# Patient Record
Sex: MALE | Race: WHITE | NOT HISPANIC OR LATINO | Employment: OTHER | URBAN - METROPOLITAN AREA
[De-identification: names, ages, dates, MRNs, and addresses within clinical notes are randomized per-mention and may not be internally consistent; named-entity substitution may affect disease eponyms.]

---

## 2017-05-06 ENCOUNTER — HOSPITAL ENCOUNTER (EMERGENCY)
Facility: HOSPITAL | Age: 47
Discharge: HOME | End: 2017-05-06
Attending: EMERGENCY MEDICINE
Payer: COMMERCIAL

## 2017-05-06 ENCOUNTER — APPOINTMENT (EMERGENCY)
Dept: CT IMAGING | Facility: HOSPITAL | Age: 47
End: 2017-05-06
Payer: COMMERCIAL

## 2017-05-06 VITALS
HEIGHT: 67 IN | OXYGEN SATURATION: 98 % | HEART RATE: 124 BPM | RESPIRATION RATE: 18 BRPM | TEMPERATURE: 98 F | BODY MASS INDEX: 27.68 KG/M2 | SYSTOLIC BLOOD PRESSURE: 133 MMHG | WEIGHT: 176.37 LBS | DIASTOLIC BLOOD PRESSURE: 77 MMHG

## 2017-05-06 DIAGNOSIS — S80.212A ABRASION OF KNEE, LEFT, INITIAL ENCOUNTER: ICD-10-CM

## 2017-05-06 DIAGNOSIS — F10.929 ALCOHOL INTOXICATION (HCC): Primary | ICD-10-CM

## 2017-05-06 DIAGNOSIS — S00.31XA NASAL ABRASION, INITIAL ENCOUNTER: ICD-10-CM
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
## 2017-05-06 LAB — ETHANOL EXG-MCNC: 0.31 MG/DL

## 2017-05-06 PROCEDURE — 90471 IMMUNIZATION ADMIN: CPT

## 2017-05-06 PROCEDURE — 70450 CT HEAD/BRAIN W/O DYE: CPT

## 2017-05-06 PROCEDURE — 70486 CT MAXILLOFACIAL W/O DYE: CPT

## 2017-05-06 PROCEDURE — 72125 CT NECK SPINE W/O DYE: CPT

## 2017-05-06 PROCEDURE — 99284 EMERGENCY DEPT VISIT MOD MDM: CPT

## 2017-05-06 PROCEDURE — 82075 ASSAY OF BREATH ETHANOL: CPT | Performed by: EMERGENCY MEDICINE

## 2017-05-06 PROCEDURE — 90715 TDAP VACCINE 7 YRS/> IM: CPT | Performed by: EMERGENCY MEDICINE

## 2017-05-06 RX ORDER — BACITRACIN, NEOMYCIN, POLYMYXIN B 400; 3.5; 5 [USP'U]/G; MG/G; [USP'U]/G
1 OINTMENT TOPICAL ONCE
Status: COMPLETED | OUTPATIENT
Start: 2017-05-06 | End: 2017-05-06

## 2017-05-06 RX ADMIN — TETANUS TOXOID, REDUCED DIPHTHERIA TOXOID AND ACELLULAR PERTUSSIS VACCINE, ADSORBED 0.5 ML: 5; 2.5; 8; 8; 2.5 SUSPENSION INTRAMUSCULAR at 08:57

## 2017-05-06 RX ADMIN — NEOMYCIN AND POLYMYXIN B SULFATES AND BACITRACIN ZINC 1 SMALL APPLICATION: 400; 3.5; 5 OINTMENT TOPICAL at 08:57

## 2017-07-02 ENCOUNTER — HOSPITAL ENCOUNTER (EMERGENCY)
Facility: HOSPITAL | Age: 47
Discharge: HOME | End: 2017-07-02
Attending: EMERGENCY MEDICINE
Payer: COMMERCIAL

## 2017-07-02 VITALS
TEMPERATURE: 99.4 F | WEIGHT: 170 LBS | DIASTOLIC BLOOD PRESSURE: 91 MMHG | HEART RATE: 110 BPM | OXYGEN SATURATION: 98 % | HEIGHT: 67 IN | BODY MASS INDEX: 26.68 KG/M2 | SYSTOLIC BLOOD PRESSURE: 162 MMHG | RESPIRATION RATE: 16 BRPM

## 2017-07-02 DIAGNOSIS — T21.24XA: Primary | ICD-10-CM

## 2017-07-02 DIAGNOSIS — L03.90 CELLULITIS: ICD-10-CM

## 2017-07-02 PROCEDURE — 99283 EMERGENCY DEPT VISIT LOW MDM: CPT

## 2017-07-02 PROCEDURE — 90715 TDAP VACCINE 7 YRS/> IM: CPT | Performed by: EMERGENCY MEDICINE

## 2017-07-02 PROCEDURE — 90471 IMMUNIZATION ADMIN: CPT

## 2017-07-02 RX ORDER — DIAPER,BRIEF,INFANT-TODD,DISP
1 EACH MISCELLANEOUS 3 TIMES DAILY
Qty: 30 G | Refills: 0 | Status: SHIPPED | OUTPATIENT
Start: 2017-07-02 | End: 2017-07-09

## 2017-07-02 RX ORDER — CEPHALEXIN 250 MG/1
500 CAPSULE ORAL ONCE
Status: COMPLETED | OUTPATIENT
Start: 2017-07-02 | End: 2017-07-02

## 2017-07-02 RX ORDER — CEPHALEXIN 500 MG/1
500 CAPSULE ORAL EVERY 6 HOURS SCHEDULED
Qty: 40 CAPSULE | Refills: 0 | Status: SHIPPED | OUTPATIENT
Start: 2017-07-02 | End: 2017-07-12

## 2017-07-02 RX ORDER — GINSENG 100 MG
1 CAPSULE ORAL ONCE
Status: COMPLETED | OUTPATIENT
Start: 2017-07-02 | End: 2017-07-02

## 2017-07-02 RX ADMIN — TETANUS TOXOID, REDUCED DIPHTHERIA TOXOID AND ACELLULAR PERTUSSIS VACCINE, ADSORBED 0.5 ML: 5; 2.5; 8; 8; 2.5 SUSPENSION INTRAMUSCULAR at 11:18

## 2017-07-02 RX ADMIN — CEPHALEXIN 500 MG: 250 CAPSULE ORAL at 11:32

## 2017-07-02 RX ADMIN — BACITRACIN ZINC 1 SMALL APPLICATION: 500 OINTMENT TOPICAL at 11:20

## 2021-08-30 ENCOUNTER — HOSPITAL ENCOUNTER (EMERGENCY)
Facility: HOSPITAL | Age: 51
Discharge: LEFT BEFORE DISCHARGE | End: 2021-08-30
Attending: EMERGENCY MEDICINE | Admitting: EMERGENCY MEDICINE
Payer: COMMERCIAL

## 2021-08-30 VITALS
HEIGHT: 67 IN | WEIGHT: 165.34 LBS | OXYGEN SATURATION: 96 % | HEART RATE: 112 BPM | BODY MASS INDEX: 25.95 KG/M2 | SYSTOLIC BLOOD PRESSURE: 141 MMHG | DIASTOLIC BLOOD PRESSURE: 88 MMHG | RESPIRATION RATE: 20 BRPM

## 2021-08-30 DIAGNOSIS — F10.10 ALCOHOL ABUSE: Primary | ICD-10-CM

## 2021-08-30 LAB
ALBUMIN SERPL BCP-MCNC: 4 G/DL (ref 3.5–5)
ALP SERPL-CCNC: 74 U/L (ref 46–116)
ALT SERPL W P-5'-P-CCNC: 84 U/L (ref 12–78)
AMPHETAMINES SERPL QL SCN: NEGATIVE
ANION GAP SERPL CALCULATED.3IONS-SCNC: 15 MMOL/L (ref 4–13)
APAP SERPL-MCNC: <2 UG/ML (ref 10–20)
AST SERPL W P-5'-P-CCNC: 69 U/L (ref 5–45)
BARBITURATES UR QL: NEGATIVE
BASOPHILS # BLD AUTO: 0.03 THOUSANDS/ΜL (ref 0–0.1)
BASOPHILS NFR BLD AUTO: 1 % (ref 0–1)
BENZODIAZ UR QL: NEGATIVE
BILIRUB SERPL-MCNC: 0.3 MG/DL (ref 0.2–1)
BUN SERPL-MCNC: 13 MG/DL (ref 5–25)
CALCIUM SERPL-MCNC: 8.6 MG/DL (ref 8.3–10.1)
CHLORIDE SERPL-SCNC: 107 MMOL/L (ref 100–108)
CO2 SERPL-SCNC: 24 MMOL/L (ref 21–32)
COCAINE UR QL: NEGATIVE
CREAT SERPL-MCNC: 0.83 MG/DL (ref 0.6–1.3)
EOSINOPHIL # BLD AUTO: 0.09 THOUSAND/ΜL (ref 0–0.61)
EOSINOPHIL NFR BLD AUTO: 2 % (ref 0–6)
ERYTHROCYTE [DISTWIDTH] IN BLOOD BY AUTOMATED COUNT: 13.3 % (ref 11.6–15.1)
ETHANOL SERPL-MCNC: 358 MG/DL (ref 0–3)
FLUAV RNA RESP QL NAA+PROBE: NEGATIVE
FLUBV RNA RESP QL NAA+PROBE: NEGATIVE
GFR SERPL CREATININE-BSD FRML MDRD: 100 ML/MIN/1.73SQ M
GLUCOSE SERPL-MCNC: 120 MG/DL (ref 65–140)
HCT VFR BLD AUTO: 44.4 % (ref 36.5–49.3)
HGB BLD-MCNC: 15.2 G/DL (ref 12–17)
IMM GRANULOCYTES # BLD AUTO: 0.01 THOUSAND/UL (ref 0–0.2)
IMM GRANULOCYTES NFR BLD AUTO: 0 % (ref 0–2)
INR PPP: 0.89 (ref 0.84–1.19)
LYMPHOCYTES # BLD AUTO: 1.34 THOUSANDS/ΜL (ref 0.6–4.47)
LYMPHOCYTES NFR BLD AUTO: 27 % (ref 14–44)
MAGNESIUM SERPL-MCNC: 1.9 MG/DL (ref 1.6–2.6)
MCH RBC QN AUTO: 32.1 PG (ref 26.8–34.3)
MCHC RBC AUTO-ENTMCNC: 34.2 G/DL (ref 31.4–37.4)
MCV RBC AUTO: 94 FL (ref 82–98)
METHADONE UR QL: NEGATIVE
MONOCYTES # BLD AUTO: 0.48 THOUSAND/ΜL (ref 0.17–1.22)
MONOCYTES NFR BLD AUTO: 10 % (ref 4–12)
NEUTROPHILS # BLD AUTO: 3 THOUSANDS/ΜL (ref 1.85–7.62)
NEUTS SEG NFR BLD AUTO: 60 % (ref 43–75)
NRBC BLD AUTO-RTO: 0 /100 WBCS
OPIATES UR QL SCN: NEGATIVE
OXYCODONE+OXYMORPHONE UR QL SCN: NEGATIVE
PCP UR QL: NEGATIVE
PLATELET # BLD AUTO: 193 THOUSANDS/UL (ref 149–390)
PMV BLD AUTO: 10.1 FL (ref 8.9–12.7)
POTASSIUM SERPL-SCNC: 4.1 MMOL/L (ref 3.5–5.3)
PROT SERPL-MCNC: 7.3 G/DL (ref 6.4–8.2)
PROTHROMBIN TIME: 11.6 SECONDS (ref 11.6–14.5)
RBC # BLD AUTO: 4.73 MILLION/UL (ref 3.88–5.62)
RSV RNA RESP QL NAA+PROBE: NEGATIVE
SALICYLATES SERPL-MCNC: <3 MG/DL (ref 3–20)
SARS-COV-2 RNA RESP QL NAA+PROBE: NEGATIVE
SODIUM SERPL-SCNC: 146 MMOL/L (ref 136–145)
THC UR QL: NEGATIVE
TROPONIN I SERPL-MCNC: <0.02 NG/ML
TSH SERPL DL<=0.05 MIU/L-ACNC: 0.63 UIU/ML (ref 0.36–3.74)
WBC # BLD AUTO: 4.95 THOUSAND/UL (ref 4.31–10.16)

## 2021-08-30 PROCEDURE — 0241U HB NFCT DS VIR RESP RNA 4 TRGT: CPT | Performed by: EMERGENCY MEDICINE

## 2021-08-30 PROCEDURE — 84443 ASSAY THYROID STIM HORMONE: CPT | Performed by: EMERGENCY MEDICINE

## 2021-08-30 PROCEDURE — 83735 ASSAY OF MAGNESIUM: CPT | Performed by: EMERGENCY MEDICINE

## 2021-08-30 PROCEDURE — 36415 COLL VENOUS BLD VENIPUNCTURE: CPT | Performed by: EMERGENCY MEDICINE

## 2021-08-30 PROCEDURE — 80143 DRUG ASSAY ACETAMINOPHEN: CPT | Performed by: EMERGENCY MEDICINE

## 2021-08-30 PROCEDURE — 99285 EMERGENCY DEPT VISIT HI MDM: CPT

## 2021-08-30 PROCEDURE — 80053 COMPREHEN METABOLIC PANEL: CPT | Performed by: EMERGENCY MEDICINE

## 2021-08-30 PROCEDURE — 84484 ASSAY OF TROPONIN QUANT: CPT | Performed by: EMERGENCY MEDICINE

## 2021-08-30 PROCEDURE — 80307 DRUG TEST PRSMV CHEM ANLYZR: CPT | Performed by: EMERGENCY MEDICINE

## 2021-08-30 PROCEDURE — 85610 PROTHROMBIN TIME: CPT | Performed by: EMERGENCY MEDICINE

## 2021-08-30 PROCEDURE — 82077 ASSAY SPEC XCP UR&BREATH IA: CPT | Performed by: EMERGENCY MEDICINE

## 2021-08-30 PROCEDURE — 96374 THER/PROPH/DIAG INJ IV PUSH: CPT

## 2021-08-30 PROCEDURE — 85025 COMPLETE CBC W/AUTO DIFF WBC: CPT | Performed by: EMERGENCY MEDICINE

## 2021-08-30 PROCEDURE — 80179 DRUG ASSAY SALICYLATE: CPT | Performed by: EMERGENCY MEDICINE

## 2021-08-30 PROCEDURE — 99284 EMERGENCY DEPT VISIT MOD MDM: CPT | Performed by: EMERGENCY MEDICINE

## 2021-08-30 RX ORDER — DIAZEPAM 5 MG/ML
5 INJECTION, SOLUTION INTRAMUSCULAR; INTRAVENOUS ONCE
Status: COMPLETED | OUTPATIENT
Start: 2021-08-30 | End: 2021-08-30

## 2021-08-30 RX ORDER — DIAZEPAM 5 MG/ML
INJECTION, SOLUTION INTRAMUSCULAR; INTRAVENOUS
Status: COMPLETED
Start: 2021-08-30 | End: 2021-08-30

## 2021-08-30 RX ORDER — DIAZEPAM 5 MG/ML
2.5 INJECTION, SOLUTION INTRAMUSCULAR; INTRAVENOUS ONCE
Status: DISCONTINUED | OUTPATIENT
Start: 2021-08-30 | End: 2021-08-30

## 2021-08-30 RX ORDER — OLANZAPINE 5 MG/1
10 TABLET, ORALLY DISINTEGRATING ORAL ONCE
Status: DISCONTINUED | OUTPATIENT
Start: 2021-08-30 | End: 2021-08-30 | Stop reason: HOSPADM

## 2021-08-30 RX ADMIN — DIAZEPAM 5 MG: 5 INJECTION, SOLUTION INTRAMUSCULAR; INTRAVENOUS at 13:40

## 2021-08-30 NOTE — ED NOTES
Pt eloped ED at approx 1400 hours  Pt seen by [facility] crew walking towards 611  This RN and Security checked ED parking lot and main ED road, unable to find pt  This RN called [place] [employer] to notify of elopement  Pts name,  and description provided to dispatcher  Dr Heather Mensah notified       Tim Jimenez RN  [phone]

## 2021-08-30 NOTE — ED PROVIDER NOTES
History  Chief Complaint   Patient presents with    Psychiatric Evaluation     pt reported to mother that he was going to kill himself today  pt ran off into woods and was chased by police and EMS  Pt uncooperative and refusing to answer  Per mother, pt has been repeatedly stating "I cant live anymore, I want to die, I am going to heaven today " Mom reports she wants to petition 302  Mom reports SI history   Alcohol Intoxication     Pt "pleads the 5th " Will not report how much he drinks  Mom reports he is on a "Babin" and has been drinking hard liquor x5 days  "     46year old male patient presents emergency department for evaluation of intoxication  The patient was brought in by EMS after being apprehended by the police  Patient's mother was in a petition a 36 stating that she is concerned for the patient's physical well-being  The patient claims to be a 30 your alcoholic and states he currently drinks approximately 30 beers a day plus or minus one or more bottles of liquor  He has had 10 months of sobriety most recently which ended approximately one month ago he states he has been drinking daily for the last 30 days  Currently the patient is very obviously under the influence of some sort of an intoxicating substance has initially very apprehensive about speaking with me  Explained him that we can offer him medications to help him through the initial withdrawal   And that we do have an inpatient facility available at [facility] that may be able to help him detox fine from alcohol  After some discussion he is willing to entertain this  When speaking with the patient he states that he is not currently suicidal, he states that he would be fine if he drink himself to death but he has no plans with which to do so    The patient is also currently very obviously intoxicated and I explained that I would need to be able to replicate any suicidal behaviors or psychosis when the patient was clinically sober which she is currently not  My hope is that the patient will voluntarily be transferred to our [facility] for inpatient alcohol detox  History provided by:  Patient   used: No    Psychiatric Evaluation  Presenting symptoms: agitation, bizarre behavior and depression    Patient accompanied by:  Family member and parent  Onset quality:  Gradual  Timing:  Constant  Progression:  Worsening  Chronicity:  Recurrent  Context: not alcohol use and not recent medication change    Relieved by:  Nothing  Worsened by:  Nothing  Associated symptoms: no anhedonia, no decreased need for sleep, no hyperventilation, no poor judgment and no weight change    Risk factors: no family violence and no neurological disease    Alcohol Intoxication  Associated symptoms: agitation        Prior to Admission Medications   Prescriptions Last Dose Informant Patient Reported? Taking?   bacitracin ointment   No No   Sig: Apply 1 g topically 3 (three) times a day for 7 days      Facility-Administered Medications: None       Past Medical History:   Diagnosis Date    ETOH abuse        Past Surgical History:   Procedure Laterality Date    ADENOIDECTOMY      TONSILLECTOMY         History reviewed  No pertinent family history  I have reviewed and agree with the history as documented  E-Cigarette/Vaping    E-Cigarette Use Never User      E-Cigarette/Vaping Substances     Social History     Tobacco Use    Smoking status: Never Smoker    Smokeless tobacco: Never Used   Vaping Use    Vaping Use: Never used   Substance Use Topics    Alcohol use: Yes     Alcohol/week: 3 0 standard drinks     Types: 3 Standard drinks or equivalent per week    Drug use: No       Review of Systems   Psychiatric/Behavioral: Positive for agitation  All other systems reviewed and are negative  Physical Exam  Physical Exam  Vitals and nursing note reviewed  Constitutional:       General: He is not in acute distress  Appearance: He is well-developed  He is not diaphoretic  HENT:      Head: Normocephalic and atraumatic  Right Ear: External ear normal       Left Ear: External ear normal    Eyes:      General: No scleral icterus  Right eye: No discharge  Left eye: No discharge  Conjunctiva/sclera: Conjunctivae normal    Neck:      Thyroid: No thyromegaly  Vascular: No JVD  Trachea: No tracheal deviation  Cardiovascular:      Rate and Rhythm: Normal rate and regular rhythm  Pulmonary:      Effort: Pulmonary effort is normal  No respiratory distress  Breath sounds: Normal breath sounds  No stridor  No wheezing or rales  Abdominal:      General: Bowel sounds are normal  There is no distension  Palpations: Abdomen is soft  Tenderness: There is no abdominal tenderness  Musculoskeletal:         General: No tenderness or deformity  Normal range of motion  Cervical back: Normal range of motion and neck supple  Skin:     General: Skin is warm and dry  Neurological:      Mental Status: He is alert and oriented to person, place, and time  Cranial Nerves: No cranial nerve deficit  Coordination: Coordination normal    Psychiatric:         Behavior: Behavior normal          Vital Signs  ED Triage Vitals [08/30/21 1309]   Temp Pulse Respirations Blood Pressure SpO2   -- (!) 112 20 141/88 96 %      Temp src Heart Rate Source Patient Position - Orthostatic VS BP Location FiO2 (%)   -- -- -- -- --      Pain Score       No Pain           Vitals:    08/30/21 1309   BP: 141/88   Pulse: (!) 112         Visual Acuity      ED Medications  Medications   diazepam (VALIUM) injection 5 mg (5 mg Intravenous Given 8/30/21 1340)       Diagnostic Studies  Results Reviewed     Procedure Component Value Units Date/Time    Acetaminophen level-If concentration is detectable, please discuss with medical  on call   [[id number]]  (Abnormal) Collected: 08/30/21 1336    Lab Status: Final result Specimen: Blood from Arm, Right Updated: 08/30/21 1450     Acetaminophen Level <6 6 ug/mL     Salicylate level [[id number]]  (Abnormal) Collected: 08/30/21 1336    Lab Status: Final result Specimen: Blood from Arm, Right Updated: 96/46/75 7058     Salicylate Lvl <3 mg/dL     COVID19, Influenza A/B, RSV PCR, [facility] [[id number]]  (Normal) Collected: 08/30/21 1336    Lab Status: Final result Specimen: Nares from Nasopharyngeal Swab Updated: 08/30/21 1424     SARS-CoV-2 Negative     INFLUENZA A PCR Negative     INFLUENZA B PCR Negative     RSV PCR Negative    Narrative: This test has been authorized by FDA under an EUA (Emergency Use Assay) for use by authorized laboratories  Clinical caution and judgement should be used with the interpretation of these results with consideration of the clinical impression and other laboratory testing  Testing reported as "Positive" or "Negative" has been proven to be accurate according to standard laboratory validation requirements  All testing is performed with control materials showing appropriate reactivity at standard intervals  TSH, 3rd generation with Free T4 reflex [[id number]]  (Normal) Collected: 08/30/21 1336    Lab Status: Final result Specimen: Blood from Arm, Right Updated: 08/30/21 1418     TSH 3RD GENERATON 0 633 uIU/mL     Narrative:      Patients undergoing fluorescein dye angiography may retain small amounts of fluorescein in the body for 48-72 hours post procedure  Samples containing fluorescein can produce falsely depressed TSH values  If the patient had this procedure,a specimen should be resubmitted post fluorescein clearance        Comprehensive metabolic panel [[id number]]  (Abnormal) Collected: 08/30/21 1336    Lab Status: Final result Specimen: Blood from Arm, Right Updated: 08/30/21 1418     Sodium 146 mmol/L      Potassium 4 1 mmol/L      Chloride 107 mmol/L      CO2 24 mmol/L      ANION GAP 15 mmol/L      BUN 13 mg/dL      Creatinine 0 83 mg/dL      Glucose 120 mg/dL      Calcium 8 6 mg/dL      AST 69 U/L      ALT 84 U/L      Alkaline Phosphatase 74 U/L      Total Protein 7 3 g/dL      Albumin 4 0 g/dL      Total Bilirubin 0 30 mg/dL      eGFR 100 ml/min/1 73sq m     Narrative:      [place] guidelines for Chronic Kidney Disease (CKD):     Stage 1 with normal or high GFR (GFR > 90 mL/min/1 73 square meters)    Stage 2 Mild CKD (GFR = 60-89 mL/min/1 73 square meters)    Stage 3A Moderate CKD (GFR = 45-59 mL/min/1 73 square meters)    Stage 3B Moderate CKD (GFR = 30-44 mL/min/1 73 square meters)    Stage 4 Severe CKD (GFR = 15-29 mL/min/1 73 square meters)    Stage 5 End Stage CKD (GFR <15 mL/min/1 73 square meters)  Note: GFR calculation is accurate only with a steady state creatinine    Troponin I [[id number]]  (Normal) Collected: 08/30/21 1336    Lab Status: Final result Specimen: Blood from Arm, Right Updated: 08/30/21 1403     Troponin I <0 02 ng/mL     Ethanol [[id number]]  (Abnormal) Collected: 08/30/21 1336    Lab Status: Final result Specimen: Blood from Arm, Right Updated: 08/30/21 1402     Ethanol Lvl 358 mg/dL     Magnesium [[id number]]  (Normal) Collected: 08/30/21 1336    Lab Status: Final result Specimen: Blood from Arm, Right Updated: 08/30/21 1400     Magnesium 1 9 mg/dL     Protime-INR [[id number]]  (Normal) Collected: 08/30/21 1336    Lab Status: Final result Specimen: Blood from Arm, Right Updated: 08/30/21 1355     Protime 11 6 seconds      INR 0 89    Rapid drug screen, urine [[id number]]  (Normal) Collected: 08/30/21 1337    Lab Status: Final result Specimen: Urine, Clean Catch Updated: 08/30/21 1354     Amph/Meth UR Negative     Barbiturate Ur Negative     Benzodiazepine Urine Negative     Cocaine Urine Negative     Methadone Urine Negative     Opiate Urine Negative     PCP Ur Negative     THC Urine Negative     Oxycodone Urine Negative    Narrative:      FOR MEDICAL PURPOSES ONLY     IF CONFIRMATION NEEDED PLEASE CONTACT THE LAB WITHIN 5 DAYS  Drug Screen Cutoff Levels:  AMPHETAMINE/METHAMPHETAMINES  1000 ng/mL  BARBITURATES     200 ng/mL  BENZODIAZEPINES     200 ng/mL  COCAINE      300 ng/mL  METHADONE      300 ng/mL  OPIATES      300 ng/mL  PHENCYCLIDINE     25 ng/mL  THC       50 ng/mL  OXYCODONE      100 ng/mL    CBC and differential [[id number]] Collected: 08/30/21 1336    Lab Status: Final result Specimen: Blood from Arm, Right Updated: 08/30/21 1345     WBC 4 95 Thousand/uL      RBC 4 73 Million/uL      Hemoglobin 15 2 g/dL      Hematocrit 44 4 %      MCV 94 fL      MCH 32 1 pg      MCHC 34 2 g/dL      RDW 13 3 %      MPV 10 1 fL      Platelets 823 Thousands/uL      nRBC 0 /100 WBCs      Neutrophils Relative 60 %      Immat GRANS % 0 %      Lymphocytes Relative 27 %      Monocytes Relative 10 %      Eosinophils Relative 2 %      Basophils Relative 1 %      Neutrophils Absolute 3 00 Thousands/µL      Immature Grans Absolute 0 01 Thousand/uL      Lymphocytes Absolute 1 34 Thousands/µL      Monocytes Absolute 0 48 Thousand/µL      Eosinophils Absolute 0 09 Thousand/µL      Basophils Absolute 0 03 Thousands/µL                  No orders to display              Procedures  Procedures         ED Course  ED Course as of Aug 31 1530   Mon Aug 30, 2021   1358 The patient apparently absconded from the ER  Will notify the police         9710 Because the patient was obviously intoxicated and made comments threatening suicide with a plan his mother is petitioning county crisis for a 302  When the patient is returned to the ED he will be placed in four point locked restraints and sedated until he can participate in his evaluation                                                MDM  Number of Diagnoses or Management Options  Alcohol abuse: new and requires workup     Amount and/or Complexity of Data Reviewed  Clinical lab tests: ordered and reviewed  Tests in the radiology section of CPT®: ordered and reviewed  Decide to obtain previous medical records or to obtain history from someone other than the patient: yes  Review and summarize past medical records: yes    Patient Progress  Patient progress: stable      Disposition  Final diagnoses:   Alcohol abuse     Time reflects when diagnosis was documented in both MDM as applicable and the Disposition within this note     Time User Action Codes Description Comment    8/30/2021  2:10 PM Berniecynthia Araya Add [F10 10] Alcohol abuse       ED Disposition     ED Disposition Condition Date/Time Comment    Eloped  Mon Aug 30, 2021  2:11 PM       Follow-up Information    None         Discharge Medication List as of 8/30/2021  2:28 PM      CONTINUE these medications which have NOT CHANGED    Details   bacitracin ointment Apply 1 g topically 3 (three) times a day for 7 days, Starting Sun 7/2/2017, Until Sun 7/9/2017, Print           No discharge procedures on file      PDMP Review     None          ED Provider  Electronically Signed by           Madison Ingram DO  08/31/21 5115

## 2021-08-30 NOTE — ED NOTES
Call placed to patient's emergency contact,Curt (son), although the number is no longer active  Per ED physician, the patients mother requested to petition a 36, although we currently do not have a phone number for her  At this time, ED physician will be petitioned 302      Thuy Stuart LMSW  08/30/21  9439

## 2021-08-30 NOTE — ED NOTES
Pt states that "any question you ask me I will say no to "     Joselin Edward, DIONNE  08/30/21 0767

## 2021-08-30 NOTE — ED NOTES
Pt laying in bed naked, ripped IV out stating "I dont like it, I dont fucking want it " Pt also removed himself from the monitor, refusing RN to place back on     Saul Kevin RN  08/30/21 3410 [facility], [address] [place] [address]  08/30/21 4863

## 2021-08-30 NOTE — ED NOTES
RN repeatedly asked patient if he is suicidal  Pt reports "he cant be straight with me " Will not admit nor deny SI  "I cant say yes or not " Pt did agree to "not run "    Mother reports patient did not state he was "going to kill himself" but rather stated "he cant live this way "     Joselin Edward, DIONNE  08/30/21 9621

## 2021-09-04 ENCOUNTER — HOSPITAL ENCOUNTER (EMERGENCY)
Facility: HOSPITAL | Age: 51
Discharge: HOME | End: 2021-09-04
Attending: EMERGENCY MEDICINE | Admitting: EMERGENCY MEDICINE
Payer: COMMERCIAL

## 2021-09-04 VITALS
DIASTOLIC BLOOD PRESSURE: 76 MMHG | RESPIRATION RATE: 18 BRPM | TEMPERATURE: 97.9 F | SYSTOLIC BLOOD PRESSURE: 136 MMHG | HEART RATE: 97 BPM | BODY MASS INDEX: 25.9 KG/M2 | WEIGHT: 165.34 LBS | OXYGEN SATURATION: 98 %

## 2021-09-04 DIAGNOSIS — F10.10 ALCOHOL ABUSE: ICD-10-CM

## 2021-09-04 DIAGNOSIS — F10.920 ALCOHOLIC INTOXICATION WITHOUT COMPLICATION (HCC): Primary | ICD-10-CM

## 2021-09-04 LAB
ALBUMIN SERPL BCP-MCNC: 3.9 G/DL (ref 3.5–5)
ALP SERPL-CCNC: 60 U/L (ref 46–116)
ALT SERPL W P-5'-P-CCNC: 81 U/L (ref 12–78)
ANION GAP SERPL CALCULATED.3IONS-SCNC: 10 MMOL/L (ref 4–13)
AST SERPL W P-5'-P-CCNC: 55 U/L (ref 5–45)
BASOPHILS # BLD AUTO: 0.04 THOUSANDS/ΜL (ref 0–0.1)
BASOPHILS NFR BLD AUTO: 1 % (ref 0–1)
BILIRUB SERPL-MCNC: 0.33 MG/DL (ref 0.2–1)
BUN SERPL-MCNC: 9 MG/DL (ref 5–25)
CALCIUM SERPL-MCNC: 8.2 MG/DL (ref 8.3–10.1)
CHLORIDE SERPL-SCNC: 108 MMOL/L (ref 100–108)
CO2 SERPL-SCNC: 27 MMOL/L (ref 21–32)
CREAT SERPL-MCNC: 0.73 MG/DL (ref 0.6–1.3)
EOSINOPHIL # BLD AUTO: 0.07 THOUSAND/ΜL (ref 0–0.61)
EOSINOPHIL NFR BLD AUTO: 1 % (ref 0–6)
ERYTHROCYTE [DISTWIDTH] IN BLOOD BY AUTOMATED COUNT: 13.5 % (ref 11.6–15.1)
ETHANOL SERPL-MCNC: 363 MG/DL (ref 0–3)
GFR SERPL CREATININE-BSD FRML MDRD: 106 ML/MIN/1.73SQ M
GLUCOSE SERPL-MCNC: 107 MG/DL (ref 65–140)
HCT VFR BLD AUTO: 44.8 % (ref 36.5–49.3)
HGB BLD-MCNC: 15.1 G/DL (ref 12–17)
IMM GRANULOCYTES # BLD AUTO: 0.03 THOUSAND/UL (ref 0–0.2)
IMM GRANULOCYTES NFR BLD AUTO: 1 % (ref 0–2)
LYMPHOCYTES # BLD AUTO: 1.18 THOUSANDS/ΜL (ref 0.6–4.47)
LYMPHOCYTES NFR BLD AUTO: 24 % (ref 14–44)
MAGNESIUM SERPL-MCNC: 2.2 MG/DL (ref 1.6–2.6)
MCH RBC QN AUTO: 32.1 PG (ref 26.8–34.3)
MCHC RBC AUTO-ENTMCNC: 33.7 G/DL (ref 31.4–37.4)
MCV RBC AUTO: 95 FL (ref 82–98)
MONOCYTES # BLD AUTO: 0.56 THOUSAND/ΜL (ref 0.17–1.22)
MONOCYTES NFR BLD AUTO: 11 % (ref 4–12)
NEUTROPHILS # BLD AUTO: 3.13 THOUSANDS/ΜL (ref 1.85–7.62)
NEUTS SEG NFR BLD AUTO: 62 % (ref 43–75)
NRBC BLD AUTO-RTO: 0 /100 WBCS
PMV BLD AUTO: 10 FL (ref 8.9–12.7)
POTASSIUM SERPL-SCNC: 3.6 MMOL/L (ref 3.5–5.3)
PROT SERPL-MCNC: 7.3 G/DL (ref 6.4–8.2)
RBC # BLD AUTO: 4.7 MILLION/UL (ref 3.88–5.62)
SODIUM SERPL-SCNC: 145 MMOL/L (ref 136–145)
WBC # BLD AUTO: 5.01 THOUSAND/UL (ref 4.31–10.16)

## 2021-09-04 PROCEDURE — 36415 COLL VENOUS BLD VENIPUNCTURE: CPT | Performed by: EMERGENCY MEDICINE

## 2021-09-04 PROCEDURE — 83735 ASSAY OF MAGNESIUM: CPT | Performed by: EMERGENCY MEDICINE

## 2021-09-04 PROCEDURE — 85025 COMPLETE CBC W/AUTO DIFF WBC: CPT | Performed by: EMERGENCY MEDICINE

## 2021-09-04 PROCEDURE — 80053 COMPREHEN METABOLIC PANEL: CPT | Performed by: EMERGENCY MEDICINE

## 2021-09-04 PROCEDURE — 82077 ASSAY SPEC XCP UR&BREATH IA: CPT | Performed by: EMERGENCY MEDICINE

## 2021-09-04 PROCEDURE — 99282 EMERGENCY DEPT VISIT SF MDM: CPT | Performed by: EMERGENCY MEDICINE

## 2021-09-04 PROCEDURE — 99284 EMERGENCY DEPT VISIT MOD MDM: CPT

## 2021-09-04 NOTE — ED PROVIDER NOTES
Pt Name: Nicole Pierre  MRN: [id number]  [place] 1970  Age/Sex: 46 y o  male  Date of evaluation: 9/4/2021  PCP: Cale Quinones MD    [address] [place] [address]    Chief Complaint   Patient presents with    Alcohol Intoxication     Pt reports being drunk and looking for help  Pt has been drinking daily, and is currently intoxicated  HPI    46 y o  male presenting with alcohol intoxication  Family members present at bedside states that patient was at a sober picnic, however patient was intoxicated with alcohol  So they had to leave, on the way home patient stated he wanted to go to the emergency department  Patient is interested in detox  States last time he had alcohol rehabilitation was year ago, he was sober for 10 months until 3 months ago  He states he is constantly drinking alcohol, last beverage was in the parking lot here  Denies any illicit drug use  No chest pain or shortness of breath or abdominal pain or nausea or vomiting  Patient does chew tobacco   No SI or HI  Past Medical and Surgical History    Past Medical History:   Diagnosis Date    ETOH abuse        Past Surgical History:   Procedure Laterality Date    ADENOIDECTOMY      TONSILLECTOMY         History reviewed  No pertinent family history  Social History     Tobacco Use    Smoking status: Never Smoker    Smokeless tobacco: Never Used   Vaping Use    Vaping Use: Never used   Substance Use Topics    Alcohol use: Yes     Alcohol/week: 3 0 standard drinks     Types: 3 Standard drinks or equivalent per week    Drug use: No           Allergies    No Known Allergies    Home Medications    Prior to Admission medications    Medication Sig Start Date End Date Taking? Authorizing Provider   bacitracin ointment Apply 1 g topically 3 (three) times a day for 7 days 7/2/17 7/9/17  Ghassan Chen MD           Review of Systems    Review of Systems   Constitutional: Negative for chills and fever     HENT: Negative for rhinorrhea and sore throat  Eyes: Negative for pain and visual disturbance  Respiratory: Negative for cough and shortness of breath  Cardiovascular: Negative for chest pain and leg swelling  Gastrointestinal: Negative for abdominal pain, nausea and vomiting  Genitourinary: Negative for dysuria and hematuria  Musculoskeletal: Negative for back pain and myalgias  Skin: Negative for rash and wound  Neurological: Negative for syncope and headaches  Psychiatric/Behavioral: Negative for hallucinations and suicidal ideas  Physical Exam      ED Triage Vitals   Temperature Pulse Respirations Blood Pressure SpO2   09/04/21 1739 09/04/21 1739 09/04/21 1739 09/04/21 1739 09/04/21 1739   97 9 °F (36 6 °C) (!) 113 18 133/100 97 %      Temp src Heart Rate Source Patient Position - Orthostatic VS BP Location FiO2 (%)   -- 09/04/21 1739 09/04/21 2015 09/04/21 1739 --    Monitor Lying Right arm       Pain Score       --                      Physical Exam  Constitutional:       General: He is not in acute distress  Comments: Patient intoxicated   HENT:      Head: Normocephalic and atraumatic  Nose: Nose normal       Mouth/Throat:      Mouth: Mucous membranes are moist       Comments: No tongue fasciculations  Eyes:      Extraocular Movements: Extraocular movements intact  Pupils: Pupils are equal, round, and reactive to light  Cardiovascular:      Rate and Rhythm: Normal rate and regular rhythm  Pulmonary:      Effort: No respiratory distress  Breath sounds: Normal breath sounds  No wheezing  Abdominal:      General: There is no distension  Palpations: Abdomen is soft  Tenderness: There is no abdominal tenderness  Musculoskeletal:         General: No swelling or deformity  Normal range of motion  Cervical back: Normal range of motion and neck supple  Skin:     General: Skin is warm  Findings: No erythema     Neurological:      Mental Status: He is alert and oriented to person, place, and time  Mental status is at baseline                Diagnostic Results      Labs:    Results Reviewed     Procedure Component Value Units Date/Time    Ethanol [[id number]]  (Abnormal) Collected: 09/04/21 1930    Lab Status: Final result Specimen: Blood from Arm, Right Updated: 09/04/21 1959     Ethanol Lvl 363 mg/dL     CBC and differential [[id number]] Collected: 09/04/21 1930    Lab Status: Final result Specimen: Blood from Arm, Right Updated: 09/04/21 1957     WBC 5 01 Thousand/uL      RBC 4 70 Million/uL      Hemoglobin 15 1 g/dL      Hematocrit 44 8 %      MCV 95 fL      MCH 32 1 pg      MCHC 33 7 g/dL      RDW 13 5 %      MPV 10 0 fL      Platelets --     nRBC 0 /100 WBCs      Neutrophils Relative 62 %      Immat GRANS % 1 %      Lymphocytes Relative 24 %      Monocytes Relative 11 %      Eosinophils Relative 1 %      Basophils Relative 1 %      Neutrophils Absolute 3 13 Thousands/µL      Immature Grans Absolute 0 03 Thousand/uL      Lymphocytes Absolute 1 18 Thousands/µL      Monocytes Absolute 0 56 Thousand/µL      Eosinophils Absolute 0 07 Thousand/µL      Basophils Absolute 0 04 Thousands/µL     Comprehensive metabolic panel [[id number]]  (Abnormal) Collected: 09/04/21 1930    Lab Status: Final result Specimen: Blood from Arm, Right Updated: 09/04/21 1951     Sodium 145 mmol/L      Potassium 3 6 mmol/L      Chloride 108 mmol/L      CO2 27 mmol/L      ANION GAP 10 mmol/L      BUN 9 mg/dL      Creatinine 0 73 mg/dL      Glucose 107 mg/dL      Calcium 8 2 mg/dL      AST 55 U/L      ALT 81 U/L      Alkaline Phosphatase 60 U/L      Total Protein 7 3 g/dL      Albumin 3 9 g/dL      Total Bilirubin 0 33 mg/dL      eGFR 106 ml/min/1 73sq m     Narrative:      [place] guidelines for Chronic Kidney Disease (CKD):     Stage 1 with normal or high GFR (GFR > 90 mL/min/1 73 square meters)    Stage 2 Mild CKD (GFR = 60-89 mL/min/1 73 square meters)    Stage 3A Moderate CKD (GFR = 45-59 mL/min/1 73 square meters)    Stage 3B Moderate CKD (GFR = 30-44 mL/min/1 73 square meters)    Stage 4 Severe CKD (GFR = 15-29 mL/min/1 73 square meters)    Stage 5 End Stage CKD (GFR <15 mL/min/1 73 square meters)  Note: GFR calculation is accurate only with a steady state creatinine    Magnesium [[id number]]  (Normal) Collected: 09/04/21 1930    Lab Status: Final result Specimen: Blood from Arm, Right Updated: 09/04/21 1951     Magnesium 2 2 mg/dL           All labs reviewed and utilized in the medical decision making process    Radiology:    No orders to display       All radiology studies independently viewed by me and interpreted by the radiologist     Procedure    Procedures        MDM    Currently patient is willing to go to detox/rehab  Does not have any complaints  Will obtain baseline blood work including alcohol level  No SI or HI  Not undergoing any withdrawal symptoms  No history of seizures  Blood work reveals elevated alcohol level  Transaminitis which is similar to prior  Patient now does not want to stay in the hospital, does not want detox or rehab  He wants to leave, family member is willing to take patient home  He remains asymptomatic  He is alert oriented x3  Provided substance abuse rehabilitation resources  Advised PCP follow-up  Return precautions discussed            Medications - No data to display        FINAL IMPRESSION    Final diagnoses:   Alcoholic intoxication without complication ([facility] [address] )   Alcohol abuse         DISPOSITION    Time reflects when diagnosis was documented in both MDM as applicable and the Disposition within this note     Time User Action Codes Description Comment    9/4/2021  8:23 PM Lyndol Ortiz Add [C04 253] Alcoholic intoxication without complication ([facility] [address] )     1/4/6011  8:23 PM Lyndol Ortiz Add [F10 10] Alcohol abuse       ED Disposition     ED Disposition Condition Date/Time Comment    Discharge Stable Sat Sep 4, 2021  8:22 PM Parthaa Natalia discharge to home/self care  Follow-up Information     Follow up With Specialties Details Why Contact Info Additional Information    Raymond Schmidt MD  Schedule an appointment as soon as possible for a visit in 3 days  [address] [facility] [address]  Emergency Department Emergency Medicine Go to  As needed 34 [facility] 109 [facility] Emergency Department, [address], [place], [address] TO:    Raymond Schmidt MD  [address] [place] [address]  [phone]    Schedule an appointment as soon as possible for a visit in 3 days      [address] [facility] Emergency Department  [address] [facility] [id number] [phone]  Go to   As needed      DISCHARGE MEDICATIONS:    Discharge Medication List as of 9/4/2021  8:23 PM      CONTINUE these medications which have NOT CHANGED    Details   bacitracin ointment Apply 1 g topically 3 (three) times a day for 7 days, Starting Sun 7/2/2017, Until Sun 7/9/2017, Print             No discharge procedures on file  Ocie Files, DO        This note was partially completed using voice recognition technology, and was scanned for gross errors; however some errors may still exist  Please contact the author with any questions or requests for clarification        Ocie Files, DO  09/05/21 5459

## 2024-04-24 ENCOUNTER — HOSPITAL ENCOUNTER (EMERGENCY)
Facility: HOSPITAL | Age: 54
End: 2024-04-24
Attending: EMERGENCY MEDICINE
Payer: COMMERCIAL

## 2024-04-24 ENCOUNTER — HOSPITAL ENCOUNTER (INPATIENT)
Facility: HOSPITAL | Age: 54
LOS: 2 days | Discharge: HOME | End: 2024-04-26
Attending: EMERGENCY MEDICINE | Admitting: EMERGENCY MEDICINE
Payer: COMMERCIAL

## 2024-04-24 VITALS
DIASTOLIC BLOOD PRESSURE: 67 MMHG | WEIGHT: 185.41 LBS | BODY MASS INDEX: 29.04 KG/M2 | HEART RATE: 76 BPM | RESPIRATION RATE: 16 BRPM | SYSTOLIC BLOOD PRESSURE: 122 MMHG | OXYGEN SATURATION: 98 % | TEMPERATURE: 97.9 F

## 2024-04-24 DIAGNOSIS — F32.A ANXIETY AND DEPRESSION: Primary | ICD-10-CM

## 2024-04-24 DIAGNOSIS — F10.939 ALCOHOL WITHDRAWAL (HCC): Primary | ICD-10-CM

## 2024-04-24 DIAGNOSIS — F41.9 ANXIETY AND DEPRESSION: Primary | ICD-10-CM

## 2024-04-24 DIAGNOSIS — F10.20 ALCOHOL USE DISORDER, SEVERE, DEPENDENCE (HCC): ICD-10-CM

## 2024-04-24 PROBLEM — I10 HTN (HYPERTENSION): Status: ACTIVE | Noted: 2024-04-24

## 2024-04-24 LAB
2HR DELTA HS TROPONIN: 0 NG/L
ALBUMIN SERPL BCP-MCNC: 4.2 G/DL (ref 3.5–5)
ALP SERPL-CCNC: 63 U/L (ref 34–104)
ALT SERPL W P-5'-P-CCNC: 31 U/L (ref 7–52)
ANION GAP SERPL CALCULATED.3IONS-SCNC: 9 MMOL/L (ref 4–13)
APAP SERPL-MCNC: <2 UG/ML (ref 10–20)
AST SERPL W P-5'-P-CCNC: 36 U/L (ref 13–39)
ATRIAL RATE: 97 BPM
BASOPHILS # BLD AUTO: 0.07 THOUSANDS/ÂΜL (ref 0–0.1)
BASOPHILS NFR BLD AUTO: 1 % (ref 0–1)
BILIRUB SERPL-MCNC: 0.27 MG/DL (ref 0.2–1)
BUN SERPL-MCNC: 9 MG/DL (ref 5–25)
CALCIUM SERPL-MCNC: 8.8 MG/DL (ref 8.4–10.2)
CARDIAC TROPONIN I PNL SERPL HS: 5 NG/L
CARDIAC TROPONIN I PNL SERPL HS: 5 NG/L
CHLORIDE SERPL-SCNC: 107 MMOL/L (ref 96–108)
CO2 SERPL-SCNC: 28 MMOL/L (ref 21–32)
CREAT SERPL-MCNC: 1.02 MG/DL (ref 0.6–1.3)
EOSINOPHIL # BLD AUTO: 0.03 THOUSAND/ÂΜL (ref 0–0.61)
EOSINOPHIL NFR BLD AUTO: 1 % (ref 0–6)
ERYTHROCYTE [DISTWIDTH] IN BLOOD BY AUTOMATED COUNT: 14.4 % (ref 11.6–15.1)
ETHANOL SERPL-MCNC: 300 MG/DL
GFR SERPL CREATININE-BSD FRML MDRD: 81 ML/MIN/1.73SQ M
GLUCOSE SERPL-MCNC: 142 MG/DL (ref 65–140)
HCT VFR BLD AUTO: 44.6 % (ref 36.5–49.3)
HGB BLD-MCNC: 15.4 G/DL (ref 12–17)
IMM GRANULOCYTES # BLD AUTO: 0.01 THOUSAND/UL (ref 0–0.2)
IMM GRANULOCYTES NFR BLD AUTO: 0 % (ref 0–2)
INR PPP: 0.89 (ref 0.84–1.19)
LYMPHOCYTES # BLD AUTO: 2.48 THOUSANDS/ÂΜL (ref 0.6–4.47)
LYMPHOCYTES NFR BLD AUTO: 39 % (ref 14–44)
MCH RBC QN AUTO: 32.1 PG (ref 26.8–34.3)
MCHC RBC AUTO-ENTMCNC: 34.5 G/DL (ref 31.4–37.4)
MCV RBC AUTO: 93 FL (ref 82–98)
MONOCYTES # BLD AUTO: 1.11 THOUSAND/ÂΜL (ref 0.17–1.22)
MONOCYTES NFR BLD AUTO: 18 % (ref 4–12)
NEUTROPHILS # BLD AUTO: 2.61 THOUSANDS/ÂΜL (ref 1.85–7.62)
NEUTS SEG NFR BLD AUTO: 41 % (ref 43–75)
NRBC BLD AUTO-RTO: 0 /100 WBCS
P AXIS: 60 DEGREES
PLATELET # BLD AUTO: 231 THOUSANDS/UL (ref 149–390)
PMV BLD AUTO: 10.3 FL (ref 8.9–12.7)
POTASSIUM SERPL-SCNC: 3.6 MMOL/L (ref 3.5–5.3)
PR INTERVAL: 148 MS
PROT SERPL-MCNC: 6.7 G/DL (ref 6.4–8.4)
PROTHROMBIN TIME: 12.6 SECONDS (ref 11.6–14.5)
QRS AXIS: 88 DEGREES
QRSD INTERVAL: 90 MS
QT INTERVAL: 380 MS
QTC INTERVAL: 482 MS
RBC # BLD AUTO: 4.8 MILLION/UL (ref 3.88–5.62)
SALICYLATES SERPL-MCNC: <5 MG/DL (ref 3–20)
SODIUM SERPL-SCNC: 144 MMOL/L (ref 135–147)
T WAVE AXIS: 47 DEGREES
VENTRICULAR RATE: 97 BPM
WBC # BLD AUTO: 6.31 THOUSAND/UL (ref 4.31–10.16)

## 2024-04-24 PROCEDURE — 80053 COMPREHEN METABOLIC PANEL: CPT | Performed by: EMERGENCY MEDICINE

## 2024-04-24 PROCEDURE — 93010 ELECTROCARDIOGRAM REPORT: CPT | Performed by: INTERNAL MEDICINE

## 2024-04-24 PROCEDURE — 80179 DRUG ASSAY SALICYLATE: CPT | Performed by: EMERGENCY MEDICINE

## 2024-04-24 PROCEDURE — 84484 ASSAY OF TROPONIN QUANT: CPT | Performed by: EMERGENCY MEDICINE

## 2024-04-24 PROCEDURE — 80143 DRUG ASSAY ACETAMINOPHEN: CPT | Performed by: EMERGENCY MEDICINE

## 2024-04-24 PROCEDURE — 99223 1ST HOSP IP/OBS HIGH 75: CPT | Performed by: EMERGENCY MEDICINE

## 2024-04-24 PROCEDURE — 99285 EMERGENCY DEPT VISIT HI MDM: CPT | Performed by: EMERGENCY MEDICINE

## 2024-04-24 PROCEDURE — 93005 ELECTROCARDIOGRAM TRACING: CPT

## 2024-04-24 PROCEDURE — 96374 THER/PROPH/DIAG INJ IV PUSH: CPT

## 2024-04-24 PROCEDURE — 96361 HYDRATE IV INFUSION ADD-ON: CPT

## 2024-04-24 PROCEDURE — 82077 ASSAY SPEC XCP UR&BREATH IA: CPT | Performed by: EMERGENCY MEDICINE

## 2024-04-24 PROCEDURE — 85025 COMPLETE CBC W/AUTO DIFF WBC: CPT | Performed by: EMERGENCY MEDICINE

## 2024-04-24 PROCEDURE — 99285 EMERGENCY DEPT VISIT HI MDM: CPT

## 2024-04-24 PROCEDURE — 85610 PROTHROMBIN TIME: CPT | Performed by: EMERGENCY MEDICINE

## 2024-04-24 PROCEDURE — HZ2ZZZZ DETOXIFICATION SERVICES FOR SUBSTANCE ABUSE TREATMENT: ICD-10-PCS | Performed by: EMERGENCY MEDICINE

## 2024-04-24 PROCEDURE — 36415 COLL VENOUS BLD VENIPUNCTURE: CPT | Performed by: EMERGENCY MEDICINE

## 2024-04-24 RX ORDER — TRAZODONE HYDROCHLORIDE 50 MG/1
50 TABLET ORAL
Status: DISCONTINUED | OUTPATIENT
Start: 2024-04-24 | End: 2024-04-26 | Stop reason: HOSPADM

## 2024-04-24 RX ORDER — LANOLIN ALCOHOL/MO/W.PET/CERES
100 CREAM (GRAM) TOPICAL ONCE
Status: COMPLETED | OUTPATIENT
Start: 2024-04-24 | End: 2024-04-24

## 2024-04-24 RX ORDER — SODIUM CHLORIDE 9 MG/ML
100 INJECTION, SOLUTION INTRAVENOUS CONTINUOUS
Status: DISCONTINUED | OUTPATIENT
Start: 2024-04-24 | End: 2024-04-25

## 2024-04-24 RX ORDER — HYDROXYZINE 50 MG/1
50 TABLET, FILM COATED ORAL EVERY 6 HOURS PRN
Status: DISCONTINUED | OUTPATIENT
Start: 2024-04-24 | End: 2024-04-26 | Stop reason: HOSPADM

## 2024-04-24 RX ORDER — ACETAMINOPHEN 325 MG/1
650 TABLET ORAL EVERY 6 HOURS PRN
Status: DISCONTINUED | OUTPATIENT
Start: 2024-04-24 | End: 2024-04-26 | Stop reason: HOSPADM

## 2024-04-24 RX ORDER — ENOXAPARIN SODIUM 100 MG/ML
40 INJECTION SUBCUTANEOUS DAILY
Status: DISCONTINUED | OUTPATIENT
Start: 2024-04-24 | End: 2024-04-26 | Stop reason: HOSPADM

## 2024-04-24 RX ORDER — OXCARBAZEPINE 300 MG/1
600 TABLET, FILM COATED ORAL EVERY 12 HOURS SCHEDULED
Status: DISCONTINUED | OUTPATIENT
Start: 2024-04-24 | End: 2024-04-26 | Stop reason: HOSPADM

## 2024-04-24 RX ORDER — ONDANSETRON 2 MG/ML
4 INJECTION INTRAMUSCULAR; INTRAVENOUS ONCE
Status: COMPLETED | OUTPATIENT
Start: 2024-04-24 | End: 2024-04-24

## 2024-04-24 RX ADMIN — OXCARBAZEPINE 600 MG: 300 TABLET, FILM COATED ORAL at 20:46

## 2024-04-24 RX ADMIN — THIAMINE HCL TAB 100 MG 100 MG: 100 TAB at 07:24

## 2024-04-24 RX ADMIN — OXCARBAZEPINE 600 MG: 300 TABLET, FILM COATED ORAL at 13:48

## 2024-04-24 RX ADMIN — SODIUM CHLORIDE 1000 ML: 0.9 INJECTION, SOLUTION INTRAVENOUS at 07:25

## 2024-04-24 RX ADMIN — SODIUM CHLORIDE 100 ML/HR: 0.9 INJECTION, SOLUTION INTRAVENOUS at 13:49

## 2024-04-24 RX ADMIN — HYDROXYZINE HYDROCHLORIDE 50 MG: 50 TABLET, FILM COATED ORAL at 20:47

## 2024-04-24 RX ADMIN — TRAZODONE HYDROCHLORIDE 50 MG: 50 TABLET ORAL at 20:46

## 2024-04-24 RX ADMIN — FOLIC ACID: 5 INJECTION, SOLUTION INTRAMUSCULAR; INTRAVENOUS; SUBCUTANEOUS at 15:44

## 2024-04-24 RX ADMIN — Medication 1 TABLET: at 07:24

## 2024-04-24 RX ADMIN — ONDANSETRON 4 MG: 2 INJECTION INTRAMUSCULAR; INTRAVENOUS at 07:25

## 2024-04-24 NOTE — ASSESSMENT & PLAN NOTE
Pt with a h/o chronic heavy alcohol use for the past 38 years   Reports he was admitted to inpatient psych hospitalization last week. Discharged on Friday and then relapsed. Has been drinking 1/2 handle of vodka over the last 3 days.   Prior inpatient treatment hx, last inpatient rehab:   Previously tried naltrexone and vivitrol,  Will restart Naltrexone today   Withdrawal management as above  Discontinue IVFs  Continue thiamine, folic acid, and MVI  Consult case management/CRS for assistance with aftercare resources - pt interested in outpatient resources at this time

## 2024-04-24 NOTE — ASSESSMENT & PLAN NOTE
Pt with a h/o chronic heavy alcohol use for the past 38 years   Reports he was admitted to inpatient psych hospitalization last week. Discharged on Friday and then relapsed. Has been drinking 1/2 handle of vodka over the last 3 days.   Prior inpatient treatment hx, last inpatient rehab:   Previously tried naltrexone and vivitrol- states he is considering restarting naltrexone   Withdrawal management as above  Initiate IVFs, IV thiamine, folic acid, and MVI  Consult case management/CRS for assistance with aftercare resources - pt interested in outpatient resources at this time

## 2024-04-24 NOTE — ED PROVIDER NOTES
History  Chief Complaint   Patient presents with    Withdrawal - Alcohol     Pt step mom reports pt has been lost in [place] the last 48hrs on a drinking binge. Pt has been drinking for 38yrs. Last drink was 10hrs ago. Had a recent stay at [facility] for depression.      54-year-old male patient presents emergency department for evaluation of alcohol intoxication.  The patient is currently depressed however his alcohol level is 300.  He denies suicidality or homicidality as far as having an active plan for suicide but he does state that he is depressed.  He is amenable to discussing inpatient treatment however with his alcohol level of 300 the appropriate time would be when it diminishes slightly.  Patient has been drinking heavily for some time.  Currently is awake alert and oriented, aware of his condition, but stating any questions, he is here willingly.      History provided by:  Patient   used: No    Withdrawal - Alcohol  Similar prior episodes: yes    Severity:  Moderate  Onset quality:  Gradual  Timing:  Constant  Progression:  Worsening  Associated symptoms: blackouts, loss of consciousness, nausea and suicidal ideation    Associated symptoms: no abdominal pain, no hallucinations and no headaches        Prior to Admission Medications   Prescriptions Last Dose Informant Patient Reported? Taking?   bacitracin ointment   No No   Sig: Apply 1 g topically 3 (three) times a day for 7 days      Facility-Administered Medications: None       Past Medical History:   Diagnosis Date    Depression     ETOH abuse     Fatty liver     Hypertension        Past Surgical History:   Procedure Laterality Date    ADENOIDECTOMY      TONSILLECTOMY         History reviewed. No pertinent family history.  I have reviewed and agree with the history as documented.    E-Cigarette/Vaping    E-Cigarette Use Never User      E-Cigarette/Vaping Substances     Social History     Tobacco Use    Smoking status: Never     "Smokeless tobacco: Never   Vaping Use    Vaping status: Never Used   Substance Use Topics    Alcohol use: Yes     Alcohol/week: 3.0 standard drinks of alcohol     Types: 3 Standard drinks or equivalent per week    Drug use: Not Currently     Types: Cocaine, \"Crack\" cocaine, Methamphetamines       Review of Systems   Gastrointestinal:  Positive for nausea. Negative for abdominal pain.   Neurological:  Positive for loss of consciousness. Negative for headaches.   Psychiatric/Behavioral:  Positive for suicidal ideas. Negative for hallucinations.    All other systems reviewed and are negative.      Physical Exam  Physical Exam  Vitals and nursing note reviewed.   Constitutional:       General: He is not in acute distress.     Appearance: He is well-developed. He is not diaphoretic.   HENT:      Head: Normocephalic and atraumatic.      Right Ear: External ear normal.      Left Ear: External ear normal.   Eyes:      General: No scleral icterus.        Right eye: No discharge.         Left eye: No discharge.      Conjunctiva/sclera: Conjunctivae normal.   Neck:      Thyroid: No thyromegaly.      Vascular: No JVD.      Trachea: No tracheal deviation.   Cardiovascular:      Rate and Rhythm: Normal rate and regular rhythm.   Pulmonary:      Effort: Pulmonary effort is normal. No respiratory distress.      Breath sounds: Normal breath sounds. No stridor. No wheezing or rales.   Abdominal:      General: Bowel sounds are normal. There is no distension.      Palpations: Abdomen is soft.      Tenderness: There is no abdominal tenderness.   Musculoskeletal:         General: No tenderness or deformity. Normal range of motion.      Cervical back: Normal range of motion and neck supple.   Skin:     General: Skin is warm and dry.   Neurological:      Mental Status: He is alert and oriented to person, place, and time.      Cranial Nerves: No cranial nerve deficit.      Coordination: Coordination normal.   Psychiatric:         Behavior: " Behavior normal.         Vital Signs  ED Triage Vitals   Temperature Pulse Respirations Blood Pressure SpO2   04/24/24 0645 04/24/24 0642 04/24/24 0642 04/24/24 0642 04/24/24 0642   97.9 °F (36.6 °C) 97 18 151/87 96 %      Temp src Heart Rate Source Patient Position - Orthostatic VS BP Location FiO2 (%)   -- 04/24/24 0642 -- -- --    Monitor         Pain Score       --                  Vitals:    04/24/24 0645 04/24/24 0653 04/24/24 0700 04/24/24 0730   BP: 151/87 151/87 127/75 127/76   Pulse: 98 93 87 77         Visual Acuity      ED Medications  Medications   sodium chloride 0.9 % bolus 1,000 mL (1,000 mL Intravenous New Bag 4/24/24 0725)   thiamine tablet 100 mg (100 mg Oral Given 4/24/24 0724)   multivitamin-minerals (CENTRUM) tablet 1 tablet (1 tablet Oral Given 4/24/24 0724)   ondansetron (ZOFRAN) injection 4 mg (4 mg Intravenous Given 4/24/24 0725)       Diagnostic Studies  Results Reviewed       Procedure Component Value Units Date/Time    HS Troponin I 4hr [[id number]]     Lab Status: No result Specimen: Blood     Comprehensive metabolic panel [[id number]]  (Abnormal) Collected: 04/24/24 0723    Lab Status: Final result Specimen: Blood from Arm, Left Updated: 04/24/24 0747     Sodium 144 mmol/L      Potassium 3.6 mmol/L      Chloride 107 mmol/L      CO2 28 mmol/L      ANION GAP 9 mmol/L      BUN 9 mg/dL      Creatinine 1.02 mg/dL      Glucose 142 mg/dL      Calcium 8.8 mg/dL      AST 36 U/L      ALT 31 U/L      Alkaline Phosphatase 63 U/L      Total Protein 6.7 g/dL      Albumin 4.2 g/dL      Total Bilirubin 0.27 mg/dL      eGFR 81 ml/min/1.73sq m     Narrative:      National Kidney Disease Foundation guidelines for Chronic Kidney Disease (CKD):     Stage 1 with normal or high GFR (GFR > 90 mL/min/1.73 square meters)    Stage 2 Mild CKD (GFR = 60-89 mL/min/1.73 square meters)    Stage 3A Moderate CKD (GFR = 45-59 mL/min/1.73 square meters)    Stage 3B Moderate CKD (GFR = 30-44 mL/min/1.73 square meters)     Stage 4 Severe CKD (GFR = 15-29 mL/min/1.73 square meters)    Stage 5 End Stage CKD (GFR <15 mL/min/1.73 square meters)  Note: GFR calculation is accurate only with a steady state creatinine    Salicylate level [[id number]]  (Normal) Collected: 04/24/24 0723    Lab Status: Final result Specimen: Blood from Arm, Left Updated: 04/24/24 0747     Salicylate Lvl <5 mg/dL     Acetaminophen level-If concentration is detectable, please discuss with medical  on call. [[id number]]  (Abnormal) Collected: 04/24/24 0723    Lab Status: Final result Specimen: Blood from Arm, Left Updated: 04/24/24 0747     Acetaminophen Level <2 ug/mL     Ethanol [[id number]]  (Abnormal) Collected: 04/24/24 0716    Lab Status: Final result Specimen: Blood from Arm, Right Updated: 04/24/24 0746     Ethanol Lvl 300 mg/dL     HS Troponin I 2hr [[id number]]     Lab Status: No result Specimen: Blood     HS Troponin 0hr (reflex protocol) [[id number]]  (Normal) Collected: 04/24/24 0716    Lab Status: Final result Specimen: Blood from Arm, Right Updated: 04/24/24 0745     hs TnI 0hr 5 ng/L     Protime-INR [[id number]]  (Normal) Collected: 04/24/24 0716    Lab Status: Final result Specimen: Blood from Arm, Right Updated: 04/24/24 0738     Protime 12.6 seconds      INR 0.89    CBC and differential [[id number]]  (Abnormal) Collected: 04/24/24 0716    Lab Status: Final result Specimen: Blood from Arm, Right Updated: 04/24/24 0724     WBC 6.31 Thousand/uL      RBC 4.80 Million/uL      Hemoglobin 15.4 g/dL      Hematocrit 44.6 %      MCV 93 fL      MCH 32.1 pg      MCHC 34.5 g/dL      RDW 14.4 %      MPV 10.3 fL      Platelets 231 Thousands/uL      nRBC 0 /100 WBCs      Segmented % 41 %      Immature Grans % 0 %      Lymphocytes % 39 %      Monocytes % 18 %      Eosinophils Relative 1 %      Basophils Relative 1 %      Absolute Neutrophils 2.61 Thousands/µL      Absolute Immature Grans 0.01 Thousand/uL      Absolute Lymphocytes 2.48 Thousands/µL       Absolute Monocytes 1.11 Thousand/µL      Eosinophils Absolute 0.03 Thousand/µL      Basophils Absolute 0.07 Thousands/µL                    No orders to display              Procedures  Procedures         ED Course                                             Medical Decision Making  Amount and/or Complexity of Data Reviewed  Labs: ordered.    Risk  OTC drugs.  Prescription drug management.             Disposition  Final diagnoses:   None     ED Disposition       None          Follow-up Information    None         Patient's Medications   Discharge Prescriptions    No medications on file       No discharge procedures on file.    PDMP Review       None            ED Provider  Electronically Signed by

## 2024-04-24 NOTE — CERTIFIED RECOVERY SPECIALIST
"   Certified  Note    Patient name: Dayton Daley  Location: 5T DETOX 506/5T DETOX 50*  [place]: [facility]  Attending:  Shruthi Stack* MRN [id number]  : 1970  Age: 54 y.o.    Sex: male Date 2024         Substance Use History:     Social History     Substance and Sexual Activity   Alcohol Use Yes    Alcohol/week: 3.0 standard drinks of alcohol    Types: 3 Standard drinks or equivalent per week        Social History     Substance and Sexual Activity   Drug Use Not Currently    Types: Cocaine, \"Crack\" cocaine, Methamphetamines         Admission Information  Substances Used at This Admission:: Alcohol  Readmission in Last 30 Days?: No    Recovery Support Plan  Was Narcan Provided at Discharge?: No  Plan Discussed With Treatment Team:: Yes  Plan Discussed With:: Nurse    Referral to Recovery Supports:  Recovery Center:: No  Community Based CRS:: No  Case Management:: No  Direct Access to [facility]?: No  Resource Guide Given?: No  Follow Up With Patient::  (until discharge)'    CRS provided introductions and explanation of service. CRS and patient engaged in conversation of hospitalization. Patient was just transferred from other facility and very tired. Will follow up tomorrow.   Michelle Worley       "

## 2024-04-24 NOTE — EMTALA/ACUTE CARE TRANSFER
[facility] EMERGENCY DEPARTMENT  [address]  [place] [address]  Dept: [phone]      EMTALA TRANSFER CONSENT    NAME Dayton Daley                                         1970                              MRN [id number]    I have been informed of my rights regarding examination, treatment, and transfer   by Dr. Jamshid Murdock,     Benefits: Specialized equipment and/or services available at the receiving facility (Include comment)________________________    Risks:        Transfer Request   I acknowledge that my medical condition has been evaluated and explained to me by the emergency department physician or other qualified medical person and/or my attending physician who has recommended and offered to me further medical examination and treatment. I understand the Hospital's obligation with respect to the treatment and stabilization of my emergency medical condition. I nevertheless request to be transferred. I release the Hospital, the doctor, and any other persons caring for me from all responsibility or liability for any injury or ill effects that may result from my transfer and agree to accept all responsibility for the consequences of my choice to transfer, rather than receive stabilizing treatment at the Hospital. I understand that because the transfer is my request, my insurance may not provide reimbursement for the services.  The Hospital will assist and direct me and my family in how to make arrangements for transfer, but the hospital is not liable for any fees charged by the transport service.  In spite of this understanding, I refuse to consent to further medical examination and treatment which has been offered to me, and request transfer to  . I authorize the performance of emergency medical procedures and treatments upon me in both transit and upon arrival at the receiving facility.  Additionally, I authorize the release of any and all medical records to  the receiving facility and request they be transported with me, if possible.    I authorize the performance of emergency medical procedures and treatments upon me in both transit and upon arrival at the receiving facility.  Additionally, I authorize the release of any and all medical records to the receiving facility and request they be transported with me, if possible.  I understand that the safest mode of transportation during a medical emergency is an ambulance and that the Hospital advocates the use of this mode of transport. Risks of traveling to the receiving facility by car, including absence of medical control, life sustaining equipment, such as oxygen, and medical personnel has been explained to me and I fully understand them.    (SEBLE CORRECT BOX BELOW)  [  ]  I consent to the stated transfer and to be transported by ambulance/helicopter.  [  ]  I consent to the stated transfer, but refuse transportation by ambulance and accept full responsibility for my transportation by car.  I understand the risks of non-ambulance transfers and I exonerate the Hospital and its staff from any deterioration in my condition that results from this refusal.    X___________________________________________    DATE  24  TIME________  Signature of patient or legally responsible individual signing on patient behalf           RELATIONSHIP TO PATIENT_________________________          Provider Certification    NAME Dayton Daley                                         1970                              MRN [id number]    A medical screening exam was performed on the above named patient.  Based on the examination:    Condition Necessitating Transfer The encounter diagnosis was Alcohol withdrawal (HCC).    Patient Condition: The patient has been stabilized such that within reasonable medical probability, no material deterioration of the patient condition or the condition of the unborn child(maria r) is likely to result from  the transfer    Reason for Transfer:      Transfer Requirements: Facility     Space available and qualified personnel available for treatment as acknowledged by    Agreed to accept transfer and to provide appropriate medical treatment as acknowledged by          Appropriate medical records of the examination and treatment of the patient are provided at the time of transfer   STAFF INITIAL WHEN COMPLETED _______  Transfer will be performed by qualified personnel from    and appropriate transfer equipment as required, including the use of necessary and appropriate life support measures.    Provider Certification: I have examined the patient and explained the following risks and benefits of being transferred/refusing transfer to the patient/family:         Based on these reasonable risks and benefits to the patient and/or the unborn child(maria r), and based upon the information available at the time of the patient’s examination, I certify that the medical benefits reasonably to be expected from the provision of appropriate medical treatments at another medical facility outweigh the increasing risks, if any, to the individual’s medical condition, and in the case of labor to the unborn child, from effecting the transfer.    X____________________________________________ DATE 04/24/24        TIME_______      ORIGINAL - SEND TO MEDICAL RECORDS   COPY - SEND WITH PATIENT DURING TRANSFER

## 2024-04-24 NOTE — ASSESSMENT & PLAN NOTE
Patient endorses a h/o chronic anxiety and depression  Recent inpatient hospitalization at [facility] for worsening depression   Home medications include: Trileptal 600 mg BID and Vistaril   Denies SI/HI/thoughts of self harm  Continue home medications  Inpatient consult to psychiatry; appreciate recommendations   Recommend OP f/u with PCP/OP Psychiatry

## 2024-04-24 NOTE — H&P
HISTORY & PHYSICAL EXAM  DEPARTMENT OF MEDICAL TOXICOLOGY  LEVEL 4 MEDICAL DETOX UNIT  Dayton Daley 54 y.o. male MRN: [id number]  Unit/Bed#: [address] [place] [address] [id number] Encounter: [id number]      Reason for Admission/Principal Problem: Ethanol withdrawal, Ethanol use disorder  Admitting Provider: Ilene Dominguez PA-C  Attending Provider: Shruthi Stack*   4/24/2024 12:46 PM        * Alcohol withdrawal syndrome with complication (HCC)  Assessment & Plan  H/o chronic daily alcohol consumption, denies h/o withdrawal seizures  Last drink: 4/24/24   Ethanol lvl: 300   Follow SEWS protocol with symptom-triggered phenobarbital for medically-assisted alcohol withdrawal  Currently not endorsing withdrawal symptoms  Will continue to monitor on SEWS protocol and dose phenobarbital as indicated.   Telemetry and pulse oximetry monitoring   Continue to monitor vitals, symptoms        Alcohol use disorder, severe, dependence (HCC)  Assessment & Plan  Pt with a h/o chronic heavy alcohol use for the past 38 years   Reports he was admitted to inpatient psych hospitalization last week. Discharged on Friday and then relapsed. Has been drinking 1/2 handle of vodka over the last 3 days.   Prior inpatient treatment hx, last inpatient rehab:   Previously tried naltrexone and vivitrol- states he is considering restarting naltrexone   Withdrawal management as above  Initiate IVFs, IV thiamine, folic acid, and MVI  Consult case management/CRS for assistance with aftercare resources - pt interested in outpatient resources at this time     HTN (hypertension)  Assessment & Plan  Hx of hypertension  Not on any current medication  Continue to monitor BP   Will initiate anti-hypertensive if patient BP continues to be elevated despite adequate withdrawal management     Anxiety and depression  Assessment & Plan  Patient endorses a h/o chronic anxiety and depression  Recent inpatient hospitalization at [facility] for worsening depression   Home  medications include: Trileptal 600 mg BID and Vistaril   Denies SI/HI/thoughts of self harm  Continue home medications  Inpatient consult to psychiatry; appreciate recommendations   Recommend OP f/u with PCP/OP Psychiatry                VTE Prophylaxis: Enoxaparin (Lovenox)  / sequential compression device   Code Status: Full Code       Anticipated Length of Stay:  Patient will be admitted on an Inpatient basis with an anticipated length of stay of  2  midnights.   Justification for Hospital Stay: alcohol withdrawal     For any questions or concerns, please Tiger Text the advanced practitioner in the role of [facility] On Call      This patient qualifies for Level IV medically managed intensive inpatient services under the criteria set by the American Society of Addiction Medicine, including dimensions 1-3. The patient is in withdrawal (or is intoxicated with high risk of withdrawal), with severe and unstable medical and/or psychiatric (dual diagnosis) problems, requiring requires 24-hour medical and nursing care and the full resources of a [facility] hospital.          SEWS PHENOBARBITAL PROTOCOL FOR ALCOHOL WITHDRAWAL    Admit patient to medical detox unit and continue supportive care and stabilization of acute ethanol withdrawal per medical toxicology/detox treatment pathway. Monitor ethanol withdrawal severity via the Severity of Ethanol Withdrawal Scale (SEWS) Q4 hours and then hourly if/when SEWS > 6. Treat withdrawal per pathway and reassess Q30-60 minutes.           Mild SEWS Score 1-6  Administer medications* (IV or PO; PO preferred):  If initial SEWS score: diazepam 10mg PO/IV x 1 AND phenobarbital 65 mg PO/IV x 1  If repeat SEWS score 1-6: phenobarbital 65 mg PO/IV q1 hour x 5 doses maximum   Reassessment:   SEWS q1 hour after each dose until SEWS 0 x 2 hours  VS q1 hours (until SEWS 0, then q4 hours)  Notify provider for bedside evaluation if 5-dose maximum is reached, RASS of -3 to -5, or SEWS score  escalates to moderate or severe.   Moderate SEWS Score 7-12  Administer medications* (IV):  If initial SEWS score: diazepam 10mg IV x 1 AND phenobarbital 260 mg IV x 1  If repeat SEWS score 7-12 or score escalated from mild: phenobarbital 130 mg IV q30 minutes x 5 doses maximum   Reassessment:  SEWS q30 minutes after each dose until SEWS < 7 (then hourly until SEWS 0 x 2 hours)  VS q30 minutes until SEWS < 7 (then hourly until SEWS 0, then q4 hours)  Notify provider for bedside evaluation if 5-dose maximum is reached, RASS of -3 to -5, or SEWS score escalates to severe.   Severe SEWS Score ? 13  Administer medications* (IV):  If initial SEWS score: Diazepam 10 mg IV x 1 AND phenobarbital 650 mg IV piggyback x 1 over 15-30 minutes  If repeat SEWS score ? 13 or score escalated from mild or moderate: phenobarbital 130 mg IV q30 minutes x 5 doses maximum   Reassessment:  SEWS q30 minutes after each dose until SEWS < 7 (then hourly until SEWS 0 x 2 hours)   VS q30 minutes until SEWS < 7 (then hourly until SEWS 0, then q4 hours)  Notify provider for bedside evaluation if 5-dose maximum is reached or RASS of -3 to -5   *Hold medications and notify provider if CNS depression, respirations < 10/min, or RASS of -3 to -5.         Medications to be administered adjunctively if more than 2 grams of phenobarbital is needed for stabilization of withdrawal; require attending approval.   Dexmedetomidine infusion 0.1-1mcg/kg/hr IV infusion, titratable to reduced agitation (Goal: RASS -2)  Ketamine   Acute agitated delirium: 1-2 mg/kg IV or 4-5 mg/kg IM  Refractory withdrawal: 0.1-1mg/kg/hr IV infusion, titratable to reduced agitation (Goal: RASS -2)    Further evaluation, screening and treatment:  Evaluate complete metabolic panel, transaminases, INR, and lipase. Assess hepatic ultrasound for any sign of alcoholic liver disease or cirrhosis, and ultimately refer for further hepatic evaluation and care as/if indicated.    Additional  "medications for ethanol associated malnutrition:  Thiamine 100 mg IV daily, increase to 500 mg TID for signs/symptoms of Wernicke's Encephalopathy or Wernicke Korsakoff Syndrome   Folic acid 1 mg IV daily   Multivitamin PO daily      Will offer first monthly injection of Naltrexone 380 mg IM, once patient is stabilized, as it has been shown to assist in decreasing cravings for ethanol.     Evaluate and treat for coexisting substance use, such as opioids and nicotine. Discuss risk factors for infectious disease, such as history of intravenous drug abuse, and offer hepatitis and HIV screening if indicated.     Case management consultation to assist with coordination of subsequent treatment after discharge.          Hx and PE limited by: none     HPI: Dayton Daley is a 54 y.o. year old male who presents with alcohol withdrawal seeking detoxification. Patient initially presented to the [facility] ED requesting alcohol detox with acute and was subsequently transferred to the [facility] medical detox unit for medical management of alcohol withdrawal. Pt reports drinking daily for the past 38 years. His last drink was 4/24/24, ethanol level 300. Per provider patient's family had accompanied him to the ED. Patient reports that he was recently hospitalized at [facility] last week due to increasing depression. He states that he was discharged a \" few days ago\" and relapsed. Over the last two days he states drank 1/2 handle of vodka. Has previous tried naltrexone and vivitrol. Interested in restarting naltrexone. During recent inpatient psych admission patient started on Trileptal and Vistaril. He reports ongoing depressive symptoms, denies SI/HI.     Preferred alcoholic beverage(s): Vodka   Quantity and frequency of alcohol intake: 1/2 handle daily   Use of any ethanol substitutes (toxic alcohols): no  Date/Time of last alcohol intake: 4/24/24   Current signs and symptoms of ethanol withdrawal: anxiety    SEWS " "Total Score: 0 (4/24/2024  1:19 PM)        Ethanol Withdrawal History  Previous ethanol withdrawal? yes  Prior inpatient treatment for ethanol withdrawal? yes  Prior outpatient treatment for ethanol withdrawal? yes  History of seizures with prior ethanol withdrawal? no  Prior treatment with naltrexone (Vivitrol)? yes  Current treatment with naltrexone (Vivitrol)? no  Other current treatment for ethanol use disorder? no  Co-existing substance use? No     Review of PDMP: yes     Social History     Substance and Sexual Activity   Alcohol Use Yes    Alcohol/week: 3.0 standard drinks of alcohol    Types: 3 Standard drinks or equivalent per week     Social History     Substance and Sexual Activity   Drug Use Not Currently    Types: Cocaine, \"Crack\" cocaine, Methamphetamines     Social History     Tobacco Use   Smoking Status Never   Smokeless Tobacco Never       Review of Systems   Constitutional:  Negative for diaphoresis.   Respiratory: Negative.     Cardiovascular: Negative.    Gastrointestinal: Negative.  Negative for diarrhea, nausea and vomiting.   Neurological:  Negative for tremors.   Psychiatric/Behavioral:  The patient is nervous/anxious.        Historical Information   Past Medical History:   Diagnosis Date    Depression     ETOH abuse     Fatty liver     Hypertension      Past Surgical History:   Procedure Laterality Date    ADENOIDECTOMY      TONSILLECTOMY       No family history on file.  Social History   Marital Status: Legally    Patient Pre-hospital Living Situation: Lives with family   Patient Pre-hospital Level of Mobility: Independent  Patient Pre-hospital Diet Restrictions: None     No Known Allergies    Prior to Admission medications    Medication Sig Start Date End Date Taking? Authorizing Provider   bacitracin ointment Apply 1 g topically 3 (three) times a day for 7 days 7/2/17 7/9/17  Jitendra Dominguez MD       Current Facility-Administered Medications   Medication Dose Route Frequency    " acetaminophen (TYLENOL) tablet 650 mg  650 mg Oral Q6H PRN    enoxaparin (LOVENOX) subcutaneous injection 40 mg  40 mg Subcutaneous Daily    folic acid 1 mg, thiamine (VITAMIN B1) 100 mg in sodium chloride 0.9 % 100 mL IV piggyback   Intravenous Daily    hydrOXYzine HCL (ATARAX) tablet 50 mg  50 mg Oral Q6H PRN    OXcarbazepine (TRILEPTAL) tablet 600 mg  600 mg Oral Q12H DOROTA    sodium chloride 0.9 % infusion  100 mL/hr Intravenous Continuous    traZODone (DESYREL) tablet 50 mg  50 mg Oral HS PRN       Continuous Infusions:sodium chloride, 100 mL/hr, Last Rate: 100 mL/hr (04/24/24 1349)             Objective     No intake or output data in the 24 hours ending 04/24/24 1410    Invasive Devices:   Peripheral IV 04/24/24 Left Antecubital (Active)   Site Assessment WDL;Clean;Dry;Intact 04/24/24 0725   Line Status Blood return noted;Flushed 04/24/24 0725   Dressing Status Clean;Dry;Intact 04/24/24 0725       Vitals   Vitals:    04/24/24 1252   BP: 129/79   TempSrc: Temporal   Pulse: 76   Resp: 18   Patient Position - Orthostatic VS: Lying   Temp: 98.1 °F (36.7 °C)       Physical Exam  Vitals and nursing note reviewed.   Constitutional:       General: He is not in acute distress.     Appearance: He is not diaphoretic.   Cardiovascular:      Rate and Rhythm: Normal rate and regular rhythm.      Heart sounds: No murmur heard.  Pulmonary:      Effort: No respiratory distress.      Breath sounds: Normal breath sounds.   Abdominal:      General: Bowel sounds are normal. There is no distension.      Palpations: Abdomen is soft.   Neurological:      Mental Status: He is alert and oriented to person, place, and time.   Psychiatric:         Mood and Affect: Mood is not anxious or depressed.           Data:    EKG, Pathology, and Other Studies: I have personally reviewed pertinent reports.    EKG: Ventricular Rate 97 bpm     Lab Results:  CBC ETOH     Lab Results   Component Value Date    WBC 6.31 04/24/2024    RBC 4.80 04/24/2024  "   HGB 15.4 04/24/2024    HCT 44.6 04/24/2024    MCV 93 04/24/2024    MCH 32.1 04/24/2024    MCHC 34.5 04/24/2024    RDW 14.4 04/24/2024     04/24/2024    MPV 10.3 04/24/2024      No results found for: \"LACTICACID\"   CMP UA         Component Value Date/Time    K 3.6 04/24/2024 0723    K 3.8 05/15/2023 0409     04/24/2024 0723     (H) 05/15/2023 0409    CO2 28 04/24/2024 0723    CO2 25 05/15/2023 0409    BUN 9 04/24/2024 0723    BUN 14 05/15/2023 0409    CREATININE 1.02 04/24/2024 0723    CREATININE 0.79 05/15/2023 0409         Component Value Date/Time    CALCIUM 8.8 04/24/2024 0723    CALCIUM 8.6 05/15/2023 0409    ALKPHOS 63 04/24/2024 0723    ALKPHOS 59 05/15/2023 0409    AST 36 04/24/2024 0723    AST 27 05/15/2023 0409    ALT 31 04/24/2024 0723    ALT 29 05/15/2023 0409      No results found for: \"CLARITYU\", \"COLORU\", \"SPECGRAV\", \"PHUR\", \"GLUCOSEU\", \"KETONESU\", \"BLOODU\", \"PROTEIN UA\", \"NITRITE\", \"BILIRUBINUR\", \"UROBILINOGEN\", \"LEUKOCYTESUR\", \"WBCUA\", \"RBCUA\", \"HYALINE\", \"BACTERIA\", \"EPIS\"     Liver Function Test: ASA     Lab Results   Component Value Date    TBILI 0.27 04/24/2024    TBILI 0.3 05/15/2023    ALKPHOS 63 04/24/2024    ALKPHOS 59 05/15/2023    AST 36 04/24/2024    AST 27 05/15/2023    ALT 31 04/24/2024    ALT 29 05/15/2023    TP 6.7 04/24/2024    TP 6.6 05/15/2023    ALB 4.2 04/24/2024    ALB 4.0 05/15/2023      Lab Results   Component Value Date    SALICYLATE <5 04/24/2024      Troponin APAP     Lab Results   Component Value Date    TROPONINI <0.02 08/30/2021      Lab Results   Component Value Date    ACTMNPHEN <2 (L) 04/24/2024      VBG HCG     No results found for: \"PHVEN\", \"MAK4KIM\", \"PO2VEN\", \"CVM7WLB\", \"BEVEN\", \"X1VGMVTWA\", \"F7OKZJY\"   No results found for: \"HCGQUANT\"   ABG Urine Drug Screen     No results found for: \"PHART\", \"MDE4BHW\", \"PO2ART\", \"GIM6JBW\", \"BEART\", \"Z2XLGSZKO\", \"O2HGB\", \"SOURC\", \"MAUREEN\", \"VTAC\", \"ACRATE\", \"INSPIREDAIR\", \"PEEP\"   Lab Results   Component Value " "Date    AMPMETHUR Negative 08/30/2021    BARBTUR Negative 08/30/2021    BDZUR Negative 08/30/2021    COCAINEUR Negative 08/30/2021    METHADONEUR Negative 08/30/2021    OPIATEUR Negative 08/30/2021    PCPUR Negative 08/30/2021    THCUR Negative 08/30/2021    OXYCODONEUR Negative 08/30/2021      Lactate INR     No results found for: \"LACTICACID\"   Lab Results   Component Value Date    INR 0.89 04/24/2024      PTT Protime     No results found for: \"PTT\"     Lab Results   Component Value Date/Time    PROTIME 12.6 04/24/2024 07:16 AM              Imaging Studies: I have personally reviewed pertinent reports.        Counseling / Coordination of Care  Total floor / unit time spent today 45 minutes. Greater than 50% of total time was spent with the patient and / or family counseling and / or coordination of care.             ** Please Note: This note has been constructed using a voice recognition system. **   "

## 2024-04-24 NOTE — ASSESSMENT & PLAN NOTE
Hx of hypertension  Not on any current medication  Continue to monitor BP   Will initiate anti-hypertensive if patient BP continues to be elevated despite adequate withdrawal management

## 2024-04-24 NOTE — ASSESSMENT & PLAN NOTE
H/o chronic daily alcohol consumption, denies h/o withdrawal seizures  Last drink: 4/24/24   Ethanol lvl: 300   Continue SEWS protocol with symptom-triggered phenobarbital for medically-assisted alcohol withdrawal  Currently not endorsing withdrawal symptoms. Patient has not received any phenobarbital. Will continue to monitor on protocol until this afternoon.   Telemetry and pulse oximetry monitoring   Continue to monitor vitals, symptoms

## 2024-04-24 NOTE — ASSESSMENT & PLAN NOTE
Patient endorses a h/o chronic anxiety and depression  Recent inpatient hospitalization at [facility] for worsening depression   Home medications include: Trileptal 600 mg BID and Vistaril   Denies SI/HI/thoughts of self harm  Continue home medications  Inpatient consult to psychiatry; appreciate recommendations   Recommend OP f/u with PCP/OP Psychiatry

## 2024-04-24 NOTE — NURSING NOTE
Pt signed JESUS for Karen smallwood. Phone number [phone]  RN made contact to notify Karen of admission.

## 2024-04-24 NOTE — ASSESSMENT & PLAN NOTE
H/o chronic daily alcohol consumption, denies h/o withdrawal seizures  Last drink: 4/24/24   Ethanol lvl: 300   Follow SEWS protocol with symptom-triggered phenobarbital for medically-assisted alcohol withdrawal  Currently not endorsing withdrawal symptoms  Will continue to monitor on SEWS protocol and dose phenobarbital as indicated.   Telemetry and pulse oximetry monitoring   Continue to monitor vitals, symptoms

## 2024-04-25 PROBLEM — F10.939 ALCOHOL WITHDRAWAL SYNDROME WITH COMPLICATION (HCC): Status: RESOLVED | Noted: 2024-04-24 | Resolved: 2024-04-25

## 2024-04-25 PROBLEM — E83.42 HYPOMAGNESEMIA: Status: ACTIVE | Noted: 2024-04-25

## 2024-04-25 LAB
ALBUMIN SERPL BCP-MCNC: 3.4 G/DL (ref 3.5–5)
ALP SERPL-CCNC: 45 U/L (ref 34–104)
ALT SERPL W P-5'-P-CCNC: 26 U/L (ref 7–52)
ANION GAP SERPL CALCULATED.3IONS-SCNC: 7 MMOL/L (ref 4–13)
AST SERPL W P-5'-P-CCNC: 27 U/L (ref 13–39)
BILIRUB SERPL-MCNC: 0.61 MG/DL (ref 0.2–1)
BUN SERPL-MCNC: 12 MG/DL (ref 5–25)
CALCIUM ALBUM COR SERPL-MCNC: 8.7 MG/DL (ref 8.3–10.1)
CALCIUM SERPL-MCNC: 8.2 MG/DL (ref 8.4–10.2)
CHLORIDE SERPL-SCNC: 108 MMOL/L (ref 96–108)
CO2 SERPL-SCNC: 25 MMOL/L (ref 21–32)
CREAT SERPL-MCNC: 0.9 MG/DL (ref 0.6–1.3)
ERYTHROCYTE [DISTWIDTH] IN BLOOD BY AUTOMATED COUNT: 14.5 % (ref 11.6–15.1)
GFR SERPL CREATININE-BSD FRML MDRD: 95 ML/MIN/1.73SQ M
GLUCOSE SERPL-MCNC: 103 MG/DL (ref 65–140)
HCT VFR BLD AUTO: 40.4 % (ref 36.5–49.3)
HGB BLD-MCNC: 14.2 G/DL (ref 12–17)
MAGNESIUM SERPL-MCNC: 1.8 MG/DL (ref 1.9–2.7)
MCH RBC QN AUTO: 33.2 PG (ref 26.8–34.3)
MCHC RBC AUTO-ENTMCNC: 35.1 G/DL (ref 31.4–37.4)
MCV RBC AUTO: 94 FL (ref 82–98)
PLATELET # BLD AUTO: 176 THOUSANDS/UL (ref 149–390)
PMV BLD AUTO: 10.3 FL (ref 8.9–12.7)
POTASSIUM SERPL-SCNC: 3.5 MMOL/L (ref 3.5–5.3)
PROT SERPL-MCNC: 5.7 G/DL (ref 6.4–8.4)
RBC # BLD AUTO: 4.28 MILLION/UL (ref 3.88–5.62)
SODIUM SERPL-SCNC: 140 MMOL/L (ref 135–147)
WBC # BLD AUTO: 4.75 THOUSAND/UL (ref 4.31–10.16)

## 2024-04-25 PROCEDURE — 83735 ASSAY OF MAGNESIUM: CPT

## 2024-04-25 PROCEDURE — 99232 SBSQ HOSP IP/OBS MODERATE 35: CPT

## 2024-04-25 PROCEDURE — 85027 COMPLETE CBC AUTOMATED: CPT

## 2024-04-25 PROCEDURE — 80053 COMPREHEN METABOLIC PANEL: CPT

## 2024-04-25 PROCEDURE — 99254 IP/OBS CNSLTJ NEW/EST MOD 60: CPT | Performed by: PSYCHIATRY & NEUROLOGY

## 2024-04-25 RX ORDER — LANOLIN ALCOHOL/MO/W.PET/CERES
100 CREAM (GRAM) TOPICAL DAILY
Status: DISCONTINUED | OUTPATIENT
Start: 2024-04-26 | End: 2024-04-26 | Stop reason: HOSPADM

## 2024-04-25 RX ORDER — FOLIC ACID 1 MG/1
1 TABLET ORAL DAILY
Status: DISCONTINUED | OUTPATIENT
Start: 2024-04-26 | End: 2024-04-26 | Stop reason: HOSPADM

## 2024-04-25 RX ORDER — LANOLIN ALCOHOL/MO/W.PET/CERES
400 CREAM (GRAM) TOPICAL 2 TIMES DAILY
Status: DISCONTINUED | OUTPATIENT
Start: 2024-04-25 | End: 2024-04-26 | Stop reason: HOSPADM

## 2024-04-25 RX ADMIN — OXCARBAZEPINE 600 MG: 300 TABLET, FILM COATED ORAL at 20:15

## 2024-04-25 RX ADMIN — FOLIC ACID: 5 INJECTION, SOLUTION INTRAMUSCULAR; INTRAVENOUS; SUBCUTANEOUS at 08:37

## 2024-04-25 RX ADMIN — Medication 400 MG: at 08:57

## 2024-04-25 RX ADMIN — Medication 400 MG: at 17:34

## 2024-04-25 RX ADMIN — OXCARBAZEPINE 600 MG: 300 TABLET, FILM COATED ORAL at 08:37

## 2024-04-25 NOTE — DISCHARGE INSTR - OTHER ORDERS
CRISIS INFORMATION  Mental Health Matters!  Help is available. Please call.  Mental Health Crisis Intervention and Suicide Prevention Hot Line: Dial 988  Local Crisis number: 578-897-6612 or toll free: 1-985.293.2935 - 24-Hour Crisis & Emergency Services  Telephone Crisis Intervention is available 24 hours a day 7 days a week.  Mobile Crisis Intervention is available to be dispatched to the three counties during certain times and can be requested at the same numbers.  There is also a crisis residence available for those who need that level of care.  National Crisis Text Line:  754829    Drug and Alcohol Services  For information on how to access Drug and Alcohol treatment services please contact:  After hours 24/7 number:  [facility] at [phone]  During regular business hours call:  [facility] Office  [address] [place] [address]  Phone: [phone]    [facility] ([facility])  [address] [place] [address]  Phone: [phone] [place] [address] [address]  [place] [address]  New Admissions - [phone]  Local Office - [phone]   [facility] Office  [address]  [place] [address]  Phone [phone]      From the [facility] website [place].gov[place]guides/opioid-epidemic/#GetNaloxone    How do I get naloxone?  Family members and friends can access naloxone by:    Obtaining a prescription from their family doctor  Using the standing order issued by [facility] Physician General Sophia Hanson. A standing order is a prescription written for the general public, rather than specifically for an individual.  To use the standing order, print it and take it with you to the pharmacy or have the digital version on your phone. Download the standing order from the Department of Health (PDF).    If you are unable to  print it or use the digital version, the standing order is kept on file at many pharmacies. If a pharmacy does not have it on file, they may have the ability to look it up.    Naloxone prescriptions can be filled at most pharmacies. Although the medication might not be available for same-day pickup, it often can be ordered and available within a day or two.

## 2024-04-25 NOTE — PLAN OF CARE
Problem: SUBSTANCE USE/ABUSE  Goal: By discharge, will develop insight into their chemical dependency and sustain motivation to continue in recovery  Description: INTERVENTIONS:  - Attends all daily group sessions and scheduled AA groups  - Actively practices coping skills through participation in the therapeutic community and adherence to program rules  - Reviews and completes assignments from individual treatment plan  - Assist patient development of understanding of their personal cycle of addiction and relapse triggers  4/24/2024 2127 by Nichelle Sykes RN  Outcome: Progressing  4/24/2024 2126 by Ncihelle Sykes RN  Outcome: Progressing  Goal: By discharge, patient will have ongoing treatment plan addressing chemical dependency  Description: INTERVENTIONS:  - Assist patient with resources and/or appointments for ongoing recovery based living  4/24/2024 2127 by Nichelle Sykes RN  Outcome: Progressing  4/24/2024 2126 by Nichelle Sykes RN  Outcome: Progressing     Problem: DISCHARGE PLANNING  Goal: Discharge to home or other facility with appropriate resources  Description: INTERVENTIONS:  - Identify barriers to discharge w/patient and caregiver  - Arrange for needed discharge resources and transportation as appropriate  - Identify discharge learning needs (meds, wound care, etc.)  - Arrange for interpretive services to assist at discharge as needed  - Refer to Case Management Department for coordinating discharge planning if the patient needs post-hospital services based on physician/advanced practitioner order or complex needs related to functional status, cognitive ability, or social support system  Outcome: Progressing     Problem: Knowledge Deficit  Goal: Patient/family/caregiver demonstrates understanding of disease process, treatment plan, medications, and discharge instructions  Description: Complete learning assessment and assess knowledge base.  Interventions:  - Provide teaching at level of  understanding  - Provide teaching via preferred learning methods  Outcome: Progressing

## 2024-04-25 NOTE — CONSULTS
Psychiatric Evaluation - Department of Behavioral Health   Dayton Daley 54 y.o. male MRN: [id number]  Unit/Bed#: 5T [facility] 506-01 Encounter: [id number]    ASSESSMENT & PLAN     Suicide/Homicide Risk Assessment:  Risk of Harm to Self:  The following ratings are based on assessment at the time of the interview and review of records  Weapons: none. The following steps have been taken to ensure weapons are properly secured: not applicable  Based on today's assessment, Dayton presents the following risk of harm to self: low      Risk of Harm to Others:  The following ratings are based on assessment at the time of the interview and review of records  Weapons: none. The following steps have been taken to ensure weapons are properly secured: not applicable  Based on today's assessment, Dayton presents the following risk of harm to others: low    DSM-5 Diagnoses:   Substance Induced Mood and Anxiety Disorder, Alcohol Use Disorder, Major Depressive Disorder (per history),  ADHD (per history),     Treatment Recommendations/Precautions:  Admission labs evaluated; see below.  Continue medical management per detox primary team.   Recommend no changes to psychiatric medications at this time.   Continue:  Trileptal 600 mg BID for mood stabilization    Hydroxyzine 50 mg PRN every 6 hrs for anxiety  Trazodone 50 mg PRN at bedtime for insomnia.   Collaborate with collaterals for baseline assessment and disposition.  At this time, patient does not require 1 to 1 or virtual observation.  Continue vitals per detox unit protocol.  At this time, patient does not meet criteria for in patient psychiatric treatment.  Pt is interested in rehab facility and future outpatient therapy. Case management has been notified and will work with pt regarding recommendations.  Discharge date per primary team.     Medications Risks/Benefits:    No new medications were started.    Physician Requesting Consult: Shruthi Stack*  Reason for  "Consult / Principal Problem: Hx of depression    HISTORY OF PRESENT ILLNESS (HPI)     Dayton Daley is a 54 y.o., overtly appearing , pleasant male, possessing pertinent psychiatric history of major depressive disorder, and ADHD, medically complicated by substance use, presenting to [facility], subsequent to relapsed alcohol use and alcohol withdrawal.     Presently, patient is oriented, alert, and cooperative to exam. Patient arrived at [facility] after drinking heavily 4 days ago when his stepmother intervened to have him sent to the hospital. Patient was recently treated at [facility] and discharged within the last week. Patient said that this hospitalization was secondary to a desire to discover why he struggles with alcohol use. It was a voluntary admission. He was discharged on trileptal and hydroxyzine. Shortly after discharge he began to drink again. Patient's last drink was 2 days ago. Pt reports a 40 year hx of alcohol use, drinking 750 ml bottle of vodka daily on avg, and 20+ previous rehab encounters. Endorses over 20 year hx of depressed mood \"off and on.\" Rates current level of depression as 5/10. Patient reports the following depressive symptoms: feelings of guilt, inconsistent sleep, 5 lb weight gain in the past 2 wks. Patient does not believe that his is in a depressive episode currently. He believes that anxiety is his main issue at this time. Rates anxiety level of 7/10, d/t recent life stressors including possible impending move to [place] for work. Patient also mentioned that his recent DUI (10/24) that makes him worry for future incarceration. Pt shares past trauma related to previous suicide attempts made by his father. This trauma has lead to proir flashbacks and an avoidance of the gov't d/t these events. Pt has previous diagnosis of bipolar disorder (20 yrs ago), for which he was prescribed Lithium. Lithium was d/c d/t repeal of said " "diagnosis by his psychiatrist. Claims he has been told he has pressured speech at times, but is otherwise negative for hx of manic/hypomanic episode . Pt has no active/ passive SI or HI at this time.He endorses being able to care for self, and is compliant with his medication. Patient has goal-directed thinking toward the objective of alcohol cessation. He hopes to go to a [facility] based rehab facility and then eventually seek outpatient therapy.       Resident physician attempted to call patient's stepmother Dr. Karen Daley at 12:05 PM on 4/25/2024.  There was no answer and  a voicemail was left.  Resident physician called Dr. Daley again at around 1:30 PM.  Phone was answered by patient's father.  Patient's father gave a account of the events leading up to patient's admission.  According to father, patient was discharged from [facility] approximately 4 to 5 days ago. At that time he returned to parent's home. Patient remained sober for about 48 hours before sneaking out of house and starting to drink again.  To the best of father's knowledge, the patient continued to take his Trileptal following discharge from [facility].  Patient told his father that the Trileptal was helping him to feel \"less intense\".  Parents noticed that patient was less impulsive and less pressured as well.  Mother confirmed the patient is allowed to be discharged home while he waits for a bed at a rehab facility.  Father is aware of patient's desire to go to [facility] for rehab.      PSYCHIATRIC REVIEW OF SYSTEMS     Appetite: no change  Adverse eating: no  Weight changes: increased  Sleep deficit:  patient reports sleep has been fluctuating   Fatigue/anergy: no  Anhedonia/lack of interest: no  Attention/concentration: no  Psychomotor agitation/retardation: no  Somatic symptoms: no  Anxiety/panic attack: yes increased anxiety but not panic attacks   Clarissa/hypomania: no current symptoms, possible manic symptoms in the " past  Hopelessness/helplessness/worthlessness: no  Self-injurious behavior/high-risk behavior: no  Suicidal ideation: no  Homicidal ideation: no  Auditory hallucinations: no  Visual hallucinations: no  Other perceptual disturbances: no  Delusional thinking: no  Obsessive/compulsive symptoms: not accessed     REVIEW OF SYSTEMS   Pertinent items are noted in HPI. No complaints at this time.    HISTORICAL INFORMATION     Past Psychiatric History:   Past Psychiatric management: Diagnosed with bipolar disorder about 10 yrs ago. Dr. Pedraza later rescinded dx in outpatient care. Not currently following up with psych.  Past Suicide attempts/self-injurious behavior: N/A  Past Violent behavior: N/A  Past Psychiatric medications: Trazodone, Strattera, Abilify, Lithium, trileptal, hydroxyzine    Substance Abuse History:  I spent time with patient in counseling and education on risk of substance abuse. Assessed him motivation and encouraged patient for treatment. Brief intervention done.     I have assessed this patient for substance use within the past 12 months    Family Psychiatric History:   Suicide Attempts Father Illness: 3 times, 25 years ago  No known family hx of substance use disorders or psychiatric diagnoses    Social History:  Education: post college graduate work or degree ([employer])  Learning Disabilities: No  Marital history:   Living arrangement, social support: The patient lives in home with father.  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  Other Pertinent History: Legal: probation 4 DUI's (most recent Oct 24), public drunkenness, obstruction of justice, trespassing. No charges of violence.  Firearms: none    Traumatic History:   Abuse:  No  Other Traumatic Events: suicide attempts made by his father    OBJECTIVE     Past Medical History:   Diagnosis Date    Depression     ETOH abuse     Fatty liver     Hypertension        Meds/Allergies  "  current meds:   Current Facility-Administered Medications   Medication Dose Route Frequency    acetaminophen (TYLENOL) tablet 650 mg  650 mg Oral Q6H PRN    enoxaparin (LOVENOX) subcutaneous injection 40 mg  40 mg Subcutaneous Daily    [START ON 4/26/2024] folic acid (FOLVITE) tablet 1 mg  1 mg Oral Daily    hydrOXYzine HCL (ATARAX) tablet 50 mg  50 mg Oral Q6H PRN    magnesium Oxide (MAG-OX) tablet 400 mg  400 mg Oral BID    OXcarbazepine (TRILEPTAL) tablet 600 mg  600 mg Oral Q12H DOROTA    [START ON 4/26/2024] thiamine tablet 100 mg  100 mg Oral Daily    traZODone (DESYREL) tablet 50 mg  50 mg Oral HS PRN     No Known Allergies    Vital signs in last 24 hours:  Temp:  [97.3 °F (36.3 °C)-99.5 °F (37.5 °C)] 97.3 °F (36.3 °C)  HR:  [59-88] 62  Resp:  [16-18] 18  BP: (112-150)/(62-83) 124/62  No intake or output data in the 24 hours ending 04/25/24 0917    Screenings:  Nutrition Screening: Nutrition Assessment (completed by Staff): Nutrition  Feeding: Able to feed self  Diet Type: Regular/House  Appetite: Good    Pain Screening: Pain Screening: Pain Assessment  Pain Assessment Tool: 0-10  Pain Score: 0  Multiple Pain Sites: No    Suicide Screening: C-SSRS Screening (Nursing Assessment - recent):      Laboratory results:  I have personally reviewed all pertinent laboratory/tests results.    Mental Status Evaluation:  Appearance:  Male, alert, age appropriate, poor dentition, slightly disheveled hair but otherwise adequate grooming and hygiene   Behavior:  Cooperative, appropriate eye contact, lying bed   Speech:  normal pitch and normal volume, normal rate, spontaneous    Mood:  \"Good, but depressed with situation\"   Affect:  Constricted with moments of appropriate reactivity and slight brightness   Thought Process:  Goal directed, logical, linear, organized    Thought Content:  Some negative thinking, no overt delusions or paranoia    Perceptual Disturbances: Denies AVH, does not appear to be responding to internal " stimuli    Risk Potential: Suicidal Ideations none, Homicidal Ideations none, and Potential for Aggression No   Sensorium:  person, place, time/date, and situation   Cognition /memory:  short term memory mildly impaired per patient report    Consciousness:  alert and awake    Attention: attention span and concentration were age appropriate   Intellect: Normal    Fund of Knowledge: awareness of current events:     Insight:  Improving    Judgment: Improving    Muscle Strength and Tone: Not tested   Gait/Station: Not observed   Motor Activity: no abnormal movements         Code Status: Level 1 - Full Code  This note has been constructed using a voice recognition system.    There may be translation, syntax,  or grammatical errors. If you have any questions, please contact the dictating provider.    Letha Turcios, MS3

## 2024-04-25 NOTE — PROGRESS NOTES
Progress Note - Medical Toxicology    Dayton Daley 54 y.o. male MRN: [id number]  Unit/Bed#: 5T DETOX 506-01 Encounter: [id number]  MEDICAL DETOX UNIT, LEVEL 4  Department of Medical Toxicology  Reason for Admission/Principal Problem: alcohol withdrawal   Rounding Provider: Ilene Dominguez PA-C, Shruthi Stack*         * Alcohol withdrawal syndrome with complication (HCC)  Assessment & Plan  H/o chronic daily alcohol consumption, denies h/o withdrawal seizures  Last drink: 4/24/24   Ethanol lvl: 300   Continue SEWS protocol with symptom-triggered phenobarbital for medically-assisted alcohol withdrawal  Currently not endorsing withdrawal symptoms. Patient has not received any phenobarbital. Will continue to monitor on protocol until this afternoon.   Telemetry and pulse oximetry monitoring   Continue to monitor vitals, symptoms        Alcohol use disorder, severe, dependence (HCC)  Assessment & Plan  Pt with a h/o chronic heavy alcohol use for the past 38 years   Reports he was admitted to inpatient psych hospitalization last week. Discharged on Friday and then relapsed. Has been drinking 1/2 handle of vodka over the last 3 days.   Prior inpatient treatment hx, last inpatient rehab:   Previously tried naltrexone and vivitrol,  Will restart Naltrexone today   Withdrawal management as above  Discontinue IVFs  Continue thiamine, folic acid, and MVI  Consult case management/CRS for assistance with aftercare resources - pt interested in outpatient resources at this time     Hypomagnesemia  Assessment & Plan  Mag 1.8 on admission  K 3.5   PO magnesium oxide ordered   Continue monitoring and replete electrolytes as indicated     HTN (hypertension)  Assessment & Plan  Hx of hypertension  Not on any current medication  Continue to monitor BP   Will initiate anti-hypertensive if patient BP continues to be elevated despite adequate withdrawal management     Anxiety and depression  Assessment & Plan  Patient  endorses a h/o chronic anxiety and depression  Recent inpatient hospitalization at [place] for worsening depression   Home medications include: Trileptal 600 mg BID and Vistaril   Denies SI/HI/thoughts of self harm  Continue home medications  Inpatient consult to psychiatry; appreciate recommendations   Recommend OP f/u with PCP/OP Psychiatry             VTE Pharmacologic Prophylaxis:   Pharmacologic: Patient has refused VTE prophylaxis  Mechanical VTE Prophylaxis in Place: yes    Code Status: Level 1 - Full Code    Patient Centered Rounds: I have performed bedside rounds with nursing staff today.    Discussions with Specialists or Other Care Team Provider: Case Management    Education and Discussions with Family / Patient: I personally answered all of the patient's questions to the best of my ability     Time Spent for Care: 20 minutes.  More than 50% of total time spent on counseling and coordination of care as described above.    Current Length of Stay: 1 day(s)    Current Patient Status: Inpatient     Certification Statement: The patient will continue to require additional inpatient hospital stay due to on SEWS protocol  Discharge Plan: Anticipated Discharge within 24 hours         Subjective:   Patient seen and examined at bedside. Denies any withdrawal symptoms. He is interested in restarting Naltrexone.     Objective:     Clinical Opiate Withdrawal Scale  Pulse: 62    SEWS Total Score: 0 (4/25/2024  5:00 AM)        Last 24 Hours Medication List:   Current Facility-Administered Medications   Medication Dose Route Frequency Provider Last Rate    acetaminophen  650 mg Oral Q6H PRN Ilene Dominguez PA-C      enoxaparin  40 mg Subcutaneous Daily Ilene Dominguez PA-C      [START ON 4/26/2024] folic acid  1 mg Oral Daily Ilene Dominguez PA-C      hydrOXYzine HCL  50 mg Oral Q6H PRN Ilene Dominguez PA-C      magnesium Oxide  400 mg Oral BID Ilene Dominguez PA-C      OXcarbazepine   600 mg Oral Q12H [facility] Ileneyessica Dominguez PA-C      [START ON 2024] thiamine  100 mg Oral Daily Ileneyessica Dominguez PA-C      traZODone  50 mg Oral HS PRN Ilene Dominguez PA-C           Vitals:   Temp (24hrs), Av.2 °F (36.8 °C), Min:97.3 °F (36.3 °C), Max:99.5 °F (37.5 °C)    Temp:  [97.3 °F (36.3 °C)-99.5 °F (37.5 °C)] 97.3 °F (36.3 °C)  HR:  [59-88] 62  Resp:  [16-18] 18  BP: (112-150)/(62-83) 124/62  SpO2:  [94 %-99 %] 98 %  Body mass index is 26.61 kg/m².     Input and Output Summary (last 24 hours):No intake or output data in the 24 hours ending 24 0931    Physical Exam:   Physical Exam  Vitals and nursing note reviewed.   Constitutional:       General: He is not in acute distress.     Appearance: He is not diaphoretic.   Eyes:      General: No scleral icterus.     Pupils: Pupils are equal, round, and reactive to light.   Cardiovascular:      Rate and Rhythm: Normal rate and regular rhythm.      Heart sounds: No murmur heard.  Pulmonary:      Effort: No respiratory distress.      Breath sounds: Normal breath sounds.   Abdominal:      General: Bowel sounds are normal. There is no distension.      Palpations: Abdomen is soft.   Neurological:      Mental Status: He is alert and oriented to person, place, and time.      Motor: No tremor.   Psychiatric:         Mood and Affect: Mood is not anxious or depressed.         Additional Data:     Labs: keep all most recent labs as listed on admission templates   Results from last 7 days   Lab Units 24  0608 24  0716   WBC Thousand/uL 4.75 6.31   HEMOGLOBIN g/dL 14.2 15.4   HEMATOCRIT % 40.4 44.6   PLATELETS Thousands/uL 176 231   SEGS PCT %  --  41*   LYMPHO PCT %  --  39   MONO PCT %  --  18*   EOS PCT %  --  1      Results from last 7 days   Lab Units 24  0608   SODIUM mmol/L 140   POTASSIUM mmol/L 3.5   CHLORIDE mmol/L 108   CO2 mmol/L 25   BUN mg/dL 12   CREATININE mg/dL 0.90   ANION GAP mmol/L 7   CALCIUM mg/dL 8.2*    ALBUMIN g/dL 3.4*   TOTAL BILIRUBIN mg/dL 0.61   ALK PHOS U/L 45   ALT U/L 26   AST U/L 27   GLUCOSE RANDOM mg/dL 103      Results from last 7 days   Lab Units 04/24/24  0716   INR  0.89                         * I Have Reviewed All Lab Data Listed Above.  * Additional Pertinent Lab Tests Reviewed: All Labs For Current Hospital Admission Reviewed      Imaging Studies: I have personally reviewed pertinent reports.        Recent Cultures (last 7 days):          Today, Patient Was Seen By: Ilene Dominguez PA-C    ** Please Note: Dictation voice to text software may have been used in the creation of this document. **

## 2024-04-25 NOTE — UTILIZATION REVIEW
NOTIFICATION OF INPATIENT MEDICAL ADMISSION   AUTHORIZATION REQUEST   SERVICING FACILITY:   [address] [address]  Tax ID: [id number]  NPI: [id number] ATTENDING PROVIDER:  Attending Name and NPI#: Shruthi Peterson Md [[id number]]  Address: [address] [place] [address]  Phone: [phone]     ADMISSION INFORMATION:  Place of Service: Inpatient [facility]  Place of Service Code: 21  Inpatient Admission Date/Time: 4/24/24 12:46 PM  Discharge Date/Time: No discharge date for patient encounter.  Admitting Diagnosis Code/Description:  Alcohol withdrawal (HCC) [F10.939]     UTILIZATION REVIEW CONTACT:  Sharda Ceron, Utilization   Network Utilization Review Department  Phone: [phone]  Fax [phone]  Email: [email or web address]  Contact for approvals/pending authorizations, clinical reviews, and discharge.     PHYSICIAN ADVISORY SERVICES:  Medical Necessity Denial & Gldw-mx-Piqm Review  Phone: [phone]  Fax: [phone]  Email: [email or web address]     DISCHARGE SUPPORT TEAM:  For Patients Discharge Needs & Updates  Phone: [phone] opt. 2 Fax: [phone]  Email: [email or web address]

## 2024-04-25 NOTE — SOCIAL WORK
04/25/24 1410   Referral Data   Referral Source Patient   Referral Name Pt presented to [facility]   Referral Reason Drug/Alcohol Abuse   County Information   [place]idence [place]   Readmission Root Cause   30 Day Readmission No   Patient Information   Mental Status Alert   Primary Caregiver Self   Support System Immediate family   Pentecostal/Cultural Requests Jehovah's witness   Legal Information   Legal Issues Pt is on probation with [facility]   Activities of Daily Living Prior to Admission   Functional Status Independent   Assistive Device No device needed   Living Arrangement House;Lives with someone   Ambulation Independent   Access to Firearms   Access to Firearms No  (Pt denied)   Income Information   Income Source Employed   Means of Transportation   Means of Transport to Appts: Drives Self      04/25/24 1412   Substance Abuse Addendum Details   History of Withdrawal Symptoms Other withdrawal symptoms (specify in comment)  (anxiety)   Medical Complications HTN (hypertension)    Hypomagnesemia   Sober Supports bhupinder Daley (Father)  [phone]     amrita Thuy (Step Mom) [phone]   Present Treatment Pt denies current treatment   Substance Abuse Treatment Hx Past Tx, Inpatient;Past Tx, Outpatient;Past detox   ASAM Level & Criteria 3.5 LOC   Additional Comments Pt reported he wants to go to [facility] 3.5 LOC   Stage of Change   Stage of Change Precontemplation   Additional Substance Use Detail    Questions Responses   Problems Due to Past Use of Alcohol? Yes   Problems Due to Past Use of Substances? No   Substance Use Assessment Substance use within the past 12 months   Alcohol Use Frequency Daily   Alcohol Drink of Choice vodka   1st Use of Alcohol 14   Last Use of Alcohol & Amount 4/24/24- 1/2 handle of vodka   Longest Abstinence from Alcohol 8 months     Pt is a 54 year old male who was admitted to withdrawal management unit for alcohol withdrawal. Pt had presented  "at [facility]. Pt's name, date of birth, home address and telephone number were verified. Pt was informed of case management role and the purpose of the completion of intake with case management. Pt presented as cooperative during assessment.     SUBSTANCE ABUSE    Stressor/Trigger    Alcohol Withdrawal presented at [facility]   UDS: Not Completed   Audit: Not Completed   PAWSS: 5 Nicotine: Pt denied nicotine and tobacco use   Ethanol: 300   Prior D&A treatment   Pt reported Hx of IP FRANKIE treatment , Pt reported going to [facility] about 1 year ago.    Pt reported Hx of IOP for FRANKIE treatment    AA/NA Meetings   Pt reported Hx of AA in the community    Withdrawal  Symptoms   Anxiety    History of OD/blackouts or Withdrawal Seizures   Pt denied Hx of withdrawal seizures    MENTAL HEALTH INFORMATION    Hx of Mental Health Dx   Anxiety and Depression    Outpatient Mental Health Tx   Pt reported Hx of mental health OP Tx   Inpatient Hospitalizations (Mental Health)   Pt was recently hospitalized at [facility] last week due to increasing depression. He states that he was discharged a \" few days ago\" and relapsed.        Family History of Mental Health    Pt reported denied family Hx of mental health Dx.    Pt denied family Hx AUD/FRANKIE  Sister is in recovery 13 years.    Trauma History    Pt denied Hx of trauma    Current MH Symptoms   Pt denied SI/HI/AVH   Access to Firearms    Pt denied access to firearms    PHYSICAL HEALTH INFORMATION    Physical Health Conditions (Chronic)   HTN (hypertension)       Hypomagnesemia       PCP   Yadiel Lu MD  [phone]    [facility]      Preferred Pharmacy   [id number] [place] - [address] pharmacy    LEGAL ISSUES    Past or present legal issues   Pt reported Hx of DUI   Probation/[place]   Pt on probation for DUI [place]    EMPLOYMENT/INCOME/NEEDS    Education   Graduate school    Employment " [employer] , part-time. .       History   Pt denied    Spiritual/Sikhism Beliefs   Jewish    Transportation/Transport Home   Pt drives self    DEMOGRAPHICS    Discharge Address   [address]   [facility] [address]    Living Arrangement   Live with Father   Can pt return home Yes but Pt must go back to IP treatment      External Supports     marie Daley (Father)   [phone] (M     amrita Daley (Step Mom)  [phone] (M)      Phone Number   [phone]    Email   [email or web address]        Marital Status/Children   , 2 sons (26,28)     Substance First use Last Use Frequency Amount Used How long Longest period of sobriety and when Method of use   THC            Heroin            Cocaine            ETOH   14 4/24/24 Daily  1/2 handle daily  vodka 38 years/ recent relapse of drinking was 2 days   8 months Drink    Meth            Benzos            Other:              Pt completed and signed the relapse prevention plan. Pt reported he wants to go to [facility]. Cm communicated with the rehab and they reported they do not accept his insurance. Pt is [facility] still and did not switch his insurance to [facility]. Pt reported he did switch it two days ago and submitted all necessary documents. Pt call [facility] asking for permission to be funded at [email or web address] through [facility]. Pt signed JESUS's for [facility] [employer], [facility], and [facility]. Pt is very confident he only wants to go to [facility]. Pt's goals for detox are to successfully complete medical withdrawal and to develop a discharge plan that includes relapse prevention education. Pt presents in the pre-contemplation stage of change.     Cm asked [facility] to review Pt for possible intake, Pt was denied due to insurance.

## 2024-04-25 NOTE — QUICK NOTE
"RESIDENT ATTESTATION: I have reviewed all required components of this note as performed and documented by Dr. Ornelas and student doctor Letha Turcios. I personally interviewed and performed all the required portions of the psychiatric evaluation in addition to examining the patient on 04/25/24. I discussed the case with Dr. Ornelas and student doctor Letha Turcios and reviewed their note, prescribed medications and placed orders if applicable. I directly supervised Dr. Ornelas and student doctor Letha Turcios and was available for discussion. I agree with Dr. Ornelas and student doctor Letha Turcios's plan as it was presented to me. I attest that this information is true, accurate and comprehensive to the best of my knowledge with exception(s):     Dayton Daley is a 54 y.o., overtly appearing  male, possessing pertinent psychiatric history of major depressive disorder versus bipolar affective disorder, attention deficit hyperactivity disorder and alcohol use disorder, presenting to [address] for detoxification, subsequent to increasingly pervasive and prevalent alcohol use including intake of approximately one \"bottle\" of liquor per day accompanied by worsening dysphoric mood including depression, depressive signs/symptoms (see below) and anxiety in the setting of worsening psychosocial stressors. Per record review, patient was previously admitted to inpatient behavioral health through [facility], subsequently stabilized on Trileptal and Vistaril. Presently, patient adamantly denies suicidal/homicidal ideation in addition to thoughts of self-injury, carly for safety, receptive to crisis planning provided by this writer, stating that he is \"better\", admitting to significantly less mood dysphoria including depression and anxiety, denying any additional psychiatric complaints/concerns.     Suicide/Homicide Risk Assessment:  Risk of Harm to Self:  The " following ratings are based on assessment at the time of the interview and review of records  Based on today's assessment, Dayton presents the following risk of harm to self: low    Risk of Harm to Others:  The following ratings are based on assessment at the time of the interview and review of records  Based on today's assessment, Dayton presents the following risk of harm to others: low    DSM-5 Diagnoses:   Mood disorder, unspecified; consideration for major depressive disorder, recurrent versus bipolar affective disorder versus substance induced mood disorder  Alcohol use disorder, severe, dependence    Treatment Recommendations/Precautions:  Continue medical management per primary team.  Collaborate with collaterals for baseline assessment and disposition.  Continue Trileptal 600 mg every 12 hours scheduled to confer mood stability.   Presently, patient does not adhere to criteria for inpatient behavioral health admission, anticipating involvement in inpatient versus outpatient rehabilitation to adequately address his alcohol abuse.  Discharge date per primary team.   Consultation appreciated. Psychiatry to sign off. Please do not hesitate to call/contact our service with any additional comments/concerns. Thank you!    Vital Signs in the Past 24 hours:    Temp:  [97.3 °F (36.3 °C)-99.5 °F (37.5 °C)] 97.3 °F (36.3 °C)  HR:  [59-88] 62  Resp:  [16-18] 18  BP: (112-150)/(62-83) 124/62    Laboratory Results: I have personally reviewed all pertinent laboratory/tests results    Results from the past 24 hours:   Recent Results (from the past 24 hour(s))   Comprehensive metabolic panel    Collection Time: 04/25/24  6:08 AM   Result Value Ref Range    Sodium 140 135 - 147 mmol/L    Potassium 3.5 3.5 - 5.3 mmol/L    Chloride 108 96 - 108 mmol/L    CO2 25 21 - 32 mmol/L    ANION GAP 7 4 - 13 mmol/L    BUN 12 5 - 25 mg/dL    Creatinine 0.90 0.60 - 1.30 mg/dL    Glucose 103 65 - 140 mg/dL    Calcium 8.2 (L) 8.4 - 10.2 mg/dL     Corrected Calcium 8.7 8.3 - 10.1 mg/dL    AST 27 13 - 39 U/L    ALT 26 7 - 52 U/L    Alkaline Phosphatase 45 34 - 104 U/L    Total Protein 5.7 (L) 6.4 - 8.4 g/dL    Albumin 3.4 (L) 3.5 - 5.0 g/dL    Total Bilirubin 0.61 0.20 - 1.00 mg/dL    eGFR 95 ml/min/1.73sq m   CBC and Platelet    Collection Time: 04/25/24  6:08 AM   Result Value Ref Range    WBC 4.75 4.31 - 10.16 Thousand/uL    RBC 4.28 3.88 - 5.62 Million/uL    Hemoglobin 14.2 12.0 - 17.0 g/dL    Hematocrit 40.4 36.5 - 49.3 %    MCV 94 82 - 98 fL    MCH 33.2 26.8 - 34.3 pg    MCHC 35.1 31.4 - 37.4 g/dL    RDW 14.5 11.6 - 15.1 %    Platelets 176 149 - 390 Thousands/uL    MPV 10.3 8.9 - 12.7 fL   Magnesium    Collection Time: 04/25/24  6:08 AM   Result Value Ref Range    Magnesium 1.8 (L) 1.9 - 2.7 mg/dL     Most Recent Labs:   Lab Results   Component Value Date    WBC 4.75 04/25/2024    RBC 4.28 04/25/2024    HGB 14.2 04/25/2024    HCT 40.4 04/25/2024     04/25/2024    RDW 14.5 04/25/2024    NEUTROABS 2.61 04/24/2024    SODIUM 140 04/25/2024    K 3.5 04/25/2024     04/25/2024    CO2 25 04/25/2024    BUN 12 04/25/2024    CREATININE 0.90 04/25/2024    GLUC 103 04/25/2024    CALCIUM 8.2 (L) 04/25/2024    AST 27 04/25/2024    ALT 26 04/25/2024    ALKPHOS 45 04/25/2024    TP 5.7 (L) 04/25/2024    ALB 3.4 (L) 04/25/2024    TBILI 0.61 04/25/2024    [id number] 0.633 08/30/2021    HGBA1C 5.9 (H) 07/22/2021     07/22/2021     Labs in last 72 hours:   Recent Labs     04/24/24  0716 04/24/24  0723 04/25/24  0608   WBC 6.31  --  4.75   RBC 4.80  --  4.28   HGB 15.4  --  14.2   HCT 44.6  --  40.4     --  176   RDW 14.4  --  14.5   NEUTROABS 2.61  --   --    SODIUM  --    < > 140   K  --    < > 3.5   CL  --    < > 108   CO2  --    < > 25   BUN  --    < > 12   CREATININE  --    < > 0.90   GLUC  --    < > 103   CALCIUM  --    < > 8.2*   AST  --    < > 27   ALT  --    < > 26   ALKPHOS  --    < > 45   TP  --    < > 5.7*   ALB  --    < > 3.4*    TBILI  --    < > 0.61    < > = values in this interval not displayed.     Admission Labs:   Admission on 04/24/2024   Component Date Value    Sodium 04/25/2024 140     Potassium 04/25/2024 3.5     Chloride 04/25/2024 108     CO2 04/25/2024 25     ANION GAP 04/25/2024 7     BUN 04/25/2024 12     Creatinine 04/25/2024 0.90     Glucose 04/25/2024 103     Calcium 04/25/2024 8.2 (L)     Corrected Calcium 04/25/2024 8.7     AST 04/25/2024 27     ALT 04/25/2024 26     Alkaline Phosphatase 04/25/2024 45     Total Protein 04/25/2024 5.7 (L)     Albumin 04/25/2024 3.4 (L)     Total Bilirubin 04/25/2024 0.61     eGFR 04/25/2024 95     WBC 04/25/2024 4.75     RBC 04/25/2024 4.28     Hemoglobin 04/25/2024 14.2     Hematocrit 04/25/2024 40.4     MCV 04/25/2024 94     MCH 04/25/2024 33.2     MCHC 04/25/2024 35.1     RDW 04/25/2024 14.5     Platelets 04/25/2024 176     MPV 04/25/2024 10.3     Magnesium 04/25/2024 1.8 (L)        Screenings:    Nutrition Screening: Nutrition Assessment (completed by Staff): Nutrition  Feeding: Able to feed self  Diet Type: Regular/House  Appetite: Good    Pain Screening: Pain Screening: Pain Assessment  Pain Assessment Tool: 0-10  Pain Score: 0  Multiple Pain Sites: No    Suicide Screening: C-SSRS Screening (Nursing Assessment - since last contact):        Code Status: Level 1 - Full Code    Advance Directive and Living Will:        Power of :      POLST:      This note has been constructed using a voice recognition system.    There may be translation, syntax,  or grammatical errors. If you have any questions, please contact the dictating provider.    Jordan Christopher Holter,  04/26/24

## 2024-04-25 NOTE — UTILIZATION REVIEW
Initial Clinical Review    Pt initially presented to [facility] ED. Pt was transferred by EMS to [facility] for its Level IV medically managed intensive inpatient detox unit, not available at [facility].    Admission: Date/Time/Statement:   Admission Orders (From admission, onward)       Ordered        04/24/24 1318  Inpatient Admission  Once                          Orders Placed This Encounter   Procedures    Inpatient Admission     Standing Status:   Standing     Number of Occurrences:   1     Order Specific Question:   Level of Care     Answer:   Med Surg [16]     Order Specific Question:   Estimated length of stay     Answer:   More than 2 Midnights     Order Specific Question:   Certification     Answer:   I certify that inpatient services are medically necessary for this patient for a duration of greater than two midnights. See H&P and MD Progress Notes for additional information about the patient's course of treatment.     ED Arrival Information       Patient not seen in ED                       No chief complaint on file.      Initial Presentation: 54 y.o. male who presented to medical detox. Inpatient admission for evaluation and treatment of alcohol withdrawal syndrome. Presented w/ need for detox from alcohol. Serum ETOH: 300. Reports 1/2 handle of vodka over the last 3 days, last drink on 04/24.Has prior rehab treatment for withdrawal. Reports no hx of withdrawal seizures. On exam, anxiety noted. SEWS 0. Plan: SEWS monitoring w/ phenobarbital management, IV thiamine/folic acid supplement, IVF, telemetry, continuous pulse ox, continue PTA meds, trend labs, replete electrolytes as needed.       Date: 04/25       Day 2: Pt denies any w/drawal symptoms. On exam, aaox3. SEWS 0. Plan: continue SEWS monitoring w/ phenobarbital management, PO thiamine/folic acid supplement, telemetry, continuous pulse ox, continue current meds, trend labs, replete electrolytes as needed.     ED  Triage Vitals   Temperature Pulse Respirations Blood Pressure SpO2   04/24/24 1252 04/24/24 1252 04/24/24 1252 04/24/24 1252 04/24/24 1252   98.1 °F (36.7 °C) 76 18 129/79 99 %      Temp Source Heart Rate Source Patient Position - Orthostatic VS BP Location FiO2 (%)   04/24/24 1252 04/24/24 1252 04/24/24 1252 04/24/24 1252 --   Temporal Monitor Lying Right arm       Pain Score       04/24/24 1306       No Pain          Wt Readings from Last 1 Encounters:   04/24/24 79.4 kg (175 lb)     Vital Signs:   Date/Time Temp Pulse Resp BP MAP (mmHg) SpO2 O2 Device Patient Position - Orthostatic VS   04/25/24 1112 97.7 °F (36.5 °C) 80 18 143/83 -- 99 % None (Room air) Lying   04/25/24 0844 97.3 °F (36.3 °C) Abnormal  62 18 124/62 -- 98 % None (Room air) Lying   04/25/24 0603 97.7 °F (36.5 °C) 59 16 112/66 81 97 % None (Room air) Lying   04/25/24 0006 98.5 °F (36.9 °C) 88 16 144/83 103 96 % None (Room air) Sitting   04/24/24 2023 99.5 °F (37.5 °C) 84 16 150/72 98 94 % None (Room air) Sitting   04/24/24 1514 97.8 °F (36.6 °C) 72 18 121/71 87 98 % None (Room air) Lying     Severity of Ethanol Withdrawal Scale (SEWS):       Pertinent Labs/Diagnostic Test Results:   No orders to display         Results from last 7 days   Lab Units 04/25/24  0608 04/24/24  0716   WBC Thousand/uL 4.75 6.31   HEMOGLOBIN g/dL 14.2 15.4   HEMATOCRIT % 40.4 44.6   PLATELETS Thousands/uL 176 231   TOTAL NEUT ABS Thousands/µL  --  2.61         Results from last 7 days   Lab Units 04/25/24  0608 04/24/24  0723   SODIUM mmol/L 140 144   POTASSIUM mmol/L 3.5 3.6   CHLORIDE mmol/L 108 107   CO2 mmol/L 25 28   ANION GAP mmol/L 7 9   BUN mg/dL 12 9   CREATININE mg/dL 0.90 1.02   EGFR ml/min/1.73sq m 95 81   CALCIUM mg/dL 8.2* 8.8   MAGNESIUM mg/dL 1.8*  --      Results from last 7 days   Lab Units 04/25/24  0608 04/24/24  0723   AST U/L 27 36   ALT U/L 26 31   ALK PHOS U/L 45 63   TOTAL PROTEIN g/dL 5.7* 6.7   ALBUMIN g/dL 3.4* 4.2   TOTAL BILIRUBIN mg/dL 0.61  0.27         Results from last 7 days   Lab Units 04/25/24  0608 04/24/24  0723   GLUCOSE RANDOM mg/dL 103 142*         Results from last 7 days   Lab Units 04/24/24  0914 04/24/24  0716   HS TNI 0HR ng/L  --  5   HS TNI 2HR ng/L 5  --    HSTNI D2 ng/L 0  --          Results from last 7 days   Lab Units 04/24/24  0716   PROTIME seconds 12.6   INR  0.89                 Results from last 7 days   Lab Units 04/24/24  0723 04/24/24  0716   ETHANOL LVL mg/dL  --  300*   ACETAMINOPHEN LVL ug/mL <2*  --    SALICYLATE LVL mg/dL <5  --            ED Treatment:   Medication Administration - No Administrations Displayed (No Start Event Found)       None          Past Medical History:   Diagnosis Date    Depression     ETOH abuse     Fatty liver     Hypertension      Present on Admission:   Alcohol use disorder, severe, dependence (HCC)      Admitting Diagnosis: Alcohol withdrawal (HCC) [F10.939]  Age/Sex: 54 y.o. male  Admission Orders:  Regular Diet. SCDs.  Fall & Seizure Precautions.  SEWS monitoring.  Telemetry & Continuous Pulse Ox.    Scheduled Medications:  enoxaparin, 40 mg, Subcutaneous, Daily  [START ON 4/26/2024] folic acid, 1 mg, Oral, Daily  magnesium Oxide, 400 mg, Oral, BID  OXcarbazepine, 600 mg, Oral, Q12H [place]  [START ON 4/26/2024] thiamine, 100 mg, Oral, Daily      Continuous IV Infusions:  sodium chloride 0.9 % infusion  Rate: 100 mL/hr Dose: 100 mL/hr  Freq: Continuous Route: IV  Indications of Use: IV Hydration  Last Dose: 100 mL/hr (04/24/24 1349)  Start: 04/24/24 1330 End: 04/25/24 0914      PRN Meds:  acetaminophen, 650 mg, Oral, Q6H PRN  hydrOXYzine HCL, 50 mg, Oral, Q6H PRN 04/24 x 1  traZODone, 50 mg, Oral, HS PRN 04/24 x 1        IP CONSULT TO CASE MANAGEMENT  IP CONSULT TO PSYCHIATRY  CONSULT TO CERTIFIED     Network Utilization Review Department  ATTENTION: Please call with any questions or concerns to [phone] and carefully listen to the prompts so that you are directed to  the right person. All voicemails are confidential.   For Discharge needs, contact Care Management DC Support Team at [phone] opt. 2  Send all requests for admission clinical reviews, approved or denied determinations and any other requests to dedicated fax number below belonging to the campus where the patient is receiving treatment. List of dedicated fax numbers for the Facilities:  FACILITY NAME UR FAX NUMBER   ADMISSION DENIALS (Administrative/Medical Necessity) [phone]   DISCHARGE SUPPORT TEAM (NETWORK) [phone]   PARENT CHILD HEALTH (Maternity/NICU/Pediatrics) [phone]   [facility] [phone]   [facility] [phone]   [facility] [phone]   [facility] [phone]   [facility] [phone]   [facility] [phone]   [facility] [phone]   [facility] [phone]   [facility] [phone]   [facility] [phone]   [facility] [phone]   [facility] [phone]

## 2024-04-26 VITALS
RESPIRATION RATE: 18 BRPM | HEIGHT: 68 IN | BODY MASS INDEX: 26.52 KG/M2 | WEIGHT: 175 LBS | SYSTOLIC BLOOD PRESSURE: 153 MMHG | TEMPERATURE: 97 F | DIASTOLIC BLOOD PRESSURE: 78 MMHG | OXYGEN SATURATION: 98 % | HEART RATE: 60 BPM

## 2024-04-26 PROCEDURE — 99239 HOSP IP/OBS DSCHRG MGMT >30: CPT | Performed by: EMERGENCY MEDICINE

## 2024-04-26 RX ORDER — LANOLIN ALCOHOL/MO/W.PET/CERES
100 CREAM (GRAM) TOPICAL DAILY
Qty: 30 TABLET | Refills: 0 | Status: SHIPPED | OUTPATIENT
Start: 2024-04-27

## 2024-04-26 RX ORDER — OXCARBAZEPINE 600 MG/1
600 TABLET, FILM COATED ORAL EVERY 12 HOURS SCHEDULED
Qty: 60 TABLET | Refills: 0 | Status: SHIPPED | OUTPATIENT
Start: 2024-04-26

## 2024-04-26 RX ADMIN — FOLIC ACID 1 MG: 1 TABLET ORAL at 08:05

## 2024-04-26 RX ADMIN — NALTREXONE 380 MG: KIT at 11:19

## 2024-04-26 RX ADMIN — THIAMINE HCL TAB 100 MG 100 MG: 100 TAB at 08:05

## 2024-04-26 RX ADMIN — Medication 400 MG: at 08:05

## 2024-04-26 RX ADMIN — OXCARBAZEPINE 600 MG: 300 TABLET, FILM COATED ORAL at 08:05

## 2024-04-26 NOTE — DISCHARGE SUMMARY
MEDICAL DETOX UNIT, LEVEL 4  Department of Medical Toxicology  Reason for Admission/Principal Problem: Alcohol use disorder, alcohol withdrawal syndrome   Admitting provider: MIGUEL Juarez*   4/24/2024 12:46 PM       Discharging Physician / Practitioner: Nury Cuadra PA-C  PCP: Yadiel uL MD  Admission Date:   Admission Orders (From admission, onward)       Ordered        04/24/24 1318  Inpatient Admission  Once                          Discharge Date: 4/26/24    Medical Problems       Resolved Problems  Date Reviewed: 4/24/2024            Resolved    * (Principal) Alcohol withdrawal syndrome with complication (HCC) 4/25/2024     Resolved by  Nury Cuadra PA-C          Alcohol use disorder, severe, dependence (HCC)  Assessment & Plan  Pt with a h/o chronic heavy alcohol use for the past 38 years   Reports he was admitted to inpatient psych hospitalization last week. Discharged on Friday and then relapsed. Has been drinking 1/2 handle of vodka over the last 3 days.   Prior inpatient treatment hx  Withdrawal management as above  Discontinue IVFs  Continue thiamine, folic acid, and MVI  Consult case management/CRS for assistance with aftercare resources     * Alcohol withdrawal syndrome with complication (HCC)-resolved as of 4/25/2024  Assessment & Plan  H/o chronic daily alcohol consumption, denies h/o withdrawal seizures  Last drink: 4/24/24   Ethanol lvl: 300   Discontinue SEWS protocol as patient has not endorsed withdrawal symptoms, has not required any phenobarbital during his admission  Telemetry and pulse oximetry monitoring   Continue to monitor vitals, symptoms  Likely discharge on 4/26 with outpatient follow up         Hypomagnesemia  Assessment & Plan  Mag 1.8 on admission  K 3.5   PO magnesium oxide ordered   Continue monitoring and replete electrolytes as indicated     HTN (hypertension)  Assessment & Plan  Hx of hypertension  Not on any  "current medication  Continue to monitor BP   Will initiate anti-hypertensive if patient BP continues to be elevated despite adequate withdrawal management     Anxiety and depression  Assessment & Plan  Patient endorses a h/o chronic anxiety and depression  Recent inpatient hospitalization at [facility] for worsening depression   Home medications include: Trileptal 600 mg BID and Vistaril   Denies SI/HI/thoughts of self harm  Continue home medications  Inpatient consult to psychiatry; appreciate recommendations   Recommend OP f/u with PCP/OP Psychiatry         Consultations During Hospital Stay:  Case management  Psychiatry    Procedures Performed:   None    Significant Findings / Test Results:   Hypomagnesemia  Hypokalemia     Incidental Findings:   N/A    Test Results Pending at Discharge (will require follow up):   N/A     Outpatient Tests Requested:  Follow up with PCP and Psychiatry as outpatient     Complications:  None    Reason for Admission: Alcohol use disorder, alcohol withdrawal syndrome     Hospital Course:     Dayton Daley is a 54 y.o. male patient who originally presented to the hospital on 4/24/2024 due to alcohol withdrawal seeking detoxification. Patient initially presented [facility] ED requesting alcohol detox with acute intoxication and was subsequently transferred to the [facility] medical detox unit for medical management of alcohol withdrawal. Pt reports drinking daily for the past 38 years. Patient reports that he was recently hospitalized at [facility] last week due to increasing depression. He states that he was discharged a \" few days ago\" and relapsed. Over the last two days he states drank 1/2 handle of vodka. During recent inpatient psych admission patient started on Trileptal and Vistaril. He was admitted to [facility] detox unit and managed under SEWS protocol, did not require any phenobarbital as he did not endorse any signs/sx of withdrawal. SEWS was discontinued on 4/24. " "He was also evaluated by Psych during his admission and was cleared for discharge. He remained stable for discharge home on 4/26/24 with family into a sober environment with plans to enter into [facility] after discharge. There was some confusion as to whether [facility] would accept him. Therefore, our [facility] also set him up with other outpatient follow up resources. He was given Vivitrol prior to discharge.            Please see above list of diagnoses and related plan for additional information.     Condition at Discharge: good     Discharge Day Visit / Exam:     Subjective:  Feels well. No complaints  Vitals: Blood Pressure: 131/80 (04/25/24 1950)  Pulse: 65 (04/25/24 1950)  Temperature: (!) 97.4 °F (36.3 °C) (04/25/24 1950)  Temp Source: Temporal (04/25/24 1950)  Respirations: 18 (04/25/24 1950)  Height: 5' 8\" (172.7 cm) (04/24/24 1252)  Weight - Scale: 79.4 kg (175 lb) (04/24/24 1252)  SpO2: 97 % (04/25/24 1950)  Exam:   Physical Exam  Constitutional:       General: He is not in acute distress.     Appearance: Normal appearance.   Cardiovascular:      Rate and Rhythm: Regular rhythm.   Pulmonary:      Effort: Pulmonary effort is normal.   Neurological:      Mental Status: He is alert and oriented to person, place, and time.      Coordination: Coordination normal.      Gait: Gait normal.   Psychiatric:         Mood and Affect: Mood normal.         Behavior: Behavior normal.         Discussion with Family: d/w patient    Discharge instructions/Information to patient and family:   See after visit summary for information provided to patient and family.      Provisions for Follow-Up Care:  See after visit summary for information related to follow-up care and any pertinent home health orders.      Disposition:     Home    For Discharges to [facility] SNF:   Not Applicable to this Patient - Not Applicable to this Patient    Planned Readmission: No     Discharge Statement:  I spent 38 minutes discharging the " patient. This time was spent on the day of discharge. I had direct contact with the patient on the day of discharge. Greater than 50% of the total time was spent examining patient, answering all patient questions, arranging and discussing plan of care with patient as well as directly providing post-discharge instructions.  Additional time then spent on discharge activities.    Discharge Medications:  See after visit summary for reconciled discharge medications provided to patient and family.      ** Please Note: This note has been constructed using a voice recognition system **

## 2024-04-26 NOTE — PROGRESS NOTES
CM meets with Pt to discuss pending discharge.  Pt explains that he continues to have interest in being admitted to [facility].  CM explained that he has the option of being reviewed with other programs for direct admission since [facility] has not accepted.  Pt declines stating that he prefers to wait until [facility] can accept him.  Pt verbalizes agreement with attending OP FRANKIE and mental health Tx with [facility] in [place].  JESUS signed.  Intake scheduled for 5/2/2024, @ 2pm.

## 2024-04-26 NOTE — ASSESSMENT & PLAN NOTE
Patient endorses a h/o chronic anxiety and depression  Recent inpatient hospitalization at [facility] for worsening depression   Home medications include: Trileptal 600 mg BID and Vistaril   Denies SI/HI/thoughts of self harm  Continue home medications  Inpatient consult to psychiatry; appreciate recommendations   Recommend OP f/u with PCP/OP Psychiatry

## 2024-04-26 NOTE — ASSESSMENT & PLAN NOTE
H/o chronic daily alcohol consumption, denies h/o withdrawal seizures  Last drink: 4/24/24   Ethanol lvl: 300   Discontinue SEWS protocol as patient has not endorsed withdrawal symptoms, has not required any phenobarbital during his admission  Telemetry and pulse oximetry monitoring   Continue to monitor vitals, symptoms  Likely discharge on 4/26 with outpatient follow up

## 2024-04-26 NOTE — PROGRESS NOTES
04/26/24 1256   Discharge Planning   Living Arrangements Lives w/ Family members   Support Systems Self;Family members   Assistance Needed Pt independent in ADL's.   Type of Current Residence Private residence   Current Home Care Services No   Other Referral/Resources/Interventions Provided:   Referrals Provided: Other (Specify);Crisis Hotline  (Pt scheduled for intake with [facility] for OP FRANKIE Tx.)   Government Services: Medical Assistance  (Pt encouraged to continue to f/u with his insurance to make sure that his cover county is updated from [place] to [place].)   Discharge Communications   Discharge planning discussed with: Pt   Freedom of Choice Yes   Transportation at Discharge? No  (Transport home provided by Pt's parents.)   Dispatcher Contacted No   Transfer Mode Other (Comment)  (Parents picked Pt up from [facility] of [facility] and provided transport home.)   Contacts   Relationship to Patient: Family   Contact Method Phone   Phone Number [phone] (H)   Reason/Outcome Discharge Planning   [facility]   Would you like to participate in our [facility] service program?   No - Declined       CM notified that Pt is clinically stable for discharge to the community.  Pt denies withdrawal symptoms.

## 2024-04-26 NOTE — PLAN OF CARE
Problem: SUBSTANCE USE/ABUSE  Goal: By discharge, will develop insight into their chemical dependency and sustain motivation to continue in recovery  Description: INTERVENTIONS:  - Attends all daily group sessions and scheduled AA groups  - Actively practices coping skills through participation in the therapeutic community and adherence to program rules  - Reviews and completes assignments from individual treatment plan  - Assist patient development of understanding of their personal cycle of addiction and relapse triggers  Outcome: Completed  Goal: By discharge, patient will have ongoing treatment plan addressing chemical dependency  Description: INTERVENTIONS:  - Assist patient with resources and/or appointments for ongoing recovery based living  Outcome: Completed

## 2024-04-26 NOTE — ASSESSMENT & PLAN NOTE
Pt with a h/o chronic heavy alcohol use for the past 38 years   Reports he was admitted to inpatient psych hospitalization last week. Discharged on Friday and then relapsed. Has been drinking 1/2 handle of vodka over the last 3 days.   Prior inpatient treatment hx  Withdrawal management as above  Discontinue IVFs  Continue thiamine, folic acid, and MVI  Consult case management/CRS for assistance with aftercare resources

## 2024-04-26 NOTE — ASSESSMENT & PLAN NOTE
Mag 1.8 on admission  K 3.5   PO magnesium oxide ordered   Continue monitoring and replete electrolytes as indicated

## 2024-04-29 NOTE — UTILIZATION REVIEW
NOTIFICATION OF ADMISSION DISCHARGE   This is a Notification of Discharge from [facility]. Please be advised that this patient has been discharge from our facility. Below you will find the admission and discharge date and time including the patient’s disposition.   UTILIZATION REVIEW CONTACT:  Sharda Ceron MA  Utilization   Network Utilization Review Department  Phone: [phone] x carefully listen to the prompts. All voicemails are confidential.  Email: [email or web address]     ADMISSION INFORMATION  PRESENTATION DATE: 4/24/2024 12:46 PM  OBERVATION ADMISSION DATE:   INPATIENT ADMISSION DATE: 4/24/24 12:46 PM   DISCHARGE DATE: 4/26/2024  1:02 PM   DISPOSITION:Home/Self Care    Network Utilization Review Department  ATTENTION: Please call with any questions or concerns to [phone] and carefully listen to the prompts so that you are directed to the right person. All voicemails are confidential.   For Discharge needs, contact Care Management DC Support Team at [phone] opt. 2  Send all requests for admission clinical reviews, approved or denied determinations and any other requests to dedicated fax number below belonging to the campus where the patient is receiving treatment. List of dedicated fax numbers for the Facilities:  FACILITY NAME UR FAX NUMBER   ADMISSION DENIALS (Administrative/Medical Necessity) [phone]   DISCHARGE SUPPORT TEAM ([facility]) [phone]   PARENT CHILD HEALTH (Maternity/NICU/Pediatrics) [phone]   [facility] [phone]   [facility] [phone]   Critical access hospital [phone]   [facility] [phone]   [facility] [phone]   [facility] [phone]   [facility] [phone]   [facility]  [phone]   [facility] [phone]   [facility] [phone]   [facility] [phone]   [facility] [phone]

## 2024-05-27 ENCOUNTER — HOSPITAL ENCOUNTER (INPATIENT)
Facility: HOSPITAL | Age: 54
LOS: 3 days | Discharge: OTHER INSTITUTION | End: 2024-05-30
Attending: EMERGENCY MEDICINE | Admitting: EMERGENCY MEDICINE
Payer: COMMERCIAL

## 2024-05-27 ENCOUNTER — HOSPITAL ENCOUNTER (EMERGENCY)
Facility: HOSPITAL | Age: 54
End: 2024-05-27
Attending: EMERGENCY MEDICINE | Admitting: EMERGENCY MEDICINE
Payer: COMMERCIAL

## 2024-05-27 VITALS
OXYGEN SATURATION: 97 % | TEMPERATURE: 98.1 F | HEART RATE: 104 BPM | RESPIRATION RATE: 17 BRPM | DIASTOLIC BLOOD PRESSURE: 72 MMHG | SYSTOLIC BLOOD PRESSURE: 146 MMHG

## 2024-05-27 DIAGNOSIS — F10.20 ALCOHOL USE DISORDER, SEVERE, DEPENDENCE (HCC): Primary | ICD-10-CM

## 2024-05-27 DIAGNOSIS — F10.929 ALCOHOL INTOXICATION (HCC): Primary | ICD-10-CM

## 2024-05-27 LAB
ALBUMIN SERPL BCP-MCNC: 4.9 G/DL (ref 3.5–5)
ALP SERPL-CCNC: 55 U/L (ref 34–104)
ALT SERPL W P-5'-P-CCNC: 33 U/L (ref 7–52)
ANION GAP SERPL CALCULATED.3IONS-SCNC: 11 MMOL/L (ref 4–13)
APAP SERPL-MCNC: <2 UG/ML (ref 10–20)
APTT PPP: 31 SECONDS (ref 23–37)
AST SERPL W P-5'-P-CCNC: 38 U/L (ref 13–39)
ATRIAL RATE: 81 BPM
BASOPHILS # BLD AUTO: 0.06 THOUSANDS/ÂΜL (ref 0–0.1)
BASOPHILS NFR BLD AUTO: 1 % (ref 0–1)
BILIRUB SERPL-MCNC: 0.28 MG/DL (ref 0.2–1)
BUN SERPL-MCNC: 9 MG/DL (ref 5–25)
CALCIUM SERPL-MCNC: 9.4 MG/DL (ref 8.4–10.2)
CHLORIDE SERPL-SCNC: 107 MMOL/L (ref 96–108)
CO2 SERPL-SCNC: 26 MMOL/L (ref 21–32)
CREAT SERPL-MCNC: 0.78 MG/DL (ref 0.6–1.3)
EOSINOPHIL # BLD AUTO: 0.06 THOUSAND/ÂΜL (ref 0–0.61)
EOSINOPHIL NFR BLD AUTO: 1 % (ref 0–6)
ERYTHROCYTE [DISTWIDTH] IN BLOOD BY AUTOMATED COUNT: 14.5 % (ref 11.6–15.1)
ETHANOL SERPL-MCNC: 314 MG/DL
GFR SERPL CREATININE-BSD FRML MDRD: 100 ML/MIN/1.73SQ M
GLUCOSE SERPL-MCNC: 99 MG/DL (ref 65–140)
HCT VFR BLD AUTO: 48.8 % (ref 36.5–49.3)
HGB BLD-MCNC: 16.4 G/DL (ref 12–17)
IMM GRANULOCYTES # BLD AUTO: 0.01 THOUSAND/UL (ref 0–0.2)
IMM GRANULOCYTES NFR BLD AUTO: 0 % (ref 0–2)
INR PPP: 0.88 (ref 0.84–1.19)
LYMPHOCYTES # BLD AUTO: 1.61 THOUSANDS/ÂΜL (ref 0.6–4.47)
LYMPHOCYTES NFR BLD AUTO: 29 % (ref 14–44)
MAGNESIUM SERPL-MCNC: 2.2 MG/DL (ref 1.9–2.7)
MCH RBC QN AUTO: 32.7 PG (ref 26.8–34.3)
MCHC RBC AUTO-ENTMCNC: 33.6 G/DL (ref 31.4–37.4)
MCV RBC AUTO: 97 FL (ref 82–98)
MONOCYTES # BLD AUTO: 0.89 THOUSAND/ÂΜL (ref 0.17–1.22)
MONOCYTES NFR BLD AUTO: 16 % (ref 4–12)
NEUTROPHILS # BLD AUTO: 3.02 THOUSANDS/ÂΜL (ref 1.85–7.62)
NEUTS SEG NFR BLD AUTO: 53 % (ref 43–75)
NRBC BLD AUTO-RTO: 0 /100 WBCS
P AXIS: 71 DEGREES
PHOSPHATE SERPL-MCNC: 3.6 MG/DL (ref 2.7–4.5)
PLATELET # BLD AUTO: 190 THOUSANDS/UL (ref 149–390)
PMV BLD AUTO: 10.2 FL (ref 8.9–12.7)
POTASSIUM SERPL-SCNC: 4.3 MMOL/L (ref 3.5–5.3)
PR INTERVAL: 148 MS
PROT SERPL-MCNC: 7.9 G/DL (ref 6.4–8.4)
PROTHROMBIN TIME: 12.6 SECONDS (ref 11.6–14.5)
QRS AXIS: 96 DEGREES
QRSD INTERVAL: 98 MS
QT INTERVAL: 386 MS
QTC INTERVAL: 448 MS
RBC # BLD AUTO: 5.02 MILLION/UL (ref 3.88–5.62)
SALICYLATES SERPL-MCNC: <5 MG/DL (ref 3–20)
SODIUM SERPL-SCNC: 144 MMOL/L (ref 135–147)
T WAVE AXIS: 54 DEGREES
VENTRICULAR RATE: 81 BPM
WBC # BLD AUTO: 5.65 THOUSAND/UL (ref 4.31–10.16)

## 2024-05-27 PROCEDURE — 93010 ELECTROCARDIOGRAM REPORT: CPT | Performed by: INTERNAL MEDICINE

## 2024-05-27 PROCEDURE — 80179 DRUG ASSAY SALICYLATE: CPT | Performed by: PHYSICIAN ASSISTANT

## 2024-05-27 PROCEDURE — 85730 THROMBOPLASTIN TIME PARTIAL: CPT | Performed by: PHYSICIAN ASSISTANT

## 2024-05-27 PROCEDURE — 96360 HYDRATION IV INFUSION INIT: CPT

## 2024-05-27 PROCEDURE — 84100 ASSAY OF PHOSPHORUS: CPT | Performed by: PHYSICIAN ASSISTANT

## 2024-05-27 PROCEDURE — 99285 EMERGENCY DEPT VISIT HI MDM: CPT | Performed by: PHYSICIAN ASSISTANT

## 2024-05-27 PROCEDURE — 93005 ELECTROCARDIOGRAM TRACING: CPT

## 2024-05-27 PROCEDURE — 85610 PROTHROMBIN TIME: CPT | Performed by: PHYSICIAN ASSISTANT

## 2024-05-27 PROCEDURE — 80143 DRUG ASSAY ACETAMINOPHEN: CPT | Performed by: PHYSICIAN ASSISTANT

## 2024-05-27 PROCEDURE — 99284 EMERGENCY DEPT VISIT MOD MDM: CPT

## 2024-05-27 PROCEDURE — 96361 HYDRATE IV INFUSION ADD-ON: CPT

## 2024-05-27 PROCEDURE — 85025 COMPLETE CBC W/AUTO DIFF WBC: CPT | Performed by: PHYSICIAN ASSISTANT

## 2024-05-27 PROCEDURE — 80053 COMPREHEN METABOLIC PANEL: CPT | Performed by: PHYSICIAN ASSISTANT

## 2024-05-27 PROCEDURE — 82077 ASSAY SPEC XCP UR&BREATH IA: CPT | Performed by: PHYSICIAN ASSISTANT

## 2024-05-27 PROCEDURE — 83735 ASSAY OF MAGNESIUM: CPT | Performed by: PHYSICIAN ASSISTANT

## 2024-05-27 PROCEDURE — 36415 COLL VENOUS BLD VENIPUNCTURE: CPT | Performed by: PHYSICIAN ASSISTANT

## 2024-05-27 RX ORDER — LANOLIN ALCOHOL/MO/W.PET/CERES
100 CREAM (GRAM) TOPICAL ONCE
Status: COMPLETED | OUTPATIENT
Start: 2024-05-27 | End: 2024-05-27

## 2024-05-27 RX ORDER — TRAZODONE HYDROCHLORIDE 50 MG/1
50 TABLET ORAL
Status: DISCONTINUED | OUTPATIENT
Start: 2024-05-27 | End: 2024-05-30 | Stop reason: HOSPADM

## 2024-05-27 RX ORDER — SODIUM CHLORIDE 9 MG/ML
125 INJECTION, SOLUTION INTRAVENOUS CONTINUOUS
Status: DISCONTINUED | OUTPATIENT
Start: 2024-05-27 | End: 2024-05-28

## 2024-05-27 RX ORDER — FOLIC ACID 1 MG/1
1 TABLET ORAL ONCE
Status: COMPLETED | OUTPATIENT
Start: 2024-05-27 | End: 2024-05-27

## 2024-05-27 RX ORDER — OXCARBAZEPINE 300 MG/1
600 TABLET, FILM COATED ORAL EVERY 12 HOURS SCHEDULED
Status: DISCONTINUED | OUTPATIENT
Start: 2024-05-28 | End: 2024-05-30 | Stop reason: HOSPADM

## 2024-05-27 RX ORDER — ENOXAPARIN SODIUM 100 MG/ML
40 INJECTION SUBCUTANEOUS DAILY
Status: DISCONTINUED | OUTPATIENT
Start: 2024-05-28 | End: 2024-05-30 | Stop reason: HOSPADM

## 2024-05-27 RX ORDER — ACETAMINOPHEN 325 MG/1
650 TABLET ORAL EVERY 6 HOURS PRN
Status: DISCONTINUED | OUTPATIENT
Start: 2024-05-27 | End: 2024-05-30 | Stop reason: HOSPADM

## 2024-05-27 RX ORDER — ONDANSETRON 2 MG/ML
4 INJECTION INTRAMUSCULAR; INTRAVENOUS EVERY 6 HOURS PRN
Status: DISCONTINUED | OUTPATIENT
Start: 2024-05-27 | End: 2024-05-30 | Stop reason: HOSPADM

## 2024-05-27 RX ADMIN — FOLIC ACID 1 MG: 1 TABLET ORAL at 15:28

## 2024-05-27 RX ADMIN — THIAMINE HCL TAB 100 MG 100 MG: 100 TAB at 15:28

## 2024-05-27 RX ADMIN — SODIUM CHLORIDE 1000 ML: 0.9 INJECTION, SOLUTION INTRAVENOUS at 15:31

## 2024-05-27 NOTE — ED NOTES
Report called to [facility], spoke to Destiney for nurse to nurse report        Irma Hernandez  05/27/24 1927

## 2024-05-27 NOTE — ED PROVIDER NOTES
"History  Chief Complaint   Patient presents with    Detox Evaluation     Pt presents to be evaluated and \"detox\" for rehab, has a bed waiting for him at [facility] once we are able to \"clear him\". Pt reports drinking 12 beer and 750 ml of vodka today.      55 yo male here seeking clearance for ETOH detox. States he called [facility] and they recommended getting medical clearance before they can consider him for a bed. He drinks about a liter of vodka daily. His most recent drink was in the parking lot here before coming in. He currently offers no complaints. States he needs to stop drinking because it has ruined his life. Denies HA, n/v, tremors, diaphoresis, AH/VH, SI/HI. No chest pain or sob.       History provided by:  Patient   used: No    Detox Evaluation  Associated symptoms: no abdominal pain, no palpitations, no seizures, no shortness of breath and no vomiting        Prior to Admission Medications   Prescriptions Last Dose Informant Patient Reported? Taking?   OXcarbazepine (TRILEPTAL) 600 mg tablet   No No   Sig: Take 1 tablet (600 mg total) by mouth every 12 (twelve) hours   thiamine 100 MG tablet   No No   Sig: Take 1 tablet (100 mg total) by mouth daily      Facility-Administered Medications: None       Past Medical History:   Diagnosis Date    Depression     ETOH abuse     Fatty liver     Hypertension        Past Surgical History:   Procedure Laterality Date    ADENOIDECTOMY      TONSILLECTOMY         History reviewed. No pertinent family history.  I have reviewed and agree with the history as documented.    E-Cigarette/Vaping    E-Cigarette Use Never User      E-Cigarette/Vaping Substances     Social History     Tobacco Use    Smoking status: Never    Smokeless tobacco: Never   Vaping Use    Vaping status: Never Used   Substance Use Topics    Alcohol use: Yes     Alcohol/week: 32.0 standard drinks of alcohol     Types: 12 Cans of beer, 20 Shots of liquor per week    Drug use: Not " "Currently     Types: Cocaine, \"Crack\" cocaine, Methamphetamines       Review of Systems   Constitutional:  Negative for chills and fever.   HENT:  Negative for ear pain and sore throat.    Eyes:  Negative for pain and visual disturbance.   Respiratory:  Negative for cough and shortness of breath.    Cardiovascular:  Negative for chest pain and palpitations.   Gastrointestinal:  Negative for abdominal pain and vomiting.   Genitourinary:  Negative for dysuria and hematuria.   Musculoskeletal:  Negative for arthralgias and back pain.   Skin:  Negative for color change and rash.   Neurological:  Negative for seizures and syncope.   All other systems reviewed and are negative.      Physical Exam  Physical Exam  Vitals and nursing note reviewed.   Constitutional:       General: He is not in acute distress.     Appearance: He is well-developed.   HENT:      Head: Normocephalic and atraumatic.   Eyes:      Conjunctiva/sclera: Conjunctivae normal.   Cardiovascular:      Rate and Rhythm: Normal rate and regular rhythm.      Heart sounds: No murmur heard.  Pulmonary:      Effort: Pulmonary effort is normal. No respiratory distress.      Breath sounds: Normal breath sounds.   Abdominal:      Palpations: Abdomen is soft.      Tenderness: There is no abdominal tenderness.   Musculoskeletal:         General: No swelling.      Cervical back: Neck supple.   Skin:     General: Skin is warm and dry.      Capillary Refill: Capillary refill takes less than 2 seconds.   Neurological:      Mental Status: He is alert.      Comments: GCS 15. AAOx3. Ambulating in department without difficulty. CN II-XII grossly intact. No focal neuro deficits.     Psychiatric:         Attention and Perception: Attention and perception normal.         Mood and Affect: Mood normal.         Vital Signs  ED Triage Vitals [05/27/24 1430]   Temperature Pulse Respirations Blood Pressure SpO2   98.1 °F (36.7 °C) 84 18 138/85 99 %      Temp Source Heart Rate Source " Patient Position - Orthostatic VS BP Location FiO2 (%)   Temporal Monitor Sitting Left arm --      Pain Score       --           Vitals:    05/27/24 1600 05/27/24 1700 05/27/24 1830 05/27/24 1900   BP: 111/64 111/65 122/69 146/72   Pulse: 66 66 97 104   Patient Position - Orthostatic VS: Lying Lying Lying Lying         Visual Acuity      ED Medications  Medications   thiamine tablet 100 mg (100 mg Oral Given 5/27/24 1528)   folic acid (FOLVITE) tablet 1 mg (1 mg Oral Given 5/27/24 1528)   sodium chloride 0.9 % bolus 1,000 mL (0 mL Intravenous Stopped 5/27/24 1922)       Diagnostic Studies  Results Reviewed       Procedure Component Value Units Date/Time    Comprehensive metabolic panel [[id number]] Collected: 05/27/24 1531    Lab Status: Final result Specimen: Blood from Arm, Right Updated: 05/27/24 1642     Sodium 144 mmol/L      Potassium 4.3 mmol/L      Chloride 107 mmol/L      CO2 26 mmol/L      ANION GAP 11 mmol/L      BUN 9 mg/dL      Creatinine 0.78 mg/dL      Glucose 99 mg/dL      Calcium 9.4 mg/dL      AST 38 U/L      ALT 33 U/L      Alkaline Phosphatase 55 U/L      Total Protein 7.9 g/dL      Albumin 4.9 g/dL      Total Bilirubin 0.28 mg/dL      eGFR 100 ml/min/1.73sq m     Narrative:      National Kidney Disease Foundation guidelines for Chronic Kidney Disease (CKD):     Stage 1 with normal or high GFR (GFR > 90 mL/min/1.73 square meters)    Stage 2 Mild CKD (GFR = 60-89 mL/min/1.73 square meters)    Stage 3A Moderate CKD (GFR = 45-59 mL/min/1.73 square meters)    Stage 3B Moderate CKD (GFR = 30-44 mL/min/1.73 square meters)    Stage 4 Severe CKD (GFR = 15-29 mL/min/1.73 square meters)    Stage 5 End Stage CKD (GFR <15 mL/min/1.73 square meters)  Note: GFR calculation is accurate only with a steady state creatinine    Magnesium [[id number]]  (Normal) Collected: 05/27/24 1531    Lab Status: Final result Specimen: Blood from Arm, Right Updated: 05/27/24 1642     Magnesium 2.2 mg/dL     Phosphorus [[id number]]   (Normal) Collected: 05/27/24 1531    Lab Status: Final result Specimen: Blood from Arm, Right Updated: 05/27/24 1642     Phosphorus 3.6 mg/dL     Salicylate level [[id number]]  (Normal) Collected: 05/27/24 1531    Lab Status: Final result Specimen: Blood from Arm, Right Updated: 05/27/24 1642     Salicylate Lvl <5 mg/dL     Acetaminophen level-If concentration is detectable, please discuss with medical  on call. [[id number]]  (Abnormal) Collected: 05/27/24 1531    Lab Status: Final result Specimen: Blood from Arm, Right Updated: 05/27/24 1642     Acetaminophen Level <2 ug/mL     Ethanol [[id number]]  (Abnormal) Collected: 05/27/24 1531    Lab Status: Final result Specimen: Blood from Arm, Right Updated: 05/27/24 1627     Ethanol Lvl 314 mg/dL     Protime-INR [[id number]]  (Normal) Collected: 05/27/24 1531    Lab Status: Final result Specimen: Blood from Arm, Right Updated: 05/27/24 1606     Protime 12.6 seconds      INR 0.88    APTT [[id number]]  (Normal) Collected: 05/27/24 1531    Lab Status: Final result Specimen: Blood from Arm, Right Updated: 05/27/24 1606     PTT 31 seconds     CBC and differential [[id number]]  (Abnormal) Collected: 05/27/24 1531    Lab Status: Final result Specimen: Blood from Arm, Right Updated: 05/27/24 1550     WBC 5.65 Thousand/uL      RBC 5.02 Million/uL      Hemoglobin 16.4 g/dL      Hematocrit 48.8 %      MCV 97 fL      MCH 32.7 pg      MCHC 33.6 g/dL      RDW 14.5 %      MPV 10.2 fL      Platelets 190 Thousands/uL      nRBC 0 /100 WBCs      Segmented % 53 %      Immature Grans % 0 %      Lymphocytes % 29 %      Monocytes % 16 %      Eosinophils Relative 1 %      Basophils Relative 1 %      Absolute Neutrophils 3.02 Thousands/µL      Absolute Immature Grans 0.01 Thousand/uL      Absolute Lymphocytes 1.61 Thousands/µL      Absolute Monocytes 0.89 Thousand/µL      Eosinophils Absolute 0.06 Thousand/µL      Basophils Absolute 0.06 Thousands/µL                    No orders to  display              Procedures  Procedures         ED Course                               SBIRT 20yo+      Flowsheet Row Most Recent Value   Initial Alcohol Screen: US AUDIT-C     1. How often do you have a drink containing alcohol? 6 Filed at: 05/27/2024 1609   2. How many drinks containing alcohol do you have on a typical day you are drinking?  6 Filed at: 05/27/2024 1609   3a. Male UNDER 65: How often do you have five or more drinks on one occasion? 6 Filed at: 05/27/2024 1609   Audit-C Score 18 Filed at: 05/27/2024 1609   Full Alcohol Screen: US AUDIT    4. How often during the last year have you found that you were not able to stop drinking once you had started? 4 Filed at: 05/27/2024 1609   5. How often during past year have you failed to do what was normally expected of you because of drinking?  3 Filed at: 05/27/2024 1609   6. How often in past year have you needed a first drink in the morning to get yourself going after a heavy drinking session?  4 Filed at: 05/27/2024 1609   7. How often in past year have you had feeling of guilt or remorse after drinking?  0 Filed at: 05/27/2024 1609   8. How often in past year have you been unable to remember what happened night before because you had been drinking?  0 Filed at: 05/27/2024 1609   9. Have you or someone else been injured as a result of your drinking?  0 Filed at: 05/27/2024 1609   10. Has a relative, friend, doctor or other health worker been concerned about your drinking and suggested you cut down?  4 Filed at: 05/27/2024 1609   AUDIT Total Score 33 Filed at: 05/27/2024 1609   EVELIN: How many times in the past year have you...    Used an illegal drug or used a prescription medication for non-medical reasons? Never Filed at: 05/27/2024 1609                      Medical Decision Making  DDx including but not limited to: Alcohol intoxication, metabolic abnormality,  substance abuse. Plan: discuss with detox.     MDM: 55 yo here requesting ETOH detox.  Discussed with on call detox center. Agrees with transfer. Pt without withdrawal symptoms at this time however had his last drink immediately before coming in. Pt agrees with plan. Stable at time of transfer.     Amount and/or Complexity of Data Reviewed  Labs: ordered.    Risk  OTC drugs.             Disposition  Final diagnoses:   Alcohol intoxication (HCC)     Time reflects when diagnosis was documented in both MDM as applicable and the Disposition within this note       Time User Action Codes Description Comment    5/27/2024  4:58 PM Tulio Merritt Add [F10.929] Alcohol intoxication (HCC)           ED Disposition       ED Disposition   Transfer to Another Facility-In Network    Condition   --    Date/Time   Mon May 27, 2024 6622    Comment   Dayton Daley should be transferred out to  T[facility].               MD Documentation      Flowsheet Row Most Recent Value   Patient Condition The patient has been stabilized such that within reasonable medical probability, no material deterioration of the patient condition or the condition of the unborn child(maria r) is likely to result from the transfer   Reason for Transfer Level of Care needed not available at this facility   Benefits of Transfer Specialized equipment and/or services available at the receiving facility (Include comment)________________________   Risks of Transfer Potential for delay in receiving treatment   Accepting Physician Dr. Gray   Accepting Facility Name, [facility]    (Name & Tel number) [facility]   Sending MD Dr. Thompson/Tulio Merritt PADALIA   Provider Certification General risk, such as traffic hazards, adverse weather conditions, rough terrain or turbulence, possible failure of equipment (including vehicle or aircraft), or consequences of actions of persons outside the control of the transport personnel          RN Documentation      Flowsheet Row Most Recent Value   Accepting Facility Name, [facility]     (Name & Tel number) PAC          Follow-up Information    None         Discharge Medication List as of 5/27/2024  7:27 PM        CONTINUE these medications which have NOT CHANGED    Details   OXcarbazepine (TRILEPTAL) 600 mg tablet Take 1 tablet (600 mg total) by mouth every 12 (twelve) hours, Starting Fri 4/26/2024, Normal      thiamine 100 MG tablet Take 1 tablet (100 mg total) by mouth daily, Starting Sat 4/27/2024, Normal             No discharge procedures on file.    PDMP Review       None            ED Provider  Electronically Signed by             Tulio Merritt PA-C  05/27/24 2006

## 2024-05-27 NOTE — EMTALA/ACUTE CARE TRANSFER
[facility] EMERGENCY DEPARTMENT  [address]  [place] [address]  Dept: [phone]      EMTALA TRANSFER CONSENT    NAME Dayton Daley                                         1970                              MRN [id number]    I have been informed of my rights regarding examination, treatment, and transfer   by Dr. Eleuterio Thompson MD    Benefits: Specialized equipment and/or services available at the receiving facility (Include comment)________________________    Risks: Potential for delay in receiving treatment      Consent for Transfer:  I acknowledge that my medical condition has been evaluated and explained to me by the emergency department physician or other qualified medical person and/or my attending physician, who has recommended that I be transferred to the service of  Accepting Physician: Dr. Gray at Accepting Facility Name, City & State : [facility]. The above potential benefits of such transfer, the potential risks associated with such transfer, and the probable risks of not being transferred have been explained to me, and I fully understand them.  The doctor has explained that, in my case, the benefits of transfer outweigh the risks.  I agree to be transferred.    I authorize the performance of emergency medical procedures and treatments upon me in both transit and upon arrival at the receiving facility.  Additionally, I authorize the release of any and all medical records to the receiving facility and request they be transported with me, if possible.  I understand that the safest mode of transportation during a medical emergency is an ambulance and that the Hospital advocates the use of this mode of transport. Risks of traveling to the receiving facility by car, including absence of medical control, life sustaining equipment, such as oxygen, and medical personnel has been explained to me and I fully understand them.    (SEBLE CORRECT BOX BELOW)  [ X ]  I consent  to the stated transfer and to be transported by ambulance/helicopter.  [  ]  I consent to the stated transfer, but refuse transportation by ambulance and accept full responsibility for my transportation by car.  I understand the risks of non-ambulance transfers and I exonerate the Hospital and its staff from any deterioration in my condition that results from this refusal.    X___________________________________________    DATE  24  TIME________  Signature of patient or legally responsible individual signing on patient behalf           RELATIONSHIP TO PATIENT_________________________          Provider Certification    NAME Dayton Daley                                         1970                              MRN [id number]    A medical screening exam was performed on the above named patient.  Based on the examination:    Condition Necessitating Transfer The encounter diagnosis was Alcohol intoxication (HCC).    Patient Condition: The patient has been stabilized such that within reasonable medical probability, no material deterioration of the patient condition or the condition of the unborn child(maria r) is likely to result from the transfer    Reason for Transfer: Level of Care needed not available at this facility    Transfer Requirements: Facility  Detox   Space available and qualified personnel available for treatment as acknowledged by PAC  Agreed to accept transfer and to provide appropriate medical treatment as acknowledged by       Dr. Gray  Appropriate medical records of the examination and treatment of the patient are provided at the time of transfer   STAFF INITIAL WHEN COMPLETED _______  Transfer will be performed by qualified personnel from    and appropriate transfer equipment as required, including the use of necessary and appropriate life support measures.    Provider Certification: I have examined the patient and explained the following risks and benefits of being transferred/refusing  transfer to the patient/family:  General risk, such as traffic hazards, adverse weather conditions, rough terrain or turbulence, possible failure of equipment (including vehicle or aircraft), or consequences of actions of persons outside the control of the transport personnel      Based on these reasonable risks and benefits to the patient and/or the unborn child(maria r), and based upon the information available at the time of the patient’s examination, I certify that the medical benefits reasonably to be expected from the provision of appropriate medical treatments at another medical facility outweigh the increasing risks, if any, to the individual’s medical condition, and in the case of labor to the unborn child, from effecting the transfer.    X____________________________________________ DATE 05/27/24        TIME_______      ORIGINAL - SEND TO MEDICAL RECORDS   COPY - SEND WITH PATIENT DURING TRANSFER

## 2024-05-28 PROBLEM — E87.6 HYPOKALEMIA: Status: ACTIVE | Noted: 2024-05-28

## 2024-05-28 LAB
ALBUMIN SERPL BCP-MCNC: 3.9 G/DL (ref 3.5–5)
ALP SERPL-CCNC: 44 U/L (ref 34–104)
ALT SERPL W P-5'-P-CCNC: 39 U/L (ref 7–52)
ANION GAP SERPL CALCULATED.3IONS-SCNC: 11 MMOL/L (ref 4–13)
AST SERPL W P-5'-P-CCNC: 55 U/L (ref 13–39)
BILIRUB SERPL-MCNC: 0.35 MG/DL (ref 0.2–1)
BUN SERPL-MCNC: 11 MG/DL (ref 5–25)
CALCIUM SERPL-MCNC: 8.9 MG/DL (ref 8.4–10.2)
CHLORIDE SERPL-SCNC: 105 MMOL/L (ref 96–108)
CO2 SERPL-SCNC: 25 MMOL/L (ref 21–32)
CREAT SERPL-MCNC: 0.78 MG/DL (ref 0.6–1.3)
ERYTHROCYTE [DISTWIDTH] IN BLOOD BY AUTOMATED COUNT: 14.3 % (ref 11.6–15.1)
GFR SERPL CREATININE-BSD FRML MDRD: 100 ML/MIN/1.73SQ M
GLUCOSE SERPL-MCNC: 113 MG/DL (ref 65–140)
HCT VFR BLD AUTO: 41.7 % (ref 36.5–49.3)
HGB BLD-MCNC: 14.8 G/DL (ref 12–17)
MAGNESIUM SERPL-MCNC: 1.7 MG/DL (ref 1.9–2.7)
MCH RBC QN AUTO: 33.3 PG (ref 26.8–34.3)
MCHC RBC AUTO-ENTMCNC: 35.5 G/DL (ref 31.4–37.4)
MCV RBC AUTO: 94 FL (ref 82–98)
PLATELET # BLD AUTO: 178 THOUSANDS/UL (ref 149–390)
PMV BLD AUTO: 10.6 FL (ref 8.9–12.7)
POTASSIUM SERPL-SCNC: 3.4 MMOL/L (ref 3.5–5.3)
PROT SERPL-MCNC: 6.6 G/DL (ref 6.4–8.4)
RBC # BLD AUTO: 4.45 MILLION/UL (ref 3.88–5.62)
SODIUM SERPL-SCNC: 141 MMOL/L (ref 135–147)
WBC # BLD AUTO: 4.57 THOUSAND/UL (ref 4.31–10.16)

## 2024-05-28 PROCEDURE — 83735 ASSAY OF MAGNESIUM: CPT | Performed by: PHYSICIAN ASSISTANT

## 2024-05-28 PROCEDURE — 80053 COMPREHEN METABOLIC PANEL: CPT | Performed by: PHYSICIAN ASSISTANT

## 2024-05-28 PROCEDURE — 85027 COMPLETE CBC AUTOMATED: CPT | Performed by: PHYSICIAN ASSISTANT

## 2024-05-28 PROCEDURE — 99233 SBSQ HOSP IP/OBS HIGH 50: CPT

## 2024-05-28 RX ORDER — FOLIC ACID 1 MG/1
1 TABLET ORAL DAILY
Status: DISCONTINUED | OUTPATIENT
Start: 2024-05-28 | End: 2024-05-30 | Stop reason: HOSPADM

## 2024-05-28 RX ORDER — MAGNESIUM SULFATE HEPTAHYDRATE 40 MG/ML
2 INJECTION, SOLUTION INTRAVENOUS ONCE
Status: COMPLETED | OUTPATIENT
Start: 2024-05-28 | End: 2024-05-28

## 2024-05-28 RX ORDER — POTASSIUM CHLORIDE 20 MEQ/1
40 TABLET, EXTENDED RELEASE ORAL ONCE
Status: COMPLETED | OUTPATIENT
Start: 2024-05-28 | End: 2024-05-28

## 2024-05-28 RX ORDER — LANOLIN ALCOHOL/MO/W.PET/CERES
100 CREAM (GRAM) TOPICAL DAILY
Status: DISCONTINUED | OUTPATIENT
Start: 2024-05-28 | End: 2024-05-30 | Stop reason: HOSPADM

## 2024-05-28 RX ADMIN — THIAMINE HCL TAB 100 MG 100 MG: 100 TAB at 13:26

## 2024-05-28 RX ADMIN — MAGNESIUM SULFATE HEPTAHYDRATE 2 G: 2 INJECTION, SOLUTION INTRAVENOUS at 08:57

## 2024-05-28 RX ADMIN — OXCARBAZEPINE 600 MG: 300 TABLET, FILM COATED ORAL at 21:15

## 2024-05-28 RX ADMIN — FOLIC ACID 1 MG: 1 TABLET ORAL at 13:26

## 2024-05-28 RX ADMIN — POTASSIUM CHLORIDE 40 MEQ: 1500 TABLET, EXTENDED RELEASE ORAL at 08:36

## 2024-05-28 RX ADMIN — OXCARBAZEPINE 600 MG: 300 TABLET, FILM COATED ORAL at 08:06

## 2024-05-28 RX ADMIN — MULTIPLE VITAMINS W/ MINERALS TAB 1 TABLET: TAB ORAL at 08:06

## 2024-05-28 NOTE — DISCHARGE INSTR - OTHER ORDERS
Outpatient Drug & Alcohol Treatment   The Critical access hospital currently operates an outpatient treatment unit:  [facility] in [place], [place] as a functional unit of the [facility]  The Outpatient treatment units are licensed by the [place] Department of Drug & Alcohol Programs to provide individual and group counseling for those with substance abuse and dependency problems. The clinical staff is experienced in a variety of therapeutic modalities and provides treatment that is individualized to meet the particular needs of each person. These units are drug-free treatment programs.  The Critical access hospital accepts most major healthcare insurance coverage plans, [place] Medical Assistance and in those cases where the consumer has no third party healthcare coverage, a liberal sliding fee schedule is utilized. The length of service and type of outpatient service provided is based on the results of the Level of Care Assessment            There are currently three treatment protocols available:  Outpatient  Intensive Outpatient  Contracted services for Partial Hospitalization  Therapy is provided in both Individual and Group counseling formats. The Outpatient department offers individual counseling for the family members of substance abusers to address co-dependent and enabling behaviors.    Outpatient treatment services in [facility] are purchased through a fee-for-service subcontract with:  [facility]  [address] [place] [address]   Phone: [phone]    [facility] Outpatient  [facility], [address]  [address]  [place] [address]   New Admissions [phone]  Local office [phone]    Outpatient treatment services in [facility] are purchased through fee-for-service subcontracts with:  [facility]   [address]  [place], [place] [address]  Phone: [phone]    [facility] Outpatient  [address]  [place] [address]  Phone: New Admissions  [phone] / Local Office [phone]  Outpatient treatment services are also available to [facility] residents in our [facility] Office.24/7 Mental Health Crisis Hotline  [facility]  [phone]    [facility] Toll Free: [phone]      Michelle Worley   Certified     [facility]  [phone]    [facility]  [address] [place] [address]  [phone]  [facility]

## 2024-05-28 NOTE — UTILIZATION REVIEW
Initial Clinical Review    Pt initially presented to [facility] ED. Pt was transferred by EMS to [facility] for its Level IV medically managed intensive inpatient detox unit, not available at [facility].    Admission: Date/Time/Statement:   Admission Orders (From admission, onward)       Ordered        05/27/24 2047  Inpatient Admission  Once                          Orders Placed This Encounter   Procedures    Inpatient Admission     Standing Status:   Standing     Number of Occurrences:   1     Order Specific Question:   Level of Care     Answer:   Med Surg [16]     Order Specific Question:   Estimated length of stay     Answer:   More than 2 Midnights     Order Specific Question:   Certification     Answer:   I certify that inpatient services are medically necessary for this patient for a duration of greater than two midnights. See H&P and MD Progress Notes for additional information about the patient's course of treatment.     ED Arrival Information      Initial Presentation: 54 y.o. male who presented to medical detox. Inpatient admission for evaluation and treatment of alcohol withdrawal syndrome. Presented w/ need for detox from alcohol. Serum ETOH: 314. Reports vodka 750 ml, beer  daily, last drink on 5/27 @ 1400. Has prior rehab treatment for withdrawal. Reports no hx of withdrawal seizures. On exam, pt has no deficits or withdrawal symptoms. SEWS 0. Plan: SEWS monitoring w/ phenobarbital management, IV thiamine/folic acid supplement, IVF, telemetry, continuous pulse ox, continue PTA meds, trend labs, replete electrolytes as needed.       Date: 5/28       Day 2: Pt reports no withdrawal symptoms. On exam, no deficits, requesting IP rehab. SEWS 0. Plan: continue SEWS monitoring w/ phenobarbital management, IV thiamine/folic acid supplement, telemetry, continuous pulse ox, continue current meds, trend labs, replete electrolytes as needed.     ED Triage Vitals [05/27/24 2037]    Temperature Pulse Respirations Blood Pressure SpO2   98.4 °F (36.9 °C) 75 18 143/87 99 %      Temp Source Heart Rate Source Patient Position - Orthostatic VS BP Location FiO2 (%)   Temporal Monitor Lying Right arm --      Pain Score       No Pain          Wt Readings from Last 1 Encounters:   05/27/24 79.4 kg (175 lb)     Vital Signs:   Date/Time Temp Pulse Resp BP MAP (mmHg) SpO2 O2 Device Patient Position - Orthostatic VS   05/28/24 0908 97.8 °F (36.6 °C) 74 18 112/71 84 95 % None (Room air) Lying   05/28/24 0430 97.7 °F (36.5 °C) 72 18 130/71 90 97 % None (Room air) Lying   05/28/24 0036 99.2 °F (37.3 °C) 69 18 127/78 94 98 % None (Room air) Lying   05/27/24 2037 98.4 °F (36.9 °C) 75 18 143/87 105 99 % None (Room air) Lying     Severity of Ethanol Withdrawal Scale (SEWS):     SEWS    Row Name 05/28/24 0400 05/28/24 0000 05/27/24 2037     Severity of Ethanol Withdrawal Scale (SEWS)   ANXIETY: Do you feel that something bad is about to happen to you right now? 0  -NR 0  -NR 0  -NR     NAUSEA and DRY HEAVES or VOMITING? 0  -NR 0  -NR 0  -NR     SWEATING: (includes moist palms, sweating now)? Score 0 or 2 0  -NR 0  -NR 0  -NR     TREMOR: with arms extended eyes closed? 0  -NR 0  -NR 0  -NR     AGITATION: Fidgety, restless, pacing? 0  -NR 0  -NR 0  -NR     DISORIENTATION: 0  -NR 0  -NR 0  -NR     HALLUCINATIONS: 0  -NR 0  -NR 0  -NR     VITAL SIGNS: ANY (Pulse >110, Diastolic BP >90, Temp >99.6) 0  -NR 0  -NR 0  -NR     SEWS Total Score 0  -NR 0  -NR 0  -NR     Patterson Agitation Sedation Scale (RASS)   Patterson Agitation Sedation Scale (RASS) 0  -NR 0  -NR 0  -NR         Pertinent Labs/Diagnostic Test Results:     5/27 ECG - Normal sinus rhythm  Rightward axis  Borderline ECG      Results from last 7 days   Lab Units 05/28/24  0445 05/27/24  1531   WBC Thousand/uL 4.57 5.65   HEMOGLOBIN g/dL 14.8 16.4   HEMATOCRIT % 41.7 48.8   PLATELETS Thousands/uL 178 190   TOTAL NEUT ABS Thousands/µL  --  3.02         Results  from last 7 days   Lab Units 05/28/24  0445 05/27/24  1531   SODIUM mmol/L 141 144   POTASSIUM mmol/L 3.4* 4.3   CHLORIDE mmol/L 105 107   CO2 mmol/L 25 26   ANION GAP mmol/L 11 11   BUN mg/dL 11 9   CREATININE mg/dL 0.78 0.78   EGFR ml/min/1.73sq m 100 100   CALCIUM mg/dL 8.9 9.4   MAGNESIUM mg/dL 1.7* 2.2   PHOSPHORUS mg/dL  --  3.6     Results from last 7 days   Lab Units 05/28/24  0445 05/27/24  1531   AST U/L 55* 38   ALT U/L 39 33   ALK PHOS U/L 44 55   TOTAL PROTEIN g/dL 6.6 7.9   ALBUMIN g/dL 3.9 4.9   TOTAL BILIRUBIN mg/dL 0.35 0.28         Results from last 7 days   Lab Units 05/28/24  0445 05/27/24  1531   GLUCOSE RANDOM mg/dL 113 99     Results from last 7 days   Lab Units 05/27/24  1531   PROTIME seconds 12.6   INR  0.88   PTT seconds 31           Results from last 7 days   Lab Units 05/27/24  1531   ETHANOL LVL mg/dL 314*   ACETAMINOPHEN LVL ug/mL <2*   SALICYLATE LVL mg/dL <5       Past Medical History:   Diagnosis Date    Depression     ETOH abuse     Fatty liver     Hypertension      Present on Admission:   Alcohol use disorder, severe, dependence (HCC)   Anxiety and depression   HTN (hypertension)   Alcohol withdrawal syndrome with complication (HCC)   Hypomagnesemia      Admitting Diagnosis: Detox  Age/Sex: 54 y.o. male  Admission Orders:  REgular Diet. SCDs.  Fall & Seizure Precautions.  SEWS monitoring.  Telemetry & Continuous Pulse Ox.    Scheduled Medications:  enoxaparin, 40 mg, Subcutaneous, Daily  folic acid 1 mg, thiamine (VITAMIN B1) 100 mg in sodium chloride 0.9 % 100 mL IV piggyback, , Intravenous, Daily  magnesium sulfate, 2 g, Intravenous, Once  multivitamin-minerals, 1 tablet, Oral, Daily  OXcarbazepine, 600 mg, Oral, Q12H DOROTA      Continuous IV Infusions:  sodium chloride, 125 mL/hr, Intravenous, Continuous      PRN Meds:  acetaminophen, 650 mg, Oral, Q6H PRN  ondansetron, 4 mg, Intravenous, Q6H PRN  traZODone, 50 mg, Oral, HS PRN    Pt had no detox meds in ED  Tele  Continuous  pulse ox  IP CONSULT TO CASE MANAGEMENT  CONSULT TO CERTIFIED     Network Utilization Review Department  ATTENTION: Please call with any questions or concerns to [phone] and carefully listen to the prompts so that you are directed to the right person. All voicemails are confidential.   For Discharge needs, contact Care Management DC Support Team at [phone] opt. 2  Send all requests for admission clinical reviews, approved or denied determinations and any other requests to dedicated fax number below belonging to the [place] where the patient is receiving treatment. List of dedicated fax numbers for the Facilities:  FACILITY NAME UR FAX NUMBER   ADMISSION DENIALS (Administrative/Medical Necessity) [phone]   DISCHARGE SUPPORT TEAM (NETWORK) [phone]   PARENT CHILD HEALTH (Maternity/NICU/Pediatrics) [phone]   [facility] [phone]   [facility] [phone]   [facility] [phone]   [facility] [phone]   [facility] [phone]   [facility] [phone]   [facility] [phone]   [facility] [phone]   [facility] [phone]   [facility] [phone]   [facility] [phone]   [facility] [phone]

## 2024-05-28 NOTE — CERTIFIED RECOVERY SPECIALIST
"   Certified  Note    Patient name: Dayton Daley  Location: 5T DETOX 506/5T DETOX 50*  [place]: [facility]  Attending:  Reggie Ambrose DO MRN [id number]  : 1970  Age: 54 y.o.    Sex: male Date 2024         Substance Use History:     Social History     Substance and Sexual Activity   Alcohol Use Yes    Alcohol/week: 32.0 standard drinks of alcohol    Types: 12 Cans of beer, 20 Shots of liquor per week        Social History     Substance and Sexual Activity   Drug Use Not Currently    Types: Cocaine, \"Crack\" cocaine, Methamphetamines     CRS attempted to engage, patient resting comfortably.     CRS will continue to follow until discharge     Resources provided           Michelle Worley       "

## 2024-05-28 NOTE — PLAN OF CARE
Problem: SUBSTANCE USE/ABUSE  Goal: By discharge, will develop insight into their chemical dependency and sustain motivation to continue in recovery  Description: INTERVENTIONS:  - Attends all daily group sessions and scheduled AA groups  - Actively practices coping skills through participation in the therapeutic community and adherence to program rules  - Reviews and completes assignments from individual treatment plan  - Assist patient development of understanding of their personal cycle of addiction and relapse triggers  5/27/2024 2132 by Destiney Jennings RN  Outcome: Progressing  5/27/2024 2132 by Destiney Jennings RN  Outcome: Progressing  Goal: By discharge, patient will have ongoing treatment plan addressing chemical dependency  Description: INTERVENTIONS:  - Assist patient with resources and/or appointments for ongoing recovery based living  5/27/2024 2132 by Destiney Jennings RN  Outcome: Progressing  5/27/2024 2132 by Destiney Jennings RN  Outcome: Progressing

## 2024-05-28 NOTE — ASSESSMENT & PLAN NOTE
H/o chronic daily alcohol consumption, denies h/o withdrawal seizures  Last drink: 5/27/24 @ 2pm  Ethanol lvl: 314  Follow SEWS protocol with symptom-triggered phenobarbital for medically-assisted alcohol withdrawal  Currently not endorsing withdrawal symptoms  Will discontinue SEWS protocol this afternoon.   Telemetry and pulse oximetry monitoring   Continue to monitor vitals, symptoms

## 2024-05-28 NOTE — PROGRESS NOTES
"Additional Substance Use Detail    Questions Responses   Problems Due to Past Use of Alcohol? Yes   Problems Due to Past Use of Substances? No   Substance Use Assessment Substance use within the past 12 months   Alcohol Use Frequency Daily   Cannabis frequency Past occasional use   Comment:  Past occasional use on 5/28/2024    Alcohol Drink of Choice vodka, beer   Cannabis method Smoke   Comment:  Smoke on 5/28/2024    1st Use of Alcohol 14   Cannabis 1st Use 20   Last Use of Alcohol & Amount 4/24/24- 1/2 handle of vodka   Cannabis last use 5/16/23   Comment:  5/16/2023 on 5/28/2024    Longest Abstinence from Alcohol 8 months   Cocaine frequency Past occasional use   Comment:  Past occasional use on 5/28/2024    Cocaine method Smoke   Comment:  Smoke on 5/28/2024    Cocaine First Use 24   Cocaine last use 10/2/23   Comment:  10/2/2023 on 5/28/2024 05/28/24 1215   Substance Abuse Addendum Details   History of Withdrawal Symptoms Elevated BP   Medical Complications Has blacked out while drinking, denies hx of seizures, denies hx of dt's   Sober Supports AA, family members, (Mother, Father, Sister, Brother)   Present Treatment None   Substance Abuse Treatment Hx Past Tx, Inpatient;Past detox;Attends AA/NA;Past Tx, Outpatient   ASAM Level & Criteria 3.5, Has failed outpatient, has had short periods of abstinence in the community.   Stage of Change   Stage of Change Contemplation        05/28/24 1226   Patient Intake   Living Arrangement House   Can patient return home? Yes   Address to be Discharge to: [address] [place] [address] [place] [address]   Patient's Telephone Number [phone]   Access to Firearms No   Type of Work Self Employed   Work History Self-employed   Admission Status   [facility]   Patient History   Presenting Problems \"I started Drinking Again\" \"My family got involved- to suggest that I get help\"   Treatment History [facility] Mcgar[facility], [facility] (3 locations), [facility], " [facility] Mclaughlin   Currently in Treatment Yes   Current Psychiatrist/Therapist [facility]   Current Treatment Appt Info Appt today?   Medical Problems Denies   Legal Issues On probation for DUI- pending loss of license on June 20   Indicate type of legal issues present: Probation   Substance Abuse Yes (See BH History section for detail)   Crisis Info   Release of Information Signed Yes  ([facility], Mother, Father, Erasmo Leonardo,)        05/28/24 1237   Patient Information   Mental Status Alert   Primary Caregiver Self   Support System Immediate family   Voodoo/Cultural Requests Voodoo   Activities of Daily Living Prior to Admission   Functional Status Independent   Assistive Device No device needed   Ambulation Independent   Means of Transportation   Means of Transport to Appts: Drives Self  (License will be revoked for 1 year beginning June 20, 2024 for DUI)     Met with patient who was fully alert and oriented, pleasant and cooperative.  Pt reports drinking all day, daily and family has encouraged him to seek help.   Pt states he has [facility] and is hopeful to go there upon completing his detox here at [facility].   Pt signed JESUS's and completed his Relapse Prevention Plan.  ASAM level of Care completed.   Level 3.5 recommended.  Pt is is contemplation stage of change  Pt states he is motivated to stop drinking, he wants to have a relationship with his family that is not strained.   His parents will be moving to [place] and he hopes to move with them if he is able to successfully complete an inpatient and an outpatient program and maintain his sobriety.     Writer contacted [facility]. They have been in touch with patient and are working on accepting him into their program. They do not anticipate having an available bed until Sunday or Monday. They are suggesting that patient go home upon completion of his detox here at [facility] and await an  available bed while at home. Writer did express concern for relapse in between.     Writer will forward pt's information to Erasmo Leonardo.  Pt is aware of situation.       [facility]           May 28, 2024  Hospital Account: [id number]  MRN: [id number]        Encounter Date/Time: 2024   Guarantor:  DAWNA HOFF    Contact Serial: [id number]      ENCOUNTER          Patient Class: Inpatient   Unit:  5T DETOX   Hospital Service: Emergency Medicine   Bed: 5T DETOX 506/5T DETOX 50*   Admitting Provider: Yadiel Pathak Do   Referring Physician: Tulio Merritt   Attending Provider: JABARI ANDERSON DO   Adm Diagnosis: Detox   Admit Source: [facility] [27]         PATIENT                 Name: DAWNA HOFF PRAVIN   : 1970 (54 yrs)   Address: [address] [place] [address] Sex: Male   City: [place] [address]* Email: [email or web address]   Primary Care Provider: Yadiel Lu MD Primary Phone: [phone]   Preferred Language: English [22] Written Language: English [28]   Scientology: Episcopalian [21]  Field: No Never Served [10]   Patient Type:     Employer:   Status: Self Employed [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 Relationship to Patient Home Phone Mobile Phone   1. marie Hoff  2. amrita Hoff      Father  Spouse      [phone]      GUARANTOR            Guarantor: DAWNA HOFF     : 1970   Address: [address] [place] [address]  Sex: [place] [address]     Relation to Patient: Self       Home Phone: [phone]   Guarantor ID: [id number]       Work Phone:     GUARANTOR EMPLOYER   Employer:           Status: SELF EMPL*   COVERAGE        PRIMARY INSURANCE   [place] Plan[place] MEDICA[place]   Group Number:   Insurance Type: INDEMNITY   Subscriber Name: DAWNA HOFF Subscriber : 1970   Subscriber ID: [id number] Pat. Rel to Subscriber: Self

## 2024-05-28 NOTE — ASSESSMENT & PLAN NOTE
Vitals:    05/27/24 2037   BP: 143/87   Pulse: 75   Resp: 18   Temp: 98.4 °F (36.9 °C)   SpO2: 99%       Hx of hypertension  Not on any current medication  Continue to monitor BP   Will initiate anti-hypertensive if patient BP continues to be elevated despite adequate withdrawal management

## 2024-05-28 NOTE — H&P
HISTORY & PHYSICAL EXAM  DEPARTMENT OF MEDICAL TOXICOLOGY  LEVEL 4 MEDICAL DETOX UNIT  Dayton Daley 54 y.o. male MRN: [id number]  Unit/Bed#: [address] [place] [address] [id number] Encounter: [id number]      Reason for Admission/Principal Problem: Ethanol withdrawal, Ethanol use disorder  Admitting Provider: Ron Mcleod PA-C  Attending Provider: Yadiel Pathak DO   5/27/2024  8:17 PM        HTN (hypertension)  Assessment & Plan  Vitals:    05/27/24 2037   BP: 143/87   Pulse: 75   Resp: 18   Temp: 98.4 °F (36.9 °C)   SpO2: 99%       Hx of hypertension  Not on any current medication  Continue to monitor BP   Will initiate anti-hypertensive if patient BP continues to be elevated despite adequate withdrawal management     Anxiety and depression  Assessment & Plan  Patient endorses a h/o chronic anxiety and depression  Denies any SI, HI or AVH  Home medications include: Trileptal 600 mg BID  Continue home medication regimen  Recommend OP f/u with PCP/OP Psychiatry     Alcohol use disorder, severe, dependence (HCC)  Assessment & Plan  Pt with a chronic alcohol use for the past 40 years   Last admitted to detox unit 04/24/24 - 04/26/24  Failed to get into inpatient Rehab at [facility] during last admission, discharged with outpatient resources  Received Vivitrol shot prior to discharge; relapsed immediately upon discharge  Prior inpatient treatment hx  No prior h/o withdrawal seizure or DT  Withdrawal management as above  Initiate IVFs, IV thiamine, folic acid, and MVI  Consult case management/CRS for assistance with aftercare resources - pt interested in inpatient at [facility]        * Alcohol withdrawal syndrome with complication (HCC)  Assessment & Plan  H/o chronic daily alcohol consumption, denies h/o withdrawal seizures  Last drink: 5/27/24 @ 2pm  Ethanol lvl: 314  Follow SEWS protocol with symptom-triggered phenobarbital for medically-assisted alcohol withdrawal  Currently not endorsing withdrawal symptoms  Will continue  to monitor on SEWS protocol and dose phenobarbital as indicated.   Telemetry and pulse oximetry monitoring   Continue to monitor vitals, symptoms               VTE Prophylaxis: Enoxaparin (Lovenox)  / sequential compression device   Code Status: Full code      Anticipated Length of Stay:  Patient will be admitted on an Inpatient basis with an anticipated length of stay of  2-3  midnights.   Justification for Hospital Stay: Alcohol withdrawal syndrome with complication    For any questions or concerns, please Tiger Text the advanced practitioner in the role of [facility]-AP On Call      This patient qualifies for Level IV medically managed intensive inpatient services under the criteria set by the American Society of Addiction Medicine, including dimensions 1-3. The patient is in withdrawal (or is intoxicated with high risk of withdrawal), with severe and unstable medical and/or psychiatric (dual diagnosis) problems, requiring requires 24-hour medical and nursing care and the full resources of a [facility] hospital.          SEWS PHENOBARBITAL PROTOCOL FOR ALCOHOL WITHDRAWAL    Admit patient to medical detox unit and continue supportive care and stabilization of acute ethanol withdrawal per medical toxicology/detox treatment pathway. Monitor ethanol withdrawal severity via the Severity of Ethanol Withdrawal Scale (SEWS) Q4 hours and then hourly if/when SEWS > 6. Treat withdrawal per pathway and reassess Q30-60 minutes.           Mild SEWS Score 1-6  Administer medications* (IV or PO; PO preferred):  If initial SEWS score: diazepam 10mg PO/IV x 1 AND phenobarbital 65 mg PO/IV x 1  If repeat SEWS score 1-6: phenobarbital 65 mg PO/IV q1 hour x 5 doses maximum   Reassessment:   SEWS q1 hour after each dose until SEWS 0 x 2 hours  VS q1 hours (until SEWS 0, then q4 hours)  Notify provider for bedside evaluation if 5-dose maximum is reached, RASS of -3 to -5, or SEWS score escalates to moderate or severe.   Moderate SEWS  Score 7-12  Administer medications* (IV):  If initial SEWS score: diazepam 10mg IV x 1 AND phenobarbital 260 mg IV x 1  If repeat SEWS score 7-12 or score escalated from mild: phenobarbital 130 mg IV q30 minutes x 5 doses maximum   Reassessment:  SEWS q30 minutes after each dose until SEWS < 7 (then hourly until SEWS 0 x 2 hours)  VS q30 minutes until SEWS < 7 (then hourly until SEWS 0, then q4 hours)  Notify provider for bedside evaluation if 5-dose maximum is reached, RASS of -3 to -5, or SEWS score escalates to severe.   Severe SEWS Score ? 13  Administer medications* (IV):  If initial SEWS score: Diazepam 10 mg IV x 1 AND phenobarbital 650 mg IV piggyback x 1 over 15-30 minutes  If repeat SEWS score ? 13 or score escalated from mild or moderate: phenobarbital 130 mg IV q30 minutes x 5 doses maximum   Reassessment:  SEWS q30 minutes after each dose until SEWS < 7 (then hourly until SEWS 0 x 2 hours)   VS q30 minutes until SEWS < 7 (then hourly until SEWS 0, then q4 hours)  Notify provider for bedside evaluation if 5-dose maximum is reached or RASS of -3 to -5   *Hold medications and notify provider if CNS depression, respirations < 10/min, or RASS of -3 to -5.         Medications to be administered adjunctively if more than 2 grams of phenobarbital is needed for stabilization of withdrawal; require attending approval.   Dexmedetomidine infusion 0.1-1mcg/kg/hr IV infusion, titratable to reduced agitation (Goal: RASS -2)  Ketamine   Acute agitated delirium: 1-2 mg/kg IV or 4-5 mg/kg IM  Refractory withdrawal: 0.1-1mg/kg/hr IV infusion, titratable to reduced agitation (Goal: RASS -2)    Further evaluation, screening and treatment:  Evaluate complete metabolic panel, transaminases, INR, and lipase. Assess hepatic ultrasound for any sign of alcoholic liver disease or cirrhosis, and ultimately refer for further hepatic evaluation and care as/if indicated.    Additional medications for ethanol associated  malnutrition:  Thiamine 100 mg IV daily, increase to 500 mg TID for signs/symptoms of Wernicke's Encephalopathy or Wernicke Korsakoff Syndrome   Folic acid 1 mg IV daily   Multivitamin PO daily      Will offer first monthly injection of Naltrexone 380 mg IM, once patient is stabilized, as it has been shown to assist in decreasing cravings for ethanol.     Evaluate and treat for coexisting substance use, such as opioids and nicotine. Discuss risk factors for infectious disease, such as history of intravenous drug abuse, and offer hepatitis and HIV screening if indicated.     Case management consultation to assist with coordination of subsequent treatment after discharge.          Hx and PE limited by: None    HPI: Dayton Daley is a 54 y.o. year old male with a past medical history of alcohol use disorder, hypertension, anxiety with depression who presented to the detox unit as a transfer from [facility] requesting alcohol detox.  Patient stated that he has had problem with his alcohol use dating as far back as 40 years ago.  Admits normally drinks about 750 mL of vodka with several bottles of beer daily.  Admits presented to [facility] ED after consuming about 750 mL of vodka, last use was 2 PM intoxicated requesting detox.  Patient was seen in the ED and had workup initiated with blood work positive for a serum alcohol level of 314.  Patient was asymptomatic and upon medical clearance, patient was transferred to the detox unit for further evaluation.    Patient stated that he was last admitted here at the detox unit between 04/24 to 04/26.  Admits to following a successful alcohol detoxification, was unable to get inpatient placement to [facility] as a result, was discharged with outpatient resources.  Admits received a Vivitrol shot prior to discharge but immediately relapsed and since then, has been consuming excessive amount of alcohol.  Admits today, family intervened and brought him to the ED for evaluation.  Currently  "patient is denying any history of withdrawal seizure/DT or any withdrawal symptoms currently.      Preferred alcoholic beverage(s): Vodka, beer  Quantity and frequency of alcohol intake: 750 mL vodka/bottles of beer; daily  Use of any ethanol substitutes (toxic alcohols): no  Date/Time of last alcohol intake: 2 PM, 05/27/2024  Current signs and symptoms of ethanol withdrawal: other none    SEWS Total Score: 0 (5/27/2024  8:37 PM)      Ethanol Withdrawal History  Previous ethanol withdrawal? yes  Prior inpatient treatment for ethanol withdrawal? yes  Prior outpatient treatment for ethanol withdrawal? yes  History of seizures with prior ethanol withdrawal? no  Prior treatment with naltrexone (Vivitrol)? yes  Current treatment with naltrexone (Vivitrol)? yes  Other current treatment for ethanol use disorder? no  Co-existing substance use? no    Review of PDMP: yes     Social History     Substance and Sexual Activity   Alcohol Use Yes    Alcohol/week: 32.0 standard drinks of alcohol    Types: 12 Cans of beer, 20 Shots of liquor per week     Social History     Substance and Sexual Activity   Drug Use Not Currently    Types: Cocaine, \"Crack\" cocaine, Methamphetamines     Social History     Tobacco Use   Smoking Status Never   Smokeless Tobacco Never       Review of Systems   Constitutional:  Negative for chills and fever.   HENT:  Negative for ear pain and sore throat.    Eyes:  Negative for pain and visual disturbance.   Respiratory:  Negative for cough and shortness of breath.    Cardiovascular:  Negative for chest pain and palpitations.   Gastrointestinal:  Negative for abdominal pain and vomiting.   Genitourinary:  Negative for dysuria and hematuria.   Musculoskeletal:  Negative for arthralgias and back pain.   Skin:  Negative for color change and rash.   Neurological:  Negative for tremors, seizures and syncope.   Psychiatric/Behavioral:  Negative for agitation. The patient is not nervous/anxious.    All other " systems reviewed and are negative.      Historical Information   Past Medical History:   Diagnosis Date    Depression     ETOH abuse     Fatty liver     Hypertension      Past Surgical History:   Procedure Laterality Date    ADENOIDECTOMY      TONSILLECTOMY       History reviewed. No pertinent family history.  Social History   Marital Status: Legally    Occupation: Unemployed  Patient Pre-hospital Living Situation: Family  Patient Pre-hospital Level of Mobility: Independent mobility  Patient Pre-hospital Diet Restrictions: None    No Known Allergies    Prior to Admission medications    Medication Sig Start Date End Date Taking? Authorizing Provider   OXcarbazepine (TRILEPTAL) 600 mg tablet Take 1 tablet (600 mg total) by mouth every 12 (twelve) hours 4/26/24  Yes Yadiel Pathak DO   thiamine 100 MG tablet Take 1 tablet (100 mg total) by mouth daily 4/27/24   Yadiel Pathak DO       Current Facility-Administered Medications   Medication Dose Route Frequency    acetaminophen (TYLENOL) tablet 650 mg  650 mg Oral Q6H PRN    [START ON 5/28/2024] enoxaparin (LOVENOX) subcutaneous injection 40 mg  40 mg Subcutaneous Daily    [START ON 5/28/2024] folic acid 1 mg, thiamine (VITAMIN B1) 100 mg in sodium chloride 0.9 % 100 mL IV piggyback   Intravenous Daily    [START ON 5/28/2024] multivitamin-minerals (CENTRUM) tablet 1 tablet  1 tablet Oral Daily    ondansetron (ZOFRAN) injection 4 mg  4 mg Intravenous Q6H PRN    [START ON 5/28/2024] OXcarbazepine (TRILEPTAL) tablet 600 mg  600 mg Oral Q12H DOROTA    sodium chloride 0.9 % infusion  125 mL/hr Intravenous Continuous    traZODone (DESYREL) tablet 50 mg  50 mg Oral HS PRN       Continuous Infusions:sodium chloride, 125 mL/hr             Objective     No intake or output data in the 24 hours ending 05/27/24 2141    Invasive Devices:   Peripheral IV 05/27/24 Proximal;Right;Ventral (anterior) Forearm (Active)   Site Assessment WDL 05/27/24 1523   Line Status Blood return  noted;Flushed 05/27/24 1523       Vitals   Vitals:    05/27/24 2037   BP: 143/87   TempSrc: Temporal   Pulse: 75   Resp: 18   Patient Position - Orthostatic VS: Lying   Temp: 98.4 °F (36.9 °C)       Physical Exam  Vitals and nursing note reviewed.   Constitutional:       General: He is not in acute distress.     Appearance: He is not ill-appearing, toxic-appearing or diaphoretic.   HENT:      Head: Normocephalic and atraumatic.      Nose: No congestion or rhinorrhea.   Eyes:      General: No scleral icterus.        Right eye: No discharge.         Left eye: No discharge.      Extraocular Movements: Extraocular movements intact.      Conjunctiva/sclera: Conjunctivae normal.      Pupils: Pupils are equal, round, and reactive to light.   Cardiovascular:      Rate and Rhythm: Normal rate and regular rhythm.      Pulses: Normal pulses.      Heart sounds: Normal heart sounds. No murmur heard.  Pulmonary:      Effort: Pulmonary effort is normal.      Breath sounds: Normal breath sounds. No stridor. No wheezing.   Abdominal:      General: Bowel sounds are normal.      Tenderness: There is no abdominal tenderness.   Musculoskeletal:         General: No swelling or tenderness. Normal range of motion.      Cervical back: Normal range of motion and neck supple. No rigidity or tenderness.      Right lower leg: No edema.      Left lower leg: No edema.   Skin:     General: Skin is warm.      Capillary Refill: Capillary refill takes less than 2 seconds.      Findings: No bruising, erythema or lesion.   Neurological:      Mental Status: He is oriented to person, place, and time.      GCS: GCS eye subscore is 4. GCS verbal subscore is 5. GCS motor subscore is 6.      Cranial Nerves: Cranial nerves 2-12 are intact.      Sensory: Sensation is intact.      Motor: No tremor.      Coordination: Coordination is intact.      Gait: Gait is intact.   Psychiatric:         Attention and Perception: He does not perceive auditory or visual  "hallucinations.         Mood and Affect: Mood is not anxious.         Thought Content: Thought content does not include homicidal or suicidal ideation. Thought content does not include homicidal or suicidal plan.           Data:    EKG, Pathology, and Other Studies: I have personally reviewed pertinent reports.    EKG: Reviewed    Lab Results:  CBC ETOH     Lab Results   Component Value Date    WBC 5.65 05/27/2024    RBC 5.02 05/27/2024    HGB 16.4 05/27/2024    HCT 48.8 05/27/2024    MCV 97 05/27/2024    MCH 32.7 05/27/2024    MCHC 33.6 05/27/2024    RDW 14.5 05/27/2024     05/27/2024    MPV 10.2 05/27/2024      No results found for: \"LACTICACID\"   CMP UA         Component Value Date/Time    K 4.3 05/27/2024 1531    K 3.8 05/15/2023 0409     05/27/2024 1531     (H) 05/15/2023 0409    CO2 26 05/27/2024 1531    CO2 25 05/15/2023 0409    BUN 9 05/27/2024 1531    BUN 14 05/15/2023 0409    CREATININE 0.78 05/27/2024 1531    CREATININE 0.79 05/15/2023 0409         Component Value Date/Time    CALCIUM 9.4 05/27/2024 1531    CALCIUM 8.6 05/15/2023 0409    ALKPHOS 55 05/27/2024 1531    ALKPHOS 59 05/15/2023 0409    AST 38 05/27/2024 1531    AST 27 05/15/2023 0409    ALT 33 05/27/2024 1531    ALT 29 05/15/2023 0409      No results found for: \"CLARITYU\", \"COLORU\", \"SPECGRAV\", \"PHUR\", \"GLUCOSEU\", \"KETONESU\", \"BLOODU\", \"PROTEIN UA\", \"NITRITE\", \"BILIRUBINUR\", \"UROBILINOGEN\", \"LEUKOCYTESUR\", \"WBCUA\", \"RBCUA\", \"HYALINE\", \"BACTERIA\", \"EPIS\"     Liver Function Test: ASA     Lab Results   Component Value Date    TBILI 0.28 05/27/2024    TBILI 0.3 05/15/2023    ALKPHOS 55 05/27/2024    ALKPHOS 59 05/15/2023    AST 38 05/27/2024    AST 27 05/15/2023    ALT 33 05/27/2024    ALT 29 05/15/2023    TP 7.9 05/27/2024    TP 6.6 05/15/2023    ALB 4.9 05/27/2024    ALB 4.0 05/15/2023      Lab Results   Component Value Date    SALICYLATE <5 05/27/2024      Troponin APAP     Lab Results   Component Value Date    TROPONINI <0.02 " "08/30/2021      Lab Results   Component Value Date    ACTMNPHEN <2 (L) 05/27/2024      VBG HCG     No results found for: \"PHVEN\", \"XYW3JKF\", \"PO2VEN\", \"FUP3JIJ\", \"BEVEN\", \"E4POUTOFC\", \"S3LAVBS\"   No results found for: \"HCGQUANT\"   ABG Urine Drug Screen     No results found for: \"PHART\", \"ZXP7UXQ\", \"PO2ART\", \"LSL8HSS\", \"BEART\", \"A9FHMQRQP\", \"O2HGB\", \"SOURC\", \"MAUREEN\", \"VTAC\", \"ACRATE\", \"INSPIREDAIR\", \"PEEP\"   Lab Results   Component Value Date    AMPMETHUR Negative 08/30/2021    BARBTUR Negative 08/30/2021    BDZUR Negative 08/30/2021    COCAINEUR Negative 08/30/2021    METHADONEUR Negative 08/30/2021    OPIATEUR Negative 08/30/2021    PCPUR Negative 08/30/2021    THCUR Negative 08/30/2021    OXYCODONEUR Negative 08/30/2021      Lactate INR     No results found for: \"LACTICACID\"   Lab Results   Component Value Date    INR 0.88 05/27/2024      PTT Protime     Lab Results   Component Value Date/Time    PTT 31 05/27/2024 03:31 PM        Lab Results   Component Value Date/Time    PROTIME 12.6 05/27/2024 03:31 PM              Imaging Studies: I have personally reviewed pertinent reports.        Counseling / Coordination of Care  Total floor / unit time spent today 45 minutes. Greater than 50% of total time was spent with the patient and / or family counseling and / or coordination of care.       Minutes of critical care time 45  -Critical care time was exclusive of separately billable procedures and teaching time.   -Critical care was necessary to treat or prevent imminent or life-threatening deterioration of the following condition: CNS failure/compromise, toxidrome (ethanol withdrawal),  withdrawal  -Critical care time was spent personally by me on the following activities as well as the above as per the course and rest of chart: obtaining history from patient/surrogate, development of a treatment plan, discussions with referring provider(s), evaluation of patient's response to the treatment, examination of the patient, " performing treatments and interventions, re-evaluation of the patient's condition, review of old charts, ordering/interpreting laboratory studies, ordering/interpreting of radiographic studies.      ** Please Note: This note has been constructed using a voice recognition system. **

## 2024-05-28 NOTE — ASSESSMENT & PLAN NOTE
Pt with a chronic alcohol use for the past 40 years   Last admitted to detox unit 04/24/24 - 04/26/24  Failed to get into inpatient Rehab at [facility] during last admission, discharged with outpatient resources  Received Vivitrol shot prior to discharge; relapsed immediately upon discharge.   Prior inpatient treatment hx  No prior h/o withdrawal seizure or DT  Withdrawal management as above  Initiate IVFs, IV thiamine, folic acid, and MVI  Consult case management/CRS for assistance with aftercare resources - pt interested in inpatient at [facility].

## 2024-05-28 NOTE — ASSESSMENT & PLAN NOTE
Patient endorses a h/o chronic anxiety and depression  Denies any SI, HI or AVH  Home medications include: Trileptal 600 mg BID  Continue home medication regimen  Recommend OP f/u with PCP/OP Psychiatry

## 2024-05-28 NOTE — ASSESSMENT & PLAN NOTE
Mag 1.7 on admission  K 3.4  Repleted with IV magnesium sulfate 2 g  Continue monitoring and replete electrolytes as indicated

## 2024-05-28 NOTE — PROGRESS NOTES
Progress Note - Medical Toxicology    Dayton Daley 54 y.o. male MRN: [id number]  Unit/Bed#: 5T DETOX 506-01 Encounter: [id number]  MEDICAL DETOX UNIT, LEVEL 4  Department of Medical Toxicology  Reason for Admission/Principal Problem: alcohol withdrawal  Rounding Provider: Ilene Dominguez PA-C, Reggie Ambrose, DO         * Alcohol withdrawal syndrome with complication (HCC)  Assessment & Plan  H/o chronic daily alcohol consumption, denies h/o withdrawal seizures  Last drink: 5/27/24 @ 2pm  Ethanol lvl: 314  Follow SEWS protocol with symptom-triggered phenobarbital for medically-assisted alcohol withdrawal  Currently not endorsing withdrawal symptoms  Will discontinue SEWS protocol this afternoon.   Telemetry and pulse oximetry monitoring   Continue to monitor vitals, symptoms    Alcohol use disorder, severe, dependence (HCC)  Assessment & Plan  Pt with a chronic alcohol use for the past 40 years   Last admitted to detox unit 04/24/24 - 04/26/24  Failed to get into inpatient Rehab at [facility] during last admission, discharged with outpatient resources  Received Vivitrol shot prior to discharge; relapsed immediately upon discharge.   Prior inpatient treatment hx  No prior h/o withdrawal seizure or DT  Withdrawal management as above  Initiate IVFs, IV thiamine, folic acid, and MVI  Consult case management/CRS for assistance with aftercare resources - pt interested in inpatient at [facility].         Hypokalemia  Assessment & Plan  K+ 3.4   Replaced with KCL 40 mEq    Hypomagnesemia  Assessment & Plan  Mag 1.7 on admission  K 3.4  Repleted with IV magnesium sulfate 2 g  Continue monitoring and replete electrolytes as indicated     HTN (hypertension)  Assessment & Plan  Vitals:    05/27/24 2037   BP: 143/87   Pulse: 75   Resp: 18   Temp: 98.4 °F (36.9 °C)   SpO2: 99%       Hx of hypertension  Not on any current medication  Continue to monitor BP   Will initiate anti-hypertensive if patient BP continues to  be elevated despite adequate withdrawal management     Anxiety and depression  Assessment & Plan  Patient endorses a h/o chronic anxiety and depression  Denies any SI, HI or AVH  Home medications include: Trileptal 600 mg BID  Continue home medication regimen  Recommend OP f/u with PCP/OP Psychiatry             VTE Pharmacologic Prophylaxis:   Pharmacologic: Patient has refused VTE prophylaxis  Mechanical VTE Prophylaxis in Place: yes    Code Status: Level 1 - Full Code    Patient Centered Rounds: I have performed bedside rounds with nursing staff today.    Discussions with Specialists or Other Care Team Provider: KIAH    Education and Discussions with Family / Patient: I personally answered all of the patient's questions to the best of my ability     Time Spent for Care: 30 minutes.  More than 50% of total time spent on counseling and coordination of care as described above.    Current Length of Stay: 1 day(s)    Current Patient Status: Inpatient     Certification Statement: The patient will continue to require additional inpatient hospital stay due to requiring IV electrolyte replacement  Discharge Plan: Anticipated Discharge within 24-48 hours         Subjective:   Patient evaluated at bedside. Denies any withdrawal symptoms. States he is interested in pursing inpatient drug and alcohol rehab upon discharge.     Objective:     Clinical Opiate Withdrawal Scale  Pulse: 72    SEWS Total Score: 0 (5/28/2024  4:00 AM)        Last 24 Hours Medication List:   Current Facility-Administered Medications   Medication Dose Route Frequency Provider Last Rate    acetaminophen  650 mg Oral Q6H PRN Ron Mcleod PA-C      enoxaparin  40 mg Subcutaneous Daily Ron Mcleod PA-C      folic acid 1 mg, thiamine (VITAMIN B1) 100 mg in sodium chloride 0.9 % 100 mL IV piggyback   Intravenous Daily Ron Mcleod PA-C      magnesium sulfate  2 g Intravenous Once Ilene Pauline Dominguez PA-C       multivitamin-minerals  1 tablet Oral Daily Ron Soto MIGUEL Mcleod      ondansetron  4 mg Intravenous Q6H PRN Ron Soto MIGUEL Mcleod      OXcarbazepine  600 mg Oral Q12H DOROTA Ron Brittany Mcleod PA-C      potassium chloride  40 mEq Oral Once Ilene Carpenter MIGUEL Dominguez      sodium chloride  125 mL/hr Intravenous Continuous Ron Soto MIGUEL Mcleod      traZODone  50 mg Oral HS PRN Ron Soto MIGUEL Mcleod           Vitals:   Temp (24hrs), Av.4 °F (36.9 °C), Min:97.7 °F (36.5 °C), Max:99.2 °F (37.3 °C)    Temp:  [97.7 °F (36.5 °C)-99.2 °F (37.3 °C)] 97.7 °F (36.5 °C)  HR:  [] 72  Resp:  [17-20] 18  BP: (111-146)/(64-87) 130/71  SpO2:  [95 %-99 %] 97 %  Body mass index is 26.61 kg/m².     Input and Output Summary (last 24 hours):No intake or output data in the 24 hours ending 24 0828    Physical Exam:   Physical Exam  Vitals and nursing note reviewed.   Constitutional:       General: He is not in acute distress.     Appearance: He is not diaphoretic.   Cardiovascular:      Rate and Rhythm: Normal rate and regular rhythm.   Pulmonary:      Breath sounds: Normal breath sounds.   Neurological:      Mental Status: He is alert and oriented to person, place, and time.   Psychiatric:         Mood and Affect: Mood is not anxious or depressed.         Additional Data:     Labs: keep all most recent labs as listed on admission templates   Results from last 7 days   Lab Units 24  0445 24  1531   WBC Thousand/uL 4.57 5.65   HEMOGLOBIN g/dL 14.8 16.4   HEMATOCRIT % 41.7 48.8   PLATELETS Thousands/uL 178 190   SEGS PCT %  --  53   LYMPHO PCT %  --  29   MONO PCT %  --  16*   EOS PCT %  --  1      Results from last 7 days   Lab Units 24  0445   SODIUM mmol/L 141   POTASSIUM mmol/L 3.4*   CHLORIDE mmol/L 105   CO2 mmol/L 25   BUN mg/dL 11   CREATININE mg/dL 0.78   ANION GAP mmol/L 11   CALCIUM mg/dL 8.9   ALBUMIN g/dL 3.9   TOTAL BILIRUBIN mg/dL 0.35   ALK PHOS U/L 44   ALT  U/L 39   AST U/L 55*   GLUCOSE RANDOM mg/dL 113      Results from last 7 days   Lab Units 05/27/24  1531   INR  0.88                         * I Have Reviewed All Lab Data Listed Above.  * Additional Pertinent Lab Tests Reviewed: All Labs For Current Hospital Admission Reviewed      Imaging Studies: I have personally reviewed pertinent reports.        Recent Cultures (last 7 days):          Today, Patient Was Seen By: Ilene Dominguez PA-C    ** Please Note: Dictation voice to text software may have been used in the creation of this document. **

## 2024-05-29 PROBLEM — F10.939 ALCOHOL WITHDRAWAL SYNDROME WITH COMPLICATION (HCC): Status: RESOLVED | Noted: 2024-04-24 | Resolved: 2024-05-29

## 2024-05-29 LAB
ALBUMIN SERPL BCP-MCNC: 4.3 G/DL (ref 3.5–5)
ALP SERPL-CCNC: 58 U/L (ref 34–104)
ALT SERPL W P-5'-P-CCNC: 39 U/L (ref 7–52)
ANION GAP SERPL CALCULATED.3IONS-SCNC: 8 MMOL/L (ref 4–13)
AST SERPL W P-5'-P-CCNC: 34 U/L (ref 13–39)
BILIRUB SERPL-MCNC: 0.58 MG/DL (ref 0.2–1)
BUN SERPL-MCNC: 10 MG/DL (ref 5–25)
CALCIUM SERPL-MCNC: 9.3 MG/DL (ref 8.4–10.2)
CHLORIDE SERPL-SCNC: 103 MMOL/L (ref 96–108)
CO2 SERPL-SCNC: 25 MMOL/L (ref 21–32)
CREAT SERPL-MCNC: 0.86 MG/DL (ref 0.6–1.3)
ERYTHROCYTE [DISTWIDTH] IN BLOOD BY AUTOMATED COUNT: 13.8 % (ref 11.6–15.1)
GFR SERPL CREATININE-BSD FRML MDRD: 96 ML/MIN/1.73SQ M
GLUCOSE SERPL-MCNC: 110 MG/DL (ref 65–140)
HCT VFR BLD AUTO: 46.2 % (ref 36.5–49.3)
HGB BLD-MCNC: 16 G/DL (ref 12–17)
MAGNESIUM SERPL-MCNC: 2 MG/DL (ref 1.9–2.7)
MCH RBC QN AUTO: 33.6 PG (ref 26.8–34.3)
MCHC RBC AUTO-ENTMCNC: 34.6 G/DL (ref 31.4–37.4)
MCV RBC AUTO: 97 FL (ref 82–98)
PLATELET # BLD AUTO: 172 THOUSANDS/UL (ref 149–390)
PMV BLD AUTO: 10.3 FL (ref 8.9–12.7)
POTASSIUM SERPL-SCNC: 4.1 MMOL/L (ref 3.5–5.3)
PROT SERPL-MCNC: 7.2 G/DL (ref 6.4–8.4)
RBC # BLD AUTO: 4.76 MILLION/UL (ref 3.88–5.62)
SODIUM SERPL-SCNC: 136 MMOL/L (ref 135–147)
WBC # BLD AUTO: 4.88 THOUSAND/UL (ref 4.31–10.16)

## 2024-05-29 PROCEDURE — 80053 COMPREHEN METABOLIC PANEL: CPT | Performed by: EMERGENCY MEDICINE

## 2024-05-29 PROCEDURE — 85027 COMPLETE CBC AUTOMATED: CPT | Performed by: EMERGENCY MEDICINE

## 2024-05-29 PROCEDURE — 83735 ASSAY OF MAGNESIUM: CPT | Performed by: EMERGENCY MEDICINE

## 2024-05-29 RX ADMIN — FOLIC ACID 1 MG: 1 TABLET ORAL at 08:57

## 2024-05-29 RX ADMIN — OXCARBAZEPINE 600 MG: 300 TABLET, FILM COATED ORAL at 08:57

## 2024-05-29 RX ADMIN — THIAMINE HCL TAB 100 MG 100 MG: 100 TAB at 08:57

## 2024-05-29 RX ADMIN — MULTIPLE VITAMINS W/ MINERALS TAB 1 TABLET: TAB ORAL at 08:57

## 2024-05-29 RX ADMIN — OXCARBAZEPINE 600 MG: 300 TABLET, FILM COATED ORAL at 20:47

## 2024-05-29 NOTE — H&P
MEDICAL DETOX UNIT, LEVEL 4  Department of Medical Toxicology  Reason for Admission/Principal Problem: Ethanol withdrawal, Ethanol use disorder   Admitting provider: MIGUEL Juarez DO   5/27/2024  8:17 PM       Discharging Physician / Practitioner: Nury Cuadra PA-C  PCP: Yadiel Lu MD  Admission Date:   Admission Orders (From admission, onward)       Ordered        05/27/24 2047  Inpatient Admission  Once                          Discharge Date: 5/30/2024    Medical Problems       Resolved Problems  Date Reviewed: 5/27/2024            Resolved    * (Principal) Alcohol withdrawal syndrome with complication (HCC) 5/29/2024     Resolved by  Nury Cuadra PA-C    Hypomagnesemia 5/30/2024     Resolved by  Nury Cuadra PA-C    Hypokalemia 5/30/2024     Resolved by  Nury Cuadra PA-C          Alcohol use disorder, severe, dependence (HCC)  Assessment & Plan  Pt with a chronic alcohol use for the past 40 years   Last admitted to detox unit 04/24/24 - 04/26/24  Prior inpatient treatment hx  No prior h/o withdrawal seizure or DT  Vitamin supplementation p.o.   Case management/CRS on board - patient accepted to [facility] on 5/30 and remains stable for discharge     * Alcohol withdrawal syndrome with complication (HCC)-resolved as of 5/29/2024  Assessment & Plan  H/o chronic daily alcohol consumption, denies h/o withdrawal seizures  Last drink: 5/27/24 @ 2pm  Ethanol lvl: 314  Follow SEWS protocol with symptom-triggered phenobarbital for medically-assisted alcohol withdrawal  Currently not endorsing withdrawal symptoms  SEWS protocol was discontinued on 5/28- patient did not receive any phenobarbital during admission  Telemetry and pulse oximetry monitoring   Continue to monitor vitals, symptoms    HTN (hypertension)  Assessment & Plan  Vitals:    05/29/24 0714   BP: 134/70   Pulse: 67   Resp: 18   Temp: 97.5 °F (36.4 °C)   SpO2: 96%        Hx of hypertension  Not on any current medication  Continue to monitor BP   Will defer to outpatient follow-up of blood pressure elevation, since mild and just finished withdrawing from alcohol.    Anxiety and depression  Assessment & Plan  Patient endorses a h/o chronic anxiety and depression  Denies any SI, HI or AVH  Home medications include: Trileptal 600 mg BID  Continue home medication regimen  Recommend OP f/u with PCP/OP Psychiatry     Hypokalemia-resolved as of 5/30/2024  Assessment & Plan  K+ 3.4 on 5/28 - repleted  Resolved with potassium of 4.1    Hypomagnesemia-resolved as of 5/30/2024  Assessment & Plan  Mag 1.7 on admission  K 3.4  Repleted with IV magnesium sulfate 2 g  Resolved with mag of 2.0        Consultations During Hospital Stay:  Case management     Procedures Performed:   None    Significant Findings / Test Results:   Hypokalemia  Hypomagnesemia     Incidental Findings:   N/A    Test Results Pending at Discharge (will require follow up):   N/A     Outpatient Tests Requested:  Follow up with your PCP and Psychiatrist within the next week    Complications:  None    Reason for Admission: Ethanol use disorder, Ethanol withdrawal syndrome    Hospital Course:     Dayton Daley is a 54 y.o. male patient who originally presented to the hospital on 5/27/2024 requesting detoxification for alcohol. He admits to drinking 12 beers and 750mls of vodka daily for a very long time. He told staff that he had a bed waiting for him at [facility] and needed to be cleared medically first. He was then transferred and admitted to [facility] inpatient detox unit where he was managed under [facility] protocol where he did not require any phenobarbital during his admission. He had minor electrolyte abnormalities which were repleted and resolved. He was taken off SEWS due to lack of withdrawal symptoms and remained stable for discharge to [facility] on 5/30/2024.    Please see above list of diagnoses and  "related plan for additional information.     Condition at Discharge: good     Discharge Day Visit / Exam:     Subjective:  Patient was seen on rounds this morning. Denies any complaints. He was accepted to [facility] and will be discharged to rehab today.     Vitals: Blood Pressure: 130/78 (05/29/24 1858)  Pulse: 64 (05/29/24 1858)  Temperature: 98.1 °F (36.7 °C) (05/29/24 1858)  Temp Source: Temporal (05/29/24 1858)  Respirations: 18 (05/29/24 1858)  Height: 5' 8\" (172.7 cm) (05/27/24 2037)  Weight - Scale: 79.4 kg (175 lb) (05/27/24 2037)  SpO2: 96 % (05/29/24 1858)  Exam:   Physical Exam  Constitutional:       General: He is not in acute distress.     Appearance: Normal appearance.   HENT:      Head: Normocephalic.   Eyes:      Extraocular Movements: Extraocular movements intact.      Conjunctiva/sclera: Conjunctivae normal.      Pupils: Pupils are equal, round, and reactive to light.   Cardiovascular:      Rate and Rhythm: Normal rate and regular rhythm.      Heart sounds: Normal heart sounds.   Pulmonary:      Effort: Pulmonary effort is normal.      Breath sounds: Normal breath sounds.   Abdominal:      General: Abdomen is flat. There is no distension.      Palpations: Abdomen is soft.      Tenderness: There is no abdominal tenderness.   Musculoskeletal:         General: Normal range of motion.      Cervical back: Normal range of motion and neck supple.   Skin:     General: Skin is warm and dry.      Capillary Refill: Capillary refill takes less than 2 seconds.   Neurological:      General: No focal deficit present.      Mental Status: He is alert and oriented to person, place, and time. Mental status is at baseline.   Psychiatric:         Mood and Affect: Mood normal.         Behavior: Behavior normal.         Thought Content: Thought content normal.         Judgment: Judgment normal.       Discussion with Family: No, discussed with patient    Discharge instructions/Information to patient and family:   See " after visit summary for information provided to patient and family.      Provisions for Follow-Up Care:  See after visit summary for information related to follow-up care and any pertinent home health orders.      Disposition:     Acute Rehab at [facility]     For Discharges to [facility]:   Not Applicable to this Patient - Not Applicable to this Patient    Planned Readmission: No     Discharge Statement:  I spent 45 minutes discharging the patient. This time was spent on the day of discharge. I had direct contact with the patient on the day of discharge. Greater than 50% of the total time was spent examining patient, answering all patient questions, arranging and discussing plan of care with patient as well as directly providing post-discharge instructions.  Additional time then spent on discharge activities.    Discharge Medications:  See after visit summary for reconciled discharge medications provided to patient and family.      ** Please Note: This note has been constructed using a voice recognition system **

## 2024-05-29 NOTE — SOCIAL WORK
Cm met with pt to do phone screen with [facility]. [facility] will call back after review and regarding bed availability.

## 2024-05-29 NOTE — SOCIAL WORK
Pt accepted to [facility] for inpatient FRANKIE tx with bed available tomorrow 5/30/24. [facility] will provide transportation. CM made pt aware.

## 2024-05-29 NOTE — ASSESSMENT & PLAN NOTE
Vitals:    05/29/24 0714   BP: 134/70   Pulse: 67   Resp: 18   Temp: 97.5 °F (36.4 °C)   SpO2: 96%       Hx of hypertension  Not on any current medication  Continue to monitor BP   Will defer to outpatient follow-up of blood pressure elevation, since mild and just finished withdrawing from alcohol.

## 2024-05-29 NOTE — ASSESSMENT & PLAN NOTE
H/o chronic daily alcohol consumption, denies h/o withdrawal seizures  Last drink: 5/27/24 @ 2pm  Ethanol lvl: 314  Follow SEWS protocol with symptom-triggered phenobarbital for medically-assisted alcohol withdrawal  Currently not endorsing withdrawal symptoms  SEWS protocol was discontinued on 5/28- patient did not receive any phenobarbital during admission  Telemetry and pulse oximetry monitoring   Continue to monitor vitals, symptoms   Additional Anesthesia Volume In Cc: 6

## 2024-05-29 NOTE — PROGRESS NOTES
PROGRESS NOTE  DEPARTMENT OF MEDICAL TOXICOLOGY  LEVEL 4 MEDICAL DETOX UNIT  Dayton Daley 54 y.o. male MRN: [id number]  Unit/Bed#: [address] [place] [address] 506-01 Encounter: [id number]      Reason for Admission/Principal Problem: Alcohol withdrawal  Rounding Provider: Reggie Ambrose DO  Attending Provider: Reggie Ambrose DO   5/27/2024  8:17 PM           Hypokalemia  Assessment & Plan  K+ 3.4   Replaced with KCL 40 mEq  Monitor and replace as necessary     Hypomagnesemia  Assessment & Plan  Mag 1.7 on admission  K 3.4  Repleted with IV magnesium sulfate 2 g  Continue monitoring and replete electrolytes as indicated     HTN (hypertension)  Assessment & Plan  Vitals:    05/29/24 0714   BP: 134/70   Pulse: 67   Resp: 18   Temp: 97.5 °F (36.4 °C)   SpO2: 96%       Hx of hypertension  Not on any current medication  Continue to monitor BP   Will defer to outpatient follow-up of blood pressure elevation, since mild and just finished withdrawing from alcohol.    Anxiety and depression  Assessment & Plan  Patient endorses a h/o chronic anxiety and depression  Denies any SI, HI or AVH  Home medications include: Trileptal 600 mg BID  Continue home medication regimen  Recommend OP f/u with PCP/OP Psychiatry     Alcohol use disorder, severe, dependence (HCC)  Assessment & Plan  Pt with a chronic alcohol use for the past 40 years   Last admitted to detox unit 04/24/24 - 04/26/24  Prior inpatient treatment hx  No prior h/o withdrawal seizure or DT  Vitamin supplementation p.o.   Case management/CRS coordinating transition to [facility] which will likely take 4 to 5 days.  Patient is determining with family if he is allowed to go home.  He will be allowed to stay at detox until rehab placement if he wishes.     * Alcohol withdrawal syndrome with complication (HCC)-resolved as of 5/29/2024  Assessment & Plan  H/o chronic daily alcohol consumption, denies h/o withdrawal seizures  Last drink: 5/27/24 @ 2pm  Ethanol lvl: 314  Follow SEWS  protocol with symptom-triggered phenobarbital for medically-assisted alcohol withdrawal  Currently not endorsing withdrawal symptoms  SEWS protocol was discontinued on 5/28- patient did not receive any phenobarbital during admission  Telemetry and pulse oximetry monitoring   Continue to monitor vitals, symptoms            VTE Pharmacologic Prophylaxis:   Pharmacologic: Enoxaparin (Lovenox)  Mechanical VTE Prophylaxis in Place: no    Code Status: Level 1 - Full Code    Patient Centered Rounds: I have performed bedside rounds with nursing staff today.    Discussions with Specialists or Other Care Team Provider: Case management and nursing    Education and Discussions with Family / Patient: Patient    Time Spent for Care: 20 minutes.  More than 50% of total time spent on counseling and coordination of care as described above.    Current Length of Stay: 2 day(s)    Current Patient Status: Inpatient     Certification Statement: The patient will continue to require additional inpatient hospital stay due to awaiting placement  Discharge Plan: Patient may be discharged upon coordination of care      Subjective:   Patient is feeling well today and is trying to arrange transition to rehab.    Objective:     Clinical Opiate Withdrawal Scale  Pulse: 67    SEWS Total Score: 0 (5/28/2024  8:00 AM)        Last 24 Hours Medication List:   Current Facility-Administered Medications   Medication Dose Route Frequency Provider Last Rate    acetaminophen  650 mg Oral Q6H PRN Ron Mcleod PA-C      enoxaparin  40 mg Subcutaneous Daily Ron Mcleod PA-C      folic acid  1 mg Oral Daily Ilene Dominguez PA-C      multivitamin-minerals  1 tablet Oral Daily Ron Mcleod PA-C      ondansetron  4 mg Intravenous Q6H PRN Ron Mcleod PA-C      OXcarbazepine  600 mg Oral Q12H DOROTA Ron Mcleod PA-C      thiamine  100 mg Oral Daily Ilene Dominguez PA-C      traZODone  50 mg  Oral HS PRN Obi[place] Brittany Mcleod PA-C           Vitals:   Temp (24hrs), Av.7 °F (36.5 °C), Min:97.4 °F (36.3 °C), Max:98.1 °F (36.7 °C)    Temp:  [97.4 °F (36.3 °C)-98.1 °F (36.7 °C)] 97.5 °F (36.4 °C)  HR:  [64-80] 67  Resp:  [18] 18  BP: (130-137)/(70-81) 134/70  SpO2:  [96 %-97 %] 96 %  Body mass index is 26.61 kg/m².     Input and Output Summary (last 24 hours):  Intake/Output Summary (Last 24 hours) at 2024 1018  Last data filed at 2024 0830  Gross per 24 hour   Intake 240 ml   Output --   Net 240 ml       Physical Exam:   Physical Exam  Vitals reviewed.   Constitutional:       General: He is not in acute distress.     Appearance: Normal appearance.   HENT:      Head: Normocephalic.      Nose: Nose normal.      Mouth/Throat:      Mouth: Mucous membranes are moist.   Eyes:      Pupils: Pupils are equal, round, and reactive to light.   Cardiovascular:      Rate and Rhythm: Normal rate.   Pulmonary:      Effort: Pulmonary effort is normal.   Abdominal:      General: Abdomen is flat.   Musculoskeletal:         General: Normal range of motion.      Cervical back: Normal range of motion.   Skin:     General: Skin is dry.   Neurological:      General: No focal deficit present.      Mental Status: He is alert and oriented to person, place, and time.   Psychiatric:         Mood and Affect: Mood normal.         Behavior: Behavior normal.         Thought Content: Thought content normal.         Additional Data:     Labs:   Results from last 7 days   Lab Units 24  0445 24  1531   WBC Thousand/uL 4.57 5.65   HEMOGLOBIN g/dL 14.8 16.4   HEMATOCRIT % 41.7 48.8   PLATELETS Thousands/uL 178 190   SEGS PCT %  --  53   LYMPHO PCT %  --  29   MONO PCT %  --  16*   EOS PCT %  --  1      Results from last 7 days   Lab Units 24  0445   SODIUM mmol/L 141   POTASSIUM mmol/L 3.4*   CHLORIDE mmol/L 105   CO2 mmol/L 25   BUN mg/dL 11   CREATININE mg/dL 0.78   ANION GAP mmol/L 11   CALCIUM mg/dL 8.9    ALBUMIN g/dL 3.9   TOTAL BILIRUBIN mg/dL 0.35   ALK PHOS U/L 44   ALT U/L 39   AST U/L 55*   GLUCOSE RANDOM mg/dL 113      Results from last 7 days   Lab Units 05/27/24  1531   INR  0.88                         * I Have Reviewed All Lab Data Listed Above.  * Additional Pertinent Lab Tests Reviewed: All Labs For Current Hospital Admission Reviewed      Imaging Studies: I have personally reviewed pertinent reports.          Today, Patient Was Seen By: Reggie Ambrose DO    ** Please Note: Dictation voice to text software may have been used in the creation of this document. **

## 2024-05-30 VITALS
WEIGHT: 175 LBS | BODY MASS INDEX: 26.52 KG/M2 | HEART RATE: 72 BPM | TEMPERATURE: 98.3 F | SYSTOLIC BLOOD PRESSURE: 124 MMHG | HEIGHT: 68 IN | RESPIRATION RATE: 18 BRPM | OXYGEN SATURATION: 98 % | DIASTOLIC BLOOD PRESSURE: 76 MMHG

## 2024-05-30 PROBLEM — R03.0 ELEVATED BLOOD PRESSURE READING: Status: ACTIVE | Noted: 2024-04-24

## 2024-05-30 PROBLEM — R03.0 ELEVATED BLOOD PRESSURE READING: Status: RESOLVED | Noted: 2024-04-24 | Resolved: 2024-05-30

## 2024-05-30 PROBLEM — E87.6 HYPOKALEMIA: Status: RESOLVED | Noted: 2024-05-28 | Resolved: 2024-05-30

## 2024-05-30 PROBLEM — E83.42 HYPOMAGNESEMIA: Status: RESOLVED | Noted: 2024-04-25 | Resolved: 2024-05-30

## 2024-05-30 PROCEDURE — 99238 HOSP IP/OBS DSCHRG MGMT 30/<: CPT | Performed by: EMERGENCY MEDICINE

## 2024-05-30 RX ORDER — OXCARBAZEPINE 600 MG/1
600 TABLET, FILM COATED ORAL EVERY 12 HOURS SCHEDULED
Qty: 60 TABLET | Refills: 0 | Status: SHIPPED | OUTPATIENT
Start: 2024-05-30

## 2024-05-30 RX ADMIN — THIAMINE HCL TAB 100 MG 100 MG: 100 TAB at 08:32

## 2024-05-30 RX ADMIN — FOLIC ACID 1 MG: 1 TABLET ORAL at 08:32

## 2024-05-30 RX ADMIN — OXCARBAZEPINE 600 MG: 300 TABLET, FILM COATED ORAL at 08:31

## 2024-05-30 RX ADMIN — MULTIPLE VITAMINS W/ MINERALS TAB 1 TABLET: TAB ORAL at 08:32

## 2024-05-30 NOTE — PLAN OF CARE
Problem: SUBSTANCE USE/ABUSE  Goal: By discharge, will develop insight into their chemical dependency and sustain motivation to continue in recovery  Description: INTERVENTIONS:  - Attends all daily group sessions and scheduled AA groups  - Actively practices coping skills through participation in the therapeutic community and adherence to program rules  - Reviews and completes assignments from individual treatment plan  - Assist patient development of understanding of their personal cycle of addiction and relapse triggers  5/29/2024 2202 by Destiney Jennings RN  Outcome: Progressing  5/29/2024 2202 by Destiney Jennings RN  Outcome: Progressing  Goal: By discharge, patient will have ongoing treatment plan addressing chemical dependency  Description: INTERVENTIONS:  - Assist patient with resources and/or appointments for ongoing recovery based living  5/29/2024 2202 by Destiney Jennings RN  Outcome: Progressing  5/29/2024 2202 by Destiney Jennings RN  Outcome: Progressing

## 2024-05-30 NOTE — DISCHARGE SUMMARY
MEDICAL DETOX UNIT, LEVEL 4  Department of Medical Toxicology  Reason for Admission/Principal Problem: Alcohol withdrawal  Admitting provider: DO Reggie Dang DO   5/27/2024  8:17 PM       Discharging Physician / Practitioner: Reggie Ambrose DO  PCP: Yadiel Lu MD  Admission Date:   Admission Orders (From admission, onward)       Ordered        05/27/24 2047  Inpatient Admission  Once                          Discharge Date: 05/30/24    Medical Problems       Resolved Problems  Date Reviewed: 5/27/2024            Resolved    Alcohol withdrawal syndrome with complication (HCC) 5/29/2024     Resolved by  Reggie Ambrose DO    Elevated blood pressure reading 5/30/2024     Resolved by  Reggie Ambrose DO    Hypomagnesemia 5/30/2024     Resolved by  Nury Cuadra PA-C    Hypokalemia 5/30/2024     Resolved by  Nury Cuadra PA-C          Anxiety and depression  Assessment & Plan  Patient endorses a h/o chronic anxiety and depression  Denies any SI, HI or AVH  Home medications include: Trileptal 600 mg BID  Continue home medication regimen  Recommend OP f/u with PCP/OP Psychiatry     * Alcohol use disorder, severe, dependence (HCC)  Assessment & Plan  DC to rehab    Hypokalemia-resolved as of 5/30/2024  Assessment & Plan  K+ 3.4 on 5/28 - repleted  Resolved with potassium of 4.1    Hypomagnesemia-resolved as of 5/30/2024  Assessment & Plan  Mag 1.7 on admission  K 3.4  Repleted with IV magnesium sulfate 2 g  Resolved with mag of 2.0    Elevated blood pressure reading-resolved as of 5/30/2024  Assessment & Plan  Improved now that withdrawal has resolved.  Follow-up as outpatient    Alcohol withdrawal syndrome with complication (HCC)-resolved as of 5/29/2024  Assessment & Plan  H/o chronic daily alcohol consumption, denies h/o withdrawal seizures  Last drink: 5/27/24 @ 2pm  Ethanol lvl: 314  Follow SEWS protocol with symptom-triggered phenobarbital  "for medically-assisted alcohol withdrawal  Currently not endorsing withdrawal symptoms  SEWS protocol was discontinued on 5/28- patient did not receive any phenobarbital during admission  Telemetry and pulse oximetry monitoring   Continue to monitor vitals, symptoms        Consultations During Hospital Stay:  NA    Procedures Performed:   NA    Significant Findings / Test Results:   NA    Incidental Findings:   NA     Test Results Pending at Discharge (will require follow up):   NA     Outpatient Tests Requested:  NA    Complications:  NA    Reason for Admission: Alcohol withdrawal    Hospital Course:     Dayton Daley is a 54 y.o. male patient who originally presented to the hospital on 5/27/2024 due to alcohol withdrawal.  Patient was admitted for several days and his symptoms resolved after his first dose of benzodiazepines.  No phenobarbital was required.  Patient was eager for inpatient treatment and was discharged from detox to inpatient facility of his choice.  He was well-appearing and in good spirits upon discharge.        Please see above list of diagnoses and related plan for additional information.     Condition at Discharge: good     Discharge Day Visit / Exam:     Subjective: Patient feels much better and is eager for discharge.  Vitals: Blood Pressure: 124/76 (05/30/24 1130)  Pulse: 72 (05/30/24 1130)  Temperature: 98.3 °F (36.8 °C) (05/30/24 1130)  Temp Source: Temporal (05/30/24 1130)  Respirations: 18 (05/30/24 1130)  Height: 5' 8\" (172.7 cm) (05/27/24 2037)  Weight - Scale: 79.4 kg (175 lb) (05/27/24 2037)  SpO2: 98 % (05/30/24 1130)  Exam:   Physical Exam  Vitals and nursing note reviewed.   Constitutional:       Appearance: Normal appearance.   HENT:      Head: Normocephalic and atraumatic.      Mouth/Throat:      Mouth: Mucous membranes are moist.   Eyes:      Pupils: Pupils are equal, round, and reactive to light.   Cardiovascular:      Rate and Rhythm: Normal rate and regular rhythm. "   Pulmonary:      Effort: Pulmonary effort is normal.   Abdominal:      General: Abdomen is flat.   Musculoskeletal:         General: Normal range of motion.      Cervical back: Normal range of motion.   Skin:     General: Skin is warm and dry.   Neurological:      General: No focal deficit present.      Mental Status: He is alert and oriented to person, place, and time.      Comments: Ambulating well   Psychiatric:         Mood and Affect: Mood normal.       Discharge instructions/Information to patient and family:   See after visit summary for information provided to patient and family.      Provisions for Follow-Up Care:  See after visit summary for information related to follow-up care and any pertinent home health orders.      Disposition:     Home         Discharge Statement:  I spent 15 minutes discharging the patient. This time was spent on the day of discharge. I had direct contact with the patient on the day of discharge. Greater than 50% of the total time was spent examining patient, answering all patient questions, arranging and discussing plan of care with patient as well as directly providing post-discharge instructions.  Additional time then spent on discharge activities.    Discharge Medications:  See after visit summary for reconciled discharge medications provided to patient and family.      ** Please Note: This note has been constructed using a voice recognition system **

## 2024-05-30 NOTE — CERTIFIED RECOVERY SPECIALIST
"   Certified  Note    Patient name: Dayton Daley  Location: 5T DETOX 506/5T DETOX 50*  [place]: [facility]  Attending:  Reggie Ambrose DO MRN [id number]  : 1970  Age: 54 y.o.    Sex: male Date 2024         Substance Use History:     Social History     Substance and Sexual Activity   Alcohol Use Yes    Alcohol/week: 32.0 standard drinks of alcohol    Types: 12 Cans of beer, 20 Shots of liquor per week        Social History     Substance and Sexual Activity   Drug Use Not Currently    Types: Cocaine, \"Crack\" cocaine, Methamphetamines     Admission Information  Substances Used at This Admission:: Alcohol  Readmission in Last 30 Days?: No  Encounter Type:: Patient Face-to-Face    Recovery Support Plan  Declined All Services?: No  Agreeable to Warm Handoff?: Yes  Is Patient Accepting [facility] Services?: Yes  Facility Name:: Patient will be going to [facility] today at approx 330  Was Narcan Provided at Discharge?: No  Plan Discussed With Treatment Team:: Yes  Plan Discussed With:: Nurse    Referral to Recovery Supports:  Recovery Center:: No  Community Based CRS:: No  Case Management:: No  Direct Access to [facility] Treatment?: Yes  Resource Guide Given?: Yes  Follow Up With Patient:: No  Family / Other Support:: No  Referral for Community Physical Health:: No  Referral for Community Mental Health:: No'    Consult rcvd: Y  Patient seen in: [facility]  Stage of change:  action     Substance used: Alcohol   Frequency/quantity : Daily   Last use:     History of withdrawal seizures:no   Currently on MAT : No    CRS provided introductions and explanation of service. CRS and patient engaged in conversation of hospitalization. Patient discussed needs he feels would be appropriate. CRS shared understanding.     Patient is going into IP treatment at [facility]. Patient is optimistic about treatment and hoping this will help.       Will continue to follow until " discharge    Resources provided     -      Michelle Worley

## 2024-05-31 NOTE — PROGRESS NOTES
05/31/24 0831   Other Referral/Resources/Interventions Provided:   Financial Resources Provided Other (Comment)  ([facility] provided transportation to tx)   Referrals Provided: Crisis Hotline;Support Group   Post Acute Placement to: Substance Abuse Treatment  (Pt discharged to [facility] for inpt [facility] tx)   Discharge Communications   Discharge planning discussed with: pt   Freedom of Choice Yes   Transportation at Discharge? No  (pt transported to tx by [facility])   Transport at Discharge  Auto with designated    Transported by (Company and Unit #) [facility] staff   ETA of Transport 3pm   Transport Service Arrived Yes   Transfer Mode Ambulate   Accompanied by Alone   CM Handoff Comments pt discharged to [facility]; given resources for AA/NA; crisis     Cm was notified by medical provider that pt is medically cleared for discharge. Pt denies any withdrawal symptoms at this time. Pt to discharge to [facility] and facility staff will  upon discharge.     Discharge Address:   [address] [place] [address]    Phone Number:  [phone]

## 2024-06-03 NOTE — UTILIZATION REVIEW
URGENT/EMERGENT  INPATIENT/SPU AUTHORIZATION REQUEST    Date: 06/03/24        X New Request   Additional Information for PA#:     Office Contact Name:  Jose Alejandro White Title: Utilization Review, Registed Nurse     Phone: [phone]  Ext.     Availability (Date/Time): M-F 8-4    Type of Review:    Inpatient Review    Current    For Late Pick-up Cases:  How your facility was first notified of the Late Pick-up:   When your facility was first notified of the Late Pick-up (date):     Was the recipient a prisoner at the time of admission? no         RECIPIENT INFORMATION    Recipient ID#: [id number]       Recipient Name: Dayton Daley      YOB: 1970  54 y.o. Recipient Alias:     Gender: male      Medicaid Eligibility (HCB Package):          INSURANCE INFORMATION  (All other private or governmental health insurance benefits must be utilized prior to billing the [place] Program)    Was this admission the result of an MVA, other accident, assault, injury, fall, gunshot, bite etc.?  no   If yes, provide a brief description of the incident.      Does the recipient have other insurance coverage?  no  Insurance Company Name:    Policy #:  Did that insurance pay on this claim?    Yes  No     Did that insurance deny this claim?   Yes  No     If yes, reason for denial:      Does the recipient have Medicare?  no  Did Medicare exhaust prior to this admission?    Yes  No     Did Medicare partially pay this claim?   Yes  No     Did Medicare deny this claim?   Yes  No   If yes, reason for denial:      Was the recipient a prisoner at the time of admission?  no        PROVIDER INFORMATION    Hospital Name: [facility] Provider ID#: [id number]         Admitting Physician: [facility] Toxicology                       [facility] Provider ID#: [id number]        ADMISSION INFORMATION    Type of Admission: (please choose one)  Presented to [facility] ED 0 Transferred va EMS to [facility]  "unit .      Direct admission.      Admission Floor or Unit Type: Level 4 Medical Detox Unit    Dates/Times:        ED Date/Time: 5/27/2024  8:17 PM        Observation Date/Time:          IP Admission Date/Time: 5/27/24  8:47 PM        Discharge or Transfer Date/Time: 5/30/2024  3:27 PM        DIAGNOSIS/PROCEDURE CODES    Primary Diagnosis Code/Primary Diagnosis Code description:  F10.239 Alcohol dependence with withdrawal, unspecified   E83.42 Hypomagnesemia   E87.6 Hypokalemia   I10 Essential (primary) hypertension     Additional Diagnosis Code(s) and Description(s)-(up to 3 additional codes):      Procedure Code (1) and description:        CLINICAL INFORMATION - PRIOR ADMISSION ONLY    Is there a prior admission with a discharge date within 30 days of the date of this admission?    X No (Proceed to the next section - \"Clinical Information - General Review Checklist\")      Yes (Provide the following information)     Prior admission dates:    MA Prior Authorization Number:        Review Outcome:     Diagnosis Code(s)/Description:    Procedure Code/Description:    Findings:    Treatment:    Condition on Discharge:   Vitals:    Labs:   Imaging:   Medications:    Follow-up Instructions:    Disposition:        CLINICAL INFORMATION - GENERAL REVIEW CHECKLIST    EMERGENCY DEPARTMENT: (Proceed to \"ADMISSION\" if Direct Admission)    Presenting Signs/Symptoms:      Medication/treatment prior to arrival in the ED:    Past Medical History:      Clinical Exam:    Initial Vital Signs: (Temp, Pulse, Resp, and BP)       Pertinent Repeat Vital Signs: (include times they were obtained)    Pertinent Sustained Findings: (include times they were obtained)    Weight in Kilograms:   Pertinent Labs (results):    Radiology (results):    EKG (results):     Other tests (results):    Tests pending final results:    Treatment in the ED:      Other treatments:      Change in condition while in the ED:     Response to ED " "Treatment:          OBSERVATION: (Proceed to \"ADMISSION\" if Direct Admission)      Orders written during the observation period  Meds Name, dose, route, time, how may doses given:  PRN Meds Name, dose, route, time, how many doses given within the first 24 hrs.:  IVs Type, rate, and total amt. ordered/given:  Labs, imaging, other:  Consults and findings:    Test Results during the observation period  Pertinent Lab tests (dates/results):  Culture results (blood, urine, spinal, wound, respiratory, etc.):  Imaging tests (dates/results):  EKG (dates/results):  Other test (dates/results):  Tests pending (dates/results):    Surgical or Invasive Procedures during the observation period  Name of surgery/procedure:  Date & Time:  Patient Response:  Post-operative orders:  Operative Report/Findings:    Response to Treatment, Major Change in Condition, Major Charge in Treatment during the observation period          ADMISSION:    DIRECT Admissions Only:  54 y.o. male who presented to medical detox. Inpatient admission for evaluation and treatment of alcohol withdrawal syndrome. Presented w/ need for detox from alcohol. Serum ETOH: 314. Reports vodka 750 ml, beer  daily, last drink on 5/27 @ 1400. Has prior rehab treatment for withdrawal. Reports no hx of withdrawal seizures. On exam, pt has no deficits or withdrawal symptoms. SEWS 0. Plan: SEWS monitoring w/ phenobarbital management, IV thiamine/folic acid supplement, IVF, telemetry, continuous pulse ox, continue PTA meds, trend labs, replete electrolytes as needed.   Presenting Signs/Symptoms:     Medication/treatment prior to arrival:    Past Medical History:    Clinical Exam on admission:    Vital Signs on admission: (Temp, Pulse, Resp, and BP)  Vitals [05/27/24 2037]   Temperature Pulse Respirations Blood Pressure SpO2   98.4 °F (36.9 °C) 75 18 143/87 99 %     Date/Time Temp Pulse Resp BP MAP (mmHg) SpO2 O2 Device Patient Position - Orthostatic VS   05/28/24 0908 97.8 °F " (36.6 °C) 74 18 112/71 84 95 % None (Room air) Lying   05/28/24 0430 97.7 °F (36.5 °C) 72 18 130/71 90 97 % None (Room air) Lying   05/28/24 0036 99.2 °F (37.3 °C) 69 18 127/78 94 98 % None (Room air) Lying   05/27/24 2037 98.4 °F (36.9 °C) 75 18 143/87 105 99 % None (Room air) Lying      Weight in kilograms:   05/27/24 79.4 kg (175 lb)     ALL Admissions:    Admission Orders and Other Orders written within the first 24 hrs after admission:  REgular Diet.   SCDs.  Fall & Seizure Precautions.  SEWS monitoring.  Telemetry & Continuous Pulse Ox.    MEDICATIONS:  Meds Name, dose, route, time, how may doses given:  enoxaparin, 40 mg, Subcutaneous, Daily  folic acid 1 mg, thiamine (VITAMIN B1) 100 mg in sodium chloride 0.9 % 100 mL IV piggyback, , Intravenous, Daily  magnesium sulfate, 2 g, Intravenous, Once-5/28 x 1   multivitamin-minerals, 1 tablet, Oral, Daily  OXcarbazepine, 600 mg, Oral, Q12H DOROTA  K Chlor 40 meq po once - 5/28 x 1      PRN Meds Name, dose, route, time, how many doses given within the first 24 hrs.:  acetaminophen, 650 mg, Oral, Q6H PRN  ondansetron, 4 mg, Intravenous, Q6H PRN  traZODone, 50 mg, Oral, HS PRN           IVs Type, rate, and total amt. ordered/given:  sodium chloride, 125 mL/hr, Intravenous, Continuous-d/c'd 5/28 @ 10:49       Vitals:       Labs:  Results from last 7 days   Lab Units 05/28/24  0445 05/27/24  1531   WBC Thousand/uL 4.57 5.65   HEMOGLOBIN g/dL 14.8 16.4   HEMATOCRIT % 41.7 48.8   PLATELETS Thousands/uL 178 190   TOTAL NEUT ABS Thousands/µL  --  3.02                Results from last 7 days   Lab Units 05/28/24  0445 05/27/24  1531   SODIUM mmol/L 141 144   POTASSIUM mmol/L 3.4* 4.3   CHLORIDE mmol/L 105 107   CO2 mmol/L 25 26   ANION GAP mmol/L 11 11   BUN mg/dL 11 9   CREATININE mg/dL 0.78 0.78   EGFR ml/min/1.73sq m 100 100   CALCIUM mg/dL 8.9 9.4   MAGNESIUM mg/dL 1.7* 2.2   PHOSPHORUS mg/dL  --  3.6            Results from last 7 days   Lab Units 05/28/24  7644  05/27/24  1531   AST U/L 55* 38   ALT U/L 39 33   ALK PHOS U/L 44 55   TOTAL PROTEIN g/dL 6.6 7.9   ALBUMIN g/dL 3.9 4.9   TOTAL BILIRUBIN mg/dL 0.35 0.28                Results from last 7 days   Lab Units 05/28/24  0445 05/27/24  1531   GLUCOSE RANDOM mg/dL 113 99           Results from last 7 days   Lab Units 05/27/24  1531   PROTIME seconds 12.6   INR   0.88   PTT seconds 31                 Results from last 7 days   Lab Units 05/27/24  1531   ETHANOL LVL mg/dL 314*   ACETAMINOPHEN LVL ug/mL <2*   SALICYLATE LVL mg/dL <5           Imaging:  No orders to display        Consults:        Test Results after admission  Pertinent Lab tests (dates/results):  Severity of Ethanol Withdrawal Scale (SEWS):      SEWS    Row Name 05/28/24 0800 05/28/24 0400 05/28/24 0000 05/27/24 2037    Severity of Ethanol Withdrawal Scale (SEWS)   ANXIETY: Do you feel that something bad is about to happen to you right now? 0  -BH 0  -NR 0  -NR 0  -NR    NAUSEA and DRY HEAVES or VOMITING? 0  -BH 0  -NR 0  -NR 0  -NR    SWEATING: (includes moist palms, sweating now)? Score 0 or 2 0  -BH 0  -NR 0  -NR 0  -NR    TREMOR: with arms extended eyes closed? 0  -BH 0  -NR 0  -NR 0  -NR    AGITATION: Fidgety, restless, pacing? 0  -BH 0  -NR 0  -NR 0  -NR    DISORIENTATION: 0  -BH 0  -NR 0  -NR 0  -NR    HALLUCINATIONS: 0  -BH 0  -NR 0  -NR 0  -NR    VITAL SIGNS: ANY (Pulse >110, Diastolic BP >90, Temp >99.6) 0  -BH 0  -NR 0  -NR 0  -NR    SEWS Total Score 0  -BH 0  -NR 0  -NR 0  -NR    Patterson Agitation Sedation Scale (RASS)   Patterson Agitation Sedation Scale (RASS) 0  -BH 0  -NR 0  -NR 0  -NR      Culture results (blood, urine, spinal, wound, respiratory, etc.):  Imaging tests (dates/results):  EKG (dates/results):  5/27 ECG - Normal sinus rhythm  Rightward axis  Borderline ECG    Other test (dates/results):  Tests pending (dates/results):    Surgical or Invasive Procedures  Name of surgery/procedure:  Date & Time:  Patient  Response:  Post-operative orders:  Operative Report/Findings:    Response to Treatment, Major Change in Condition, Major Charge in Treatment anytime during admission    Date: 5/28       Day 2: Pt reports no withdrawal symptoms. On exam, no deficits, requesting IP rehab. SEWS 0. Plan: continue SEWS monitoring w/ phenobarbital management, IV thiamine/folic acid supplement, telemetry, continuous pulse ox, continue current meds, trend labs, replete electrolytes as needed.      Hospital Course:      Dayton Daley is a 54 y.o. male patient who originally presented to the hospital on 5/27/2024 due to alcohol withdrawal.  Patient was admitted for several days and his symptoms resolved after his first dose of benzodiazepines.  No phenobarbital was required.  Patient was eager for inpatient treatment and was discharged from detox to inpatient facility of his choice.  He was well-appearing and in good spirits upon discharge.           Disposition on Discharge  Home, Rehab, SNF, LTC, Shelter, etc.: Discharge/Transfer to not defined Healthcare Facility    Cease to Breathe (CTB)  If a patient expires during an admission, in addition to the above information, please include:    Summary/timeline of the patient's decline in condition:    Medications and treatment:    Patient response to treatment:    Date and time patient ceased to breathe:        Is there a Readmission that follows this admission? no  If yes, provide dates:        InterQual Review  InterQual Criteria Met: no    Please include the InterQual Review, InterQual year/version used, and the criteria selected:   Criteria Review   REVIEW SUMMARY     InterQual® Review Status: Completed  Criteria Status: Not Met  Day of review: Episode Day 1  Condition Specific: Yes        REVIEW DETAILS     Product: LOC:Acute Adult  Subset: Withdrawal Syndrome        Select Day, One:          [  ] Episode Day 1, One:              [  ] ACUTE, One:                  [  ] Alcohol or anxiolytic  or hypnotic or sedative withdrawal syndrome and, Both:                      [  ] Finding, >= One:                          [  ] Alcohol withdrawal syndrome and, >= One:                              [X] Clinical risk factor or finding, >= One:                                  [X] Positive blood alcohol concentration (ALVERTO)        Version: InterQual® 2024, Mar. 2024 Release  InterQual® criteria (IQ) is confidential and proprietary information and is being provided to you solely as it pertains to the information requested. IQ may contain advanced clinical knowledge which we recommend you discuss with your physician upon disclosure to you. Use permitted by and subject to license with [employer] and/or one of its subsidiaries. IQ reflects clinical interpretations and analyses and cannot alone either (a) resolve medical ambiguities of particular situations; or (b) provide the sole basis for definitive decisions. IQ is intended solely for use as screening guidelines with respect to medical appropriateness of healthcare services. All ultimate care decisions are strictly and solely the obligation and responsibility of your health care provider. © 2023 [employer] and/or one of its subsidiaries. All Rights Reserved. CPT® only © 6281-7705 [employer]. All Rights Reserved.                 PLEASE SUBMIT THE COMPLETED FORM TO THE DEPARTMENT OF HUMAN SERVICES - DIVISION OF CLINICAL  REVIEW VIA FAX -[phone] or VIA E-MAIL TO RA-FFS_DRGRequests@[place].gov    Signature: Jose Alejandro White Date:  06/03/24    Confidentiality Notice: The documents accompanying this telecopy may contain confidential information belonging to the sender.  The information is intended only for the use of the individual named above. If you are not the intended recipient, you are hereby notified  That any disclosure, copying, distribution or taking of any telecopy is strictly prohibited.

## 2024-08-03 ENCOUNTER — APPOINTMENT (EMERGENCY)
Dept: CT IMAGING | Facility: HOSPITAL | Age: 54
End: 2024-08-03

## 2024-08-03 ENCOUNTER — HOSPITAL ENCOUNTER (EMERGENCY)
Facility: HOSPITAL | Age: 54
Discharge: HOME | End: 2024-08-03
Attending: STUDENT IN AN ORGANIZED HEALTH CARE EDUCATION/TRAINING PROGRAM
Payer: COMMERCIAL

## 2024-08-03 VITALS
HEART RATE: 75 BPM | DIASTOLIC BLOOD PRESSURE: 56 MMHG | OXYGEN SATURATION: 99 % | RESPIRATION RATE: 16 BRPM | SYSTOLIC BLOOD PRESSURE: 115 MMHG | TEMPERATURE: 98.3 F

## 2024-08-03 DIAGNOSIS — S81.801A WOUND OF RIGHT LOWER EXTREMITY, INITIAL ENCOUNTER: ICD-10-CM
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
## 2024-08-03 LAB
ALBUMIN SERPL BCG-MCNC: 4.8 G/DL (ref 3.5–5)
ALP SERPL-CCNC: 52 U/L (ref 34–104)
ALT SERPL W P-5'-P-CCNC: 29 U/L (ref 7–52)
ANION GAP SERPL CALCULATED.3IONS-SCNC: 12 MMOL/L (ref 4–13)
AST SERPL W P-5'-P-CCNC: 40 U/L (ref 13–39)
ATRIAL RATE: 84 BPM
BASOPHILS # BLD AUTO: 0.05 THOUSANDS/ÂΜL (ref 0–0.1)
BASOPHILS NFR BLD AUTO: 1 % (ref 0–1)
BILIRUB SERPL-MCNC: 0.54 MG/DL (ref 0.2–1)
BUN SERPL-MCNC: 14 MG/DL (ref 5–25)
CALCIUM SERPL-MCNC: 9.1 MG/DL (ref 8.4–10.2)
CHLORIDE SERPL-SCNC: 102 MMOL/L (ref 96–108)
CO2 SERPL-SCNC: 21 MMOL/L (ref 21–32)
CREAT SERPL-MCNC: 0.82 MG/DL (ref 0.6–1.3)
EOSINOPHIL # BLD AUTO: 0.04 THOUSAND/ÂΜL (ref 0–0.61)
EOSINOPHIL NFR BLD AUTO: 0 % (ref 0–6)
ERYTHROCYTE [DISTWIDTH] IN BLOOD BY AUTOMATED COUNT: 12.9 % (ref 11.6–15.1)
GFR SERPL CREATININE-BSD FRML MDRD: 98 ML/MIN/1.73SQ M
GLUCOSE SERPL-MCNC: 127 MG/DL (ref 65–140)
HCT VFR BLD AUTO: 44.3 % (ref 36.5–49.3)
HGB BLD-MCNC: 14.9 G/DL (ref 12–17)
IMM GRANULOCYTES # BLD AUTO: 0.03 THOUSAND/UL (ref 0–0.2)
IMM GRANULOCYTES NFR BLD AUTO: 0 % (ref 0–2)
LYMPHOCYTES # BLD AUTO: 1.43 THOUSANDS/ÂΜL (ref 0.6–4.47)
LYMPHOCYTES NFR BLD AUTO: 15 % (ref 14–44)
MCH RBC QN AUTO: 31.6 PG (ref 26.8–34.3)
MCHC RBC AUTO-ENTMCNC: 33.6 G/DL (ref 31.4–37.4)
MCV RBC AUTO: 94 FL (ref 82–98)
MONOCYTES # BLD AUTO: 1.48 THOUSAND/ÂΜL (ref 0.17–1.22)
MONOCYTES NFR BLD AUTO: 15 % (ref 4–12)
NEUTROPHILS # BLD AUTO: 6.6 THOUSANDS/ÂΜL (ref 1.85–7.62)
NEUTS SEG NFR BLD AUTO: 69 % (ref 43–75)
NRBC BLD AUTO-RTO: 0 /100 WBCS
P AXIS: 70 DEGREES
PLATELET # BLD AUTO: 190 THOUSANDS/UL (ref 149–390)
PMV BLD AUTO: 10.6 FL (ref 8.9–12.7)
POTASSIUM SERPL-SCNC: 3.3 MMOL/L (ref 3.5–5.3)
PR INTERVAL: 146 MS
PROT SERPL-MCNC: 7.4 G/DL (ref 6.4–8.4)
QRS AXIS: 73 DEGREES
QRSD INTERVAL: 90 MS
QT INTERVAL: 378 MS
QTC INTERVAL: 446 MS
RBC # BLD AUTO: 4.71 MILLION/UL (ref 3.88–5.62)
SODIUM SERPL-SCNC: 135 MMOL/L (ref 135–147)
T WAVE AXIS: 56 DEGREES
VENTRICULAR RATE: 84 BPM
WBC # BLD AUTO: 9.63 THOUSAND/UL (ref 4.31–10.16)

## 2024-08-03 PROCEDURE — 12001 RPR S/N/AX/GEN/TRNK 2.5CM/<: CPT | Performed by: STUDENT IN AN ORGANIZED HEALTH CARE EDUCATION/TRAINING PROGRAM

## 2024-08-03 PROCEDURE — 70450 CT HEAD/BRAIN W/O DYE: CPT

## 2024-08-03 PROCEDURE — 96361 HYDRATE IV INFUSION ADD-ON: CPT

## 2024-08-03 PROCEDURE — 90471 IMMUNIZATION ADMIN: CPT

## 2024-08-03 PROCEDURE — 85025 COMPLETE CBC W/AUTO DIFF WBC: CPT | Performed by: STUDENT IN AN ORGANIZED HEALTH CARE EDUCATION/TRAINING PROGRAM

## 2024-08-03 PROCEDURE — 90715 TDAP VACCINE 7 YRS/> IM: CPT | Performed by: STUDENT IN AN ORGANIZED HEALTH CARE EDUCATION/TRAINING PROGRAM

## 2024-08-03 PROCEDURE — 93005 ELECTROCARDIOGRAM TRACING: CPT

## 2024-08-03 PROCEDURE — 99284 EMERGENCY DEPT VISIT MOD MDM: CPT

## 2024-08-03 PROCEDURE — 96360 HYDRATION IV INFUSION INIT: CPT

## 2024-08-03 PROCEDURE — 80053 COMPREHEN METABOLIC PANEL: CPT | Performed by: STUDENT IN AN ORGANIZED HEALTH CARE EDUCATION/TRAINING PROGRAM

## 2024-08-03 PROCEDURE — 99285 EMERGENCY DEPT VISIT HI MDM: CPT | Performed by: STUDENT IN AN ORGANIZED HEALTH CARE EDUCATION/TRAINING PROGRAM

## 2024-08-03 PROCEDURE — 36415 COLL VENOUS BLD VENIPUNCTURE: CPT | Performed by: STUDENT IN AN ORGANIZED HEALTH CARE EDUCATION/TRAINING PROGRAM

## 2024-08-03 RX ORDER — GINSENG 100 MG
1 CAPSULE ORAL ONCE
Status: COMPLETED | OUTPATIENT
Start: 2024-08-03 | End: 2024-08-03

## 2024-08-03 RX ORDER — LIDOCAINE HYDROCHLORIDE 10 MG/ML
5 INJECTION, SOLUTION EPIDURAL; INFILTRATION; INTRACAUDAL; PERINEURAL ONCE
Status: COMPLETED | OUTPATIENT
Start: 2024-08-03 | End: 2024-08-03

## 2024-08-03 RX ADMIN — LIDOCAINE HYDROCHLORIDE 5 ML: 10 INJECTION, SOLUTION EPIDURAL; INFILTRATION; INTRACAUDAL at 16:43

## 2024-08-03 RX ADMIN — AMOXICILLIN AND CLAVULANATE POTASSIUM 1 TABLET: 875; 125 TABLET, FILM COATED ORAL at 14:58

## 2024-08-03 RX ADMIN — BACITRACIN 1 SMALL APPLICATION: 500 OINTMENT TOPICAL at 14:58

## 2024-08-03 RX ADMIN — TETANUS TOXOID, REDUCED DIPHTHERIA TOXOID AND ACELLULAR PERTUSSIS VACCINE, ADSORBED 0.5 ML: 5; 2.5; 8; 8; 2.5 SUSPENSION INTRAMUSCULAR at 14:58

## 2024-08-03 RX ADMIN — SODIUM CHLORIDE 1000 ML: 0.9 INJECTION, SOLUTION INTRAVENOUS at 14:45

## 2024-08-03 NOTE — ED PROVIDER NOTES
"History  Chief Complaint   Patient presents with    Alcohol Intoxication    Multiple Falls     Pt admits to drinking 1 L of vodka starting last night until this afternoon. Multiple falls. 40 years of ETOH abuse. Abrasions on arms and legs, unsure of head strike or LOC. Pt tearful in triage.      HPI    Patient is a 54-year-old presenting emerged from after having a mechanical fall last evening.  Patient admits to drinking about a liter of vodka.  Patient drinks daily.  Patient has had multiple falls.  Patient was dropped off by a friend.  Patient notes abrasions on his legs and arms.  Patient does not remember what time he fell.  Does not have any loss of consciousness is not on any blood thinning medications.  Review of systems otherwise negative.  History includes hypertension, depression and fatty liver.    Prior to Admission Medications   Prescriptions Last Dose Informant Patient Reported? Taking?   OXcarbazepine (TRILEPTAL) 600 mg tablet   No No   Sig: Take 1 tablet (600 mg total) by mouth every 12 (twelve) hours   thiamine 100 MG tablet   No No   Sig: Take 1 tablet (100 mg total) by mouth daily      Facility-Administered Medications: None       Past Medical History:   Diagnosis Date    Depression     ETOH abuse     Fatty liver     Hypertension        Past Surgical History:   Procedure Laterality Date    ADENOIDECTOMY      TONSILLECTOMY         History reviewed. No pertinent family history.  I have reviewed and agree with the history as documented.    E-Cigarette/Vaping    E-Cigarette Use Never User      E-Cigarette/Vaping Substances     Social History     Tobacco Use    Smoking status: Never    Smokeless tobacco: Never   Vaping Use    Vaping status: Never Used   Substance Use Topics    Alcohol use: Yes     Alcohol/week: 32.0 standard drinks of alcohol     Types: 12 Cans of beer, 20 Shots of liquor per week    Drug use: Not Currently     Types: Cocaine, \"Crack\" cocaine, Methamphetamines       Review of Systems "   Constitutional:  Negative for chills and fever.   HENT:  Negative for ear pain and sore throat.    Eyes:  Negative for pain and visual disturbance.   Respiratory:  Negative for cough and shortness of breath.    Cardiovascular:  Negative for chest pain and palpitations.   Gastrointestinal:  Negative for abdominal pain and vomiting.   Genitourinary:  Negative for dysuria and hematuria.   Musculoskeletal:  Negative for arthralgias and back pain.   Skin:  Positive for wound. Negative for color change and rash.   Neurological:  Negative for seizures and syncope.   All other systems reviewed and are negative.      Physical Exam  Physical Exam  Vitals and nursing note reviewed.   Constitutional:       General: He is not in acute distress.     Appearance: Normal appearance. He is well-developed.   HENT:      Head: Normocephalic and atraumatic.      Right Ear: Tympanic membrane normal.      Left Ear: Tympanic membrane normal.      Nose: Nose normal.      Mouth/Throat:      Mouth: Mucous membranes are moist.      Pharynx: Oropharynx is clear.   Eyes:      Extraocular Movements: Extraocular movements intact.      Conjunctiva/sclera: Conjunctivae normal.      Pupils: Pupils are equal, round, and reactive to light.   Cardiovascular:      Rate and Rhythm: Normal rate and regular rhythm.      Heart sounds: No murmur heard.  Pulmonary:      Effort: Pulmonary effort is normal. No respiratory distress.      Breath sounds: Normal breath sounds.   Abdominal:      Palpations: Abdomen is soft.      Tenderness: There is no abdominal tenderness.   Musculoskeletal:         General: No swelling.      Cervical back: Neck supple.   Skin:     General: Skin is warm and dry.      Capillary Refill: Capillary refill takes less than 2 seconds.      Comments: Superficial abrasions noted throughout arms and legs.  A 1 cm laceration on anterior shin.  A small wound 0.5 cm to left dorsal hand.   Neurological:      General: No focal deficit present.       Mental Status: He is alert and oriented to person, place, and time.      Comments: Cranial nerves II through XII intact.  Strength, sensation range of motion intact in bilateral upper and lower extremities.  Negative finger-nose-finger and heel-to-shin.     Psychiatric:         Mood and Affect: Mood normal.         Vital Signs  ED Triage Vitals [08/03/24 1415]   Temperature Pulse Respirations Blood Pressure SpO2   98.3 °F (36.8 °C) 102 (!) 25 146/88 98 %      Temp Source Heart Rate Source Patient Position - Orthostatic VS BP Location FiO2 (%)   Temporal Monitor Sitting Left arm --      Pain Score       --           Vitals:    08/03/24 1415 08/03/24 1500   BP: 146/88 127/83   Pulse: 102 79   Patient Position - Orthostatic VS: Sitting Lying         Visual Acuity  Visual Acuity      Flowsheet Row Most Recent Value   L Pupil Size (mm) 3   R Pupil Size (mm) 3            ED Medications  Medications   sodium chloride 0.9 % bolus 1,000 mL (1,000 mL Intravenous New Bag 8/3/24 1445)   tetanus-diphtheria-acellular pertussis (BOOSTRIX) IM injection 0.5 mL (0.5 mL Intramuscular Given 8/3/24 1458)   bacitracin topical ointment 1 small application (1 small application Topical Given 8/3/24 1458)   amoxicillin-clavulanate (AUGMENTIN) 875-125 mg per tablet 1 tablet (1 tablet Oral Given 8/3/24 1458)       Diagnostic Studies  Results Reviewed       Procedure Component Value Units Date/Time    Comprehensive metabolic panel [[id number]]  (Abnormal) Collected: 08/03/24 1445    Lab Status: Final result Specimen: Blood from Arm, Right Updated: 08/03/24 1527     Sodium 135 mmol/L      Potassium 3.3 mmol/L      Chloride 102 mmol/L      CO2 21 mmol/L      ANION GAP 12 mmol/L      BUN 14 mg/dL      Creatinine 0.82 mg/dL      Glucose 127 mg/dL      Calcium 9.1 mg/dL      AST 40 U/L      ALT 29 U/L      Alkaline Phosphatase 52 U/L      Total Protein 7.4 g/dL      Albumin 4.8 g/dL      Total Bilirubin 0.54 mg/dL      eGFR 98 ml/min/1.73sq m      Narrative:      National Kidney Disease Foundation guidelines for Chronic Kidney Disease (CKD):     Stage 1 with normal or high GFR (GFR > 90 mL/min/1.73 square meters)    Stage 2 Mild CKD (GFR = 60-89 mL/min/1.73 square meters)    Stage 3A Moderate CKD (GFR = 45-59 mL/min/1.73 square meters)    Stage 3B Moderate CKD (GFR = 30-44 mL/min/1.73 square meters)    Stage 4 Severe CKD (GFR = 15-29 mL/min/1.73 square meters)    Stage 5 End Stage CKD (GFR <15 mL/min/1.73 square meters)  Note: GFR calculation is accurate only with a steady state creatinine    CBC and differential [[id number]]  (Abnormal) Collected: 08/03/24 1445    Lab Status: Final result Specimen: Blood from Arm, Right Updated: 08/03/24 1456     WBC 9.63 Thousand/uL      RBC 4.71 Million/uL      Hemoglobin 14.9 g/dL      Hematocrit 44.3 %      MCV 94 fL      MCH 31.6 pg      MCHC 33.6 g/dL      RDW 12.9 %      MPV 10.6 fL      Platelets 190 Thousands/uL      nRBC 0 /100 WBCs      Segmented % 69 %      Immature Grans % 0 %      Lymphocytes % 15 %      Monocytes % 15 %      Eosinophils Relative 0 %      Basophils Relative 1 %      Absolute Neutrophils 6.60 Thousands/µL      Absolute Immature Grans 0.03 Thousand/uL      Absolute Lymphocytes 1.43 Thousands/µL      Absolute Monocytes 1.48 Thousand/µL      Eosinophils Absolute 0.04 Thousand/µL      Basophils Absolute 0.05 Thousands/µL                    CT head without contrast   Final Result by Giovany Navarrete DO (08/03 1538)      No acute intracranial abnormality.                  Workstation performed: [id number]                    Procedures  Procedures         ED Course                                               Medical Decision Making  Wound irrigated with 500 cc of sterile water.  No signs of any obvious debris.  3 sutures were placed loosely approximated.  Patient prescribed an antibiotic instructed take as prescribed.    Patient is considering rehab.  Warm handoff in place.  Patient is going to  try to go to a particular place and will be discharged.  Patient is alert oriented answering questions appropriately.  Patient is clinically sober, without any signs of withdrawal.    EKG: rate 84 NSR without signs of acute ischemic change. Compared to prior 5/24    Amount and/or Complexity of Data Reviewed  Labs: ordered.  Radiology: ordered.    Risk  OTC drugs.  Prescription drug management.                 Disposition  Final diagnoses:   None     ED Disposition       None          Follow-up Information    None         Patient's Medications   Discharge Prescriptions    No medications on file       No discharge procedures on file.    PDMP Review       None            ED Provider  Electronically Signed by           [S81.801A] Wound of right lower extremity, initial encounter           ED Disposition       ED Disposition   Discharge    Condition   Stable    Date/Time   Sat Aug 3, 2024  4:24 PM    Comment   Dayton Daley discharge to home/self care.                   Follow-up Information    None         Discharge Medication List as of 8/3/2024  4:27 PM        START taking these medications    Details   amoxicillin-clavulanate (AUGMENTIN) 875-125 mg per tablet Take 1 tablet by mouth every 12 (twelve) hours for 7 days, Starting Sat 8/3/2024, Until Sat 8/10/2024, Normal           CONTINUE these medications which have NOT CHANGED    Details   OXcarbazepine (TRILEPTAL) 600 mg tablet Take 1 tablet (600 mg total) by mouth every 12 (twelve) hours, Starting Thu 5/30/2024, Normal      thiamine 100 MG tablet Take 1 tablet (100 mg total) by mouth daily, Starting Sat 4/27/2024, Normal             No discharge procedures on file.    PDMP Review       None            ED Provider  Electronically Signed by             Stephanie Harrell DO  08/13/24 0928

## 2024-08-03 NOTE — DISCHARGE INSTRUCTIONS
Continue to keep your wounds clean and dry.  Take the antibiotic as prescribed.  He can use over-the-counter medication as needed for discomfort.    Follow-up with your primary care physician for reevaluation.    Return for any new or worsening symptoms.

## 2024-08-05 LAB
ATRIAL RATE: 84 BPM
P AXIS: 70 DEGREES
PR INTERVAL: 146 MS
QRS AXIS: 73 DEGREES
QRSD INTERVAL: 90 MS
QT INTERVAL: 378 MS
QTC INTERVAL: 446 MS
T WAVE AXIS: 56 DEGREES
VENTRICULAR RATE: 84 BPM

## 2024-08-05 PROCEDURE — 93010 ELECTROCARDIOGRAM REPORT: CPT | Performed by: INTERNAL MEDICINE

## 2024-08-09 ENCOUNTER — HOSPITAL ENCOUNTER (EMERGENCY)
Facility: HOSPITAL | Age: 54
End: 2024-08-10
Attending: EMERGENCY MEDICINE

## 2024-08-09 DIAGNOSIS — F10.920 ALCOHOLIC INTOXICATION WITHOUT COMPLICATION (HCC): Primary | ICD-10-CM

## 2024-08-09 DIAGNOSIS — F32.A DEPRESSION: ICD-10-CM

## 2024-08-09 DIAGNOSIS — R45.851 SUICIDAL IDEATION: ICD-10-CM

## 2024-08-09 DIAGNOSIS — L03.116 CELLULITIS OF LEFT LOWER LEG: ICD-10-CM

## 2024-08-09 LAB
ALBUMIN SERPL BCG-MCNC: 4.3 G/DL (ref 3.5–5)
ALP SERPL-CCNC: 55 U/L (ref 34–104)
ALT SERPL W P-5'-P-CCNC: 19 U/L (ref 7–52)
ANION GAP SERPL CALCULATED.3IONS-SCNC: 9 MMOL/L (ref 4–13)
AST SERPL W P-5'-P-CCNC: 28 U/L (ref 13–39)
BASOPHILS # BLD AUTO: 0.03 THOUSANDS/ÂΜL (ref 0–0.1)
BASOPHILS NFR BLD AUTO: 0 % (ref 0–1)
BILIRUB SERPL-MCNC: 0.39 MG/DL (ref 0.2–1)
BUN SERPL-MCNC: 9 MG/DL (ref 5–25)
CALCIUM SERPL-MCNC: 9.2 MG/DL (ref 8.4–10.2)
CHLORIDE SERPL-SCNC: 105 MMOL/L (ref 96–108)
CO2 SERPL-SCNC: 26 MMOL/L (ref 21–32)
CREAT SERPL-MCNC: 0.88 MG/DL (ref 0.6–1.3)
EOSINOPHIL # BLD AUTO: 0.18 THOUSAND/ÂΜL (ref 0–0.61)
EOSINOPHIL NFR BLD AUTO: 2 % (ref 0–6)
ERYTHROCYTE [DISTWIDTH] IN BLOOD BY AUTOMATED COUNT: 12.5 % (ref 11.6–15.1)
ETHANOL SERPL-MCNC: 233 MG/DL
GFR SERPL CREATININE-BSD FRML MDRD: 96 ML/MIN/1.73SQ M
GLUCOSE SERPL-MCNC: 106 MG/DL (ref 65–140)
HCT VFR BLD AUTO: 42.1 % (ref 36.5–49.3)
HGB BLD-MCNC: 14.2 G/DL (ref 12–17)
IMM GRANULOCYTES # BLD AUTO: 0.02 THOUSAND/UL (ref 0–0.2)
IMM GRANULOCYTES NFR BLD AUTO: 0 % (ref 0–2)
LYMPHOCYTES # BLD AUTO: 1.79 THOUSANDS/ÂΜL (ref 0.6–4.47)
LYMPHOCYTES NFR BLD AUTO: 22 % (ref 14–44)
MCH RBC QN AUTO: 32.5 PG (ref 26.8–34.3)
MCHC RBC AUTO-ENTMCNC: 33.7 G/DL (ref 31.4–37.4)
MCV RBC AUTO: 96 FL (ref 82–98)
MONOCYTES # BLD AUTO: 0.91 THOUSAND/ÂΜL (ref 0.17–1.22)
MONOCYTES NFR BLD AUTO: 11 % (ref 4–12)
NEUTROPHILS # BLD AUTO: 5.21 THOUSANDS/ÂΜL (ref 1.85–7.62)
NEUTS SEG NFR BLD AUTO: 65 % (ref 43–75)
NRBC BLD AUTO-RTO: 0 /100 WBCS
PLATELET # BLD AUTO: 192 THOUSANDS/UL (ref 149–390)
PMV BLD AUTO: 10.1 FL (ref 8.9–12.7)
POTASSIUM SERPL-SCNC: 4.4 MMOL/L (ref 3.5–5.3)
PROT SERPL-MCNC: 7.3 G/DL (ref 6.4–8.4)
RBC # BLD AUTO: 4.37 MILLION/UL (ref 3.88–5.62)
SODIUM SERPL-SCNC: 140 MMOL/L (ref 135–147)
WBC # BLD AUTO: 8.14 THOUSAND/UL (ref 4.31–10.16)

## 2024-08-09 PROCEDURE — 36415 COLL VENOUS BLD VENIPUNCTURE: CPT | Performed by: EMERGENCY MEDICINE

## 2024-08-09 PROCEDURE — 85025 COMPLETE CBC W/AUTO DIFF WBC: CPT | Performed by: EMERGENCY MEDICINE

## 2024-08-09 PROCEDURE — 99285 EMERGENCY DEPT VISIT HI MDM: CPT | Performed by: EMERGENCY MEDICINE

## 2024-08-09 PROCEDURE — 83735 ASSAY OF MAGNESIUM: CPT

## 2024-08-09 PROCEDURE — 99284 EMERGENCY DEPT VISIT MOD MDM: CPT

## 2024-08-09 PROCEDURE — 96365 THER/PROPH/DIAG IV INF INIT: CPT

## 2024-08-09 PROCEDURE — 96361 HYDRATE IV INFUSION ADD-ON: CPT

## 2024-08-09 PROCEDURE — 82077 ASSAY SPEC XCP UR&BREATH IA: CPT | Performed by: EMERGENCY MEDICINE

## 2024-08-09 PROCEDURE — 80053 COMPREHEN METABOLIC PANEL: CPT | Performed by: EMERGENCY MEDICINE

## 2024-08-09 RX ADMIN — SODIUM CHLORIDE 1000 ML: 0.9 INJECTION, SOLUTION INTRAVENOUS at 22:36

## 2024-08-09 RX ADMIN — Medication 1000 MG: at 22:36

## 2024-08-09 NOTE — LETTER
[facility] EMERGENCY DEPARTMENT  [address]  [place] [address]  Dept: [phone]      EMTALA TRANSFER CONSENT    NAME Dayton Daley                                         1970                              MRN [id number]    I have been informed of my rights regarding examination, treatment, and transfer   by Dr. Batsheva Mora,*    Benefits: Specialized equipment and/or services available at the receiving facility (Include comment)________________________    Risks: Potential for delay in receiving treatment      Consent for Transfer:  I acknowledge that my medical condition has been evaluated and explained to me by the emergency department physician or other qualified medical person and/or my attending physician, who has recommended that I be transferred to the service of  Accepting Physician: Jeferson Manley at Accepting Facility Name, City & State : [facility]. The above potential benefits of such transfer, the potential risks associated with such transfer, and the probable risks of not being transferred have been explained to me, and I fully understand them.  The doctor has explained that, in my case, the benefits of transfer outweigh the risks.  I agree to be transferred.    I authorize the performance of emergency medical procedures and treatments upon me in both transit and upon arrival at the receiving facility.  Additionally, I authorize the release of any and all medical records to the receiving facility and request they be transported with me, if possible.  I understand that the safest mode of transportation during a medical emergency is an ambulance and that the Hospital advocates the use of this mode of transport. Risks of traveling to the receiving facility by car, including absence of medical control, life sustaining equipment, such as oxygen, and medical personnel has been explained to me and I fully understand them.    (SEBLE  CORRECT BOX BELOW)  [X]  I consent to the stated transfer and to be transported by ambulance/helicopter.  [  ]  I consent to the stated transfer, but refuse transportation by ambulance and accept full responsibility for my transportation by car.  I understand the risks of non-ambulance transfers and I exonerate the Hospital and its staff from any deterioration in my condition that results from this refusal.    X___________________________________________    DATE  08/10/24  TIME________  Signature of patient or legally responsible individual signing on patient behalf           RELATIONSHIP TO PATIENT__________SELF_______________                  Provider Certification    NAME Dayton Daley                                         1970                              MRN [id number]    A medical screening exam was performed on the above named patient.  Based on the examination:    Condition Necessitating Transfer The primary encounter diagnosis was Alcoholic intoxication without complication (HCC). Diagnoses of Suicidal ideation, Depression, and Cellulitis of left lower leg were also pertinent to this visit.    Patient Condition: The patient has been stabilized such that within reasonable medical probability, no material deterioration of the patient condition or the condition of the unborn child(maria r) is likely to result from the transfer    Reason for Transfer: Level of Care needed not available at this facility    Transfer Requirements: Facility [facility]   Space available and qualified personnel available for treatment as acknowledged by Ayesha Lino   Agreed to accept transfer and to provide appropriate medical treatment as acknowledged by       Jeferson Manley  Appropriate medical records of the examination and treatment of the patient are provided at the time of transfer   STAFF INITIAL WHEN COMPLETED _CD______  Transfer will be performed by qualified personnel from  ________________  and appropriate transfer equipment as required, including the use of necessary and appropriate life support measures.    Provider Certification: I have examined the patient and explained the following risks and benefits of being transferred/refusing transfer to the patient/family:  The patient is stable for psychiatric transfer because they are medically stable, and is protected from harming him/herself or others during transport      Based on these reasonable risks and benefits to the patient and/or the unborn child(maria r), and based upon the information available at the time of the patient’s examination, I certify that the medical benefits reasonably to be expected from the provision of appropriate medical treatments at another medical facility outweigh the increasing risks, if any, to the individual’s medical condition, and in the case of labor to the unborn child, from effecting the transfer.    X____________________________________________ DATE 08/10/24        TIME_______      ORIGINAL - SEND TO MEDICAL RECORDS   COPY - SEND WITH PATIENT DURING TRANSFER

## 2024-08-10 ENCOUNTER — HOSPITAL ENCOUNTER (INPATIENT)
Facility: HOSPITAL | Age: 54
LOS: 9 days | Discharge: HOME | DRG: 751 | End: 2024-08-19
Attending: PSYCHIATRY & NEUROLOGY | Admitting: PSYCHIATRY & NEUROLOGY
Payer: COMMERCIAL

## 2024-08-10 VITALS
HEART RATE: 75 BPM | DIASTOLIC BLOOD PRESSURE: 60 MMHG | RESPIRATION RATE: 18 BRPM | TEMPERATURE: 98 F | BODY MASS INDEX: 26.25 KG/M2 | OXYGEN SATURATION: 97 % | WEIGHT: 172.62 LBS | SYSTOLIC BLOOD PRESSURE: 119 MMHG

## 2024-08-10 DIAGNOSIS — F10.920 ALCOHOLIC INTOXICATION WITHOUT COMPLICATION (HCC): ICD-10-CM

## 2024-08-10 DIAGNOSIS — S81.801A LEG WOUND, RIGHT: ICD-10-CM

## 2024-08-10 DIAGNOSIS — F32.A DEPRESSION: ICD-10-CM

## 2024-08-10 DIAGNOSIS — F33.2 SEVERE EPISODE OF RECURRENT MAJOR DEPRESSIVE DISORDER, WITHOUT PSYCHOTIC FEATURES (HCC): Primary | ICD-10-CM

## 2024-08-10 DIAGNOSIS — E53.8 VITAMIN B12 DEFICIENCY: ICD-10-CM

## 2024-08-10 DIAGNOSIS — F10.20 ALCOHOL USE DISORDER, SEVERE, DEPENDENCE (HCC): ICD-10-CM

## 2024-08-10 DIAGNOSIS — F41.1 ANXIETY, GENERALIZED: ICD-10-CM

## 2024-08-10 LAB
AMPHETAMINES SERPL QL SCN: NEGATIVE
BARBITURATES UR QL: NEGATIVE
BENZODIAZ UR QL: NEGATIVE
COCAINE UR QL: NEGATIVE
ETHANOL EXG-MCNC: 0.01 MG/DL
FENTANYL UR QL SCN: NEGATIVE
HYDROCODONE UR QL SCN: NEGATIVE
MAGNESIUM SERPL-MCNC: 2.3 MG/DL (ref 1.9–2.7)
METHADONE UR QL: NEGATIVE
OPIATES UR QL SCN: NEGATIVE
OXYCODONE+OXYMORPHONE UR QL SCN: NEGATIVE
PCP UR QL: NEGATIVE
THC UR QL: POSITIVE

## 2024-08-10 PROCEDURE — 82075 ASSAY OF BREATH ETHANOL: CPT | Performed by: EMERGENCY MEDICINE

## 2024-08-10 PROCEDURE — 80307 DRUG TEST PRSMV CHEM ANLYZR: CPT | Performed by: EMERGENCY MEDICINE

## 2024-08-10 RX ORDER — OLANZAPINE 2.5 MG/1
2.5 TABLET, FILM COATED ORAL
Status: DISCONTINUED | OUTPATIENT
Start: 2024-08-10 | End: 2024-08-19 | Stop reason: HOSPADM

## 2024-08-10 RX ORDER — OLANZAPINE 5 MG/1
5 TABLET ORAL
Status: DISCONTINUED | OUTPATIENT
Start: 2024-08-10 | End: 2024-08-19 | Stop reason: HOSPADM

## 2024-08-10 RX ORDER — LORAZEPAM 2 MG/ML
1 INJECTION INTRAMUSCULAR
Status: CANCELLED | OUTPATIENT
Start: 2024-08-10

## 2024-08-10 RX ORDER — AMOXICILLIN 250 MG
1 CAPSULE ORAL DAILY PRN
Status: DISCONTINUED | OUTPATIENT
Start: 2024-08-10 | End: 2024-08-19 | Stop reason: HOSPADM

## 2024-08-10 RX ORDER — HYDROXYZINE HYDROCHLORIDE 50 MG/1
50 TABLET, FILM COATED ORAL
Status: DISCONTINUED | OUTPATIENT
Start: 2024-08-10 | End: 2024-08-19 | Stop reason: HOSPADM

## 2024-08-10 RX ORDER — ACETAMINOPHEN 325 MG/1
650 TABLET ORAL EVERY 4 HOURS PRN
Status: DISCONTINUED | OUTPATIENT
Start: 2024-08-10 | End: 2024-08-19 | Stop reason: HOSPADM

## 2024-08-10 RX ORDER — AMOXICILLIN 250 MG
1 CAPSULE ORAL DAILY PRN
Status: CANCELLED | OUTPATIENT
Start: 2024-08-10

## 2024-08-10 RX ORDER — OLANZAPINE 2.5 MG/1
5 TABLET, FILM COATED ORAL
Status: CANCELLED | OUTPATIENT
Start: 2024-08-10

## 2024-08-10 RX ORDER — TRAZODONE HYDROCHLORIDE 50 MG/1
50 TABLET, FILM COATED ORAL
Status: DISCONTINUED | OUTPATIENT
Start: 2024-08-10 | End: 2024-08-19 | Stop reason: HOSPADM

## 2024-08-10 RX ORDER — ACETAMINOPHEN 325 MG/1
975 TABLET ORAL EVERY 6 HOURS PRN
Status: DISCONTINUED | OUTPATIENT
Start: 2024-08-10 | End: 2024-08-19 | Stop reason: HOSPADM

## 2024-08-10 RX ORDER — OLANZAPINE 10 MG/2ML
5 INJECTION, POWDER, FOR SOLUTION INTRAMUSCULAR
Status: CANCELLED | OUTPATIENT
Start: 2024-08-10

## 2024-08-10 RX ORDER — BENZTROPINE MESYLATE 1 MG/1
0.5 TABLET ORAL
Status: CANCELLED | OUTPATIENT
Start: 2024-08-10

## 2024-08-10 RX ORDER — LORAZEPAM 2 MG/ML
1 INJECTION INTRAMUSCULAR
Status: DISCONTINUED | OUTPATIENT
Start: 2024-08-10 | End: 2024-08-19 | Stop reason: HOSPADM

## 2024-08-10 RX ORDER — MULTIVITAMIN
1 TABLET ORAL DAILY
COMMUNITY

## 2024-08-10 RX ORDER — ACETAMINOPHEN 325 MG/1
975 TABLET ORAL EVERY 6 HOURS PRN
Status: CANCELLED | OUTPATIENT
Start: 2024-08-10

## 2024-08-10 RX ORDER — BENZTROPINE MESYLATE 0.5 MG/1
0.5 TABLET ORAL
Status: DISCONTINUED | OUTPATIENT
Start: 2024-08-10 | End: 2024-08-19 | Stop reason: HOSPADM

## 2024-08-10 RX ORDER — HYDROXYZINE HYDROCHLORIDE 25 MG/1
25 TABLET, FILM COATED ORAL
Status: CANCELLED | OUTPATIENT
Start: 2024-08-10

## 2024-08-10 RX ORDER — LORAZEPAM 1 MG/1
1 TABLET ORAL
Status: CANCELLED | OUTPATIENT
Start: 2024-08-10

## 2024-08-10 RX ORDER — FOLIC ACID 1 MG/1
1 TABLET ORAL DAILY
Status: ON HOLD | COMMUNITY
End: 2024-08-15

## 2024-08-10 RX ORDER — TRAZODONE HYDROCHLORIDE 50 MG/1
50 TABLET, FILM COATED ORAL
Status: CANCELLED | OUTPATIENT
Start: 2024-08-10

## 2024-08-10 RX ORDER — LORAZEPAM 1 MG/1
1 TABLET ORAL
Status: DISCONTINUED | OUTPATIENT
Start: 2024-08-10 | End: 2024-08-19 | Stop reason: HOSPADM

## 2024-08-10 RX ORDER — GABAPENTIN 100 MG/1
100 CAPSULE ORAL 3 TIMES DAILY
Status: DISCONTINUED | OUTPATIENT
Start: 2024-08-10 | End: 2024-08-16

## 2024-08-10 RX ORDER — OLANZAPINE 2.5 MG/1
2.5 TABLET, FILM COATED ORAL
Status: CANCELLED | OUTPATIENT
Start: 2024-08-10

## 2024-08-10 RX ORDER — CEPHALEXIN 500 MG/1
500 CAPSULE ORAL EVERY 6 HOURS SCHEDULED
Qty: 28 CAPSULE | Refills: 0 | Status: SHIPPED | OUTPATIENT
Start: 2024-08-10 | End: 2024-08-19

## 2024-08-10 RX ORDER — OLANZAPINE 10 MG/2ML
5 INJECTION, POWDER, FOR SOLUTION INTRAMUSCULAR
Status: DISCONTINUED | OUTPATIENT
Start: 2024-08-10 | End: 2024-08-19 | Stop reason: HOSPADM

## 2024-08-10 RX ORDER — ACETAMINOPHEN 325 MG/1
650 TABLET ORAL EVERY 4 HOURS PRN
Status: CANCELLED | OUTPATIENT
Start: 2024-08-10

## 2024-08-10 RX ORDER — HYDROXYZINE HYDROCHLORIDE 25 MG/1
25 TABLET, FILM COATED ORAL
Status: DISCONTINUED | OUTPATIENT
Start: 2024-08-10 | End: 2024-08-19 | Stop reason: HOSPADM

## 2024-08-10 RX ORDER — HYDROXYZINE HYDROCHLORIDE 25 MG/1
50 TABLET, FILM COATED ORAL
Status: CANCELLED | OUTPATIENT
Start: 2024-08-10

## 2024-08-10 RX ADMIN — GABAPENTIN 100 MG: 100 CAPSULE ORAL at 21:16

## 2024-08-10 RX ADMIN — GABAPENTIN 100 MG: 100 CAPSULE ORAL at 17:31

## 2024-08-10 RX ADMIN — CEPHALEXIN 500 MG: 250 CAPSULE ORAL at 21:16

## 2024-08-10 NOTE — ED NOTES
Room to be broken down for behavioral health when patient is medically cleared. Patient currently with IV in place receiving IVF     Stephanie Funetes RN  08/09/24 2182

## 2024-08-10 NOTE — PLAN OF CARE
Problem: SELF HARM/SUICIDALITY  Goal: Will have no self-injury during hospital stay  Description: INTERVENTIONS:  - Q 15 MINUTES: Routine safety checks  - Q WAKING SHIFT & PRN: Assess risk to determine if routine checks are adequate to maintain patient safety  - Encourage patient to participate actively in care by formulating a plan to combat response to suicidal ideation, identify supports and resources  Outcome: Progressing     Problem: DEPRESSION  Goal: Will be euthymic at discharge  Description: INTERVENTIONS:  - Administer medication as ordered  - Provide emotional support via 1:1 interaction with staff  - Encourage involvement in milieu/groups/activities  - Monitor for social isolation  Outcome: Progressing     Problem: ANXIETY  Goal: Will report anxiety at manageable levels  Description: INTERVENTIONS:  - Administer medication as ordered  - Teach and encourage coping skills  - Provide emotional support  - Assess patient/family for anxiety and ability to cope  Outcome: Progressing  Goal: By discharge: Patient will verbalize 2 strategies to deal with anxiety  Description: Interventions:  - Identify any obvious source/trigger to anxiety  - Staff will assist patient in applying identified coping technique/skills  - Encourage attendance of scheduled groups and activities  Outcome: Progressing     Problem: SUBSTANCE USE/ABUSE  Goal: Will have no detox symptoms and will verbalize plan for changing substance-related behavior  Description: INTERVENTIONS:  - Monitor physical status and assess for symptoms of withdrawal  - Administer medication as ordered  - Provide emotional support with 1 on 1 interaction with staff  - Encourage recovery focused program/ addiction education  - Assess for verbalization of changing behaviors related to substance abuse  - Initiate consults and referrals as appropriate (Case Management, Spiritual Care, etc.)  Outcome: Progressing  Goal: By discharge, will develop insight into their  chemical dependency and sustain motivation to continue in recovery  Description: INTERVENTIONS:  - Attends all daily group sessions and scheduled AA groups  - Actively practices coping skills through participation in the therapeutic community and adherence to program rules  - Reviews and completes assignments from individual treatment plan  - Assist patient development of understanding of their personal cycle of addiction and relapse triggers  Outcome: Progressing  Goal: By discharge, patient will have ongoing treatment plan addressing chemical dependency  Description: INTERVENTIONS:  - Assist patient with resources and/or appointments for ongoing recovery based living  Outcome: Progressing     Patient admitted this shift. Care plan initiated.

## 2024-08-10 NOTE — ED NOTES
Pt resting in bed, easily awakened. Pt given ginger ale at this time. No distress noted. 1;1 at bedside and VS completed.      Mariam Caceres RN  08/10/24 6276

## 2024-08-10 NOTE — ED NOTES
Pt is intoxicated and complaining of pain in the left lower leg from getting stitches a couple days ago. The area looks red and swollen. Pt doesn't have any shoes at this time and is very dirty and disheveled. CW was able to meet with pt. PT stated that he flew up from [place] for a reunion for [employer]. PT stated that he comes back and fourth living with his father and step mother. Pt reports that PT stated taht he has mulpitle Rehab admissions the last one bring [facility] and  in May. PT stated that he has been to [employer] for 45 days. Pt stated that he does go to  regularly. PT stated that he is on probation for a DUI. Dayton reports that he might have pending as he broke a window at his step mothers home and trespassing. He reported that when his step mother found out about his drinking she kicked him out. Dayton explained that he has been stuggling with substance abuse for about 40 years. PT stated that he drinks daily. PT stated when he was drinking he was haivng SI with a plan to make it look like an accident. PT reports that his is the first time he has done that. PT stated that he has no prior attempts in the past. Pt stated that he has been having bad thoughts. PT reports that he has been making poor decisions due to his drinking. PT reports that he has had a problem with crack/cocaine. PT denied using at this point. PT reports that in general he sleep and appetite is okay. PT denied HI/AH/VH at this time. PT stated that he has no current SI thoughts but reports that he would like mental health treatment at this time. PT and  signed 201.201 explained to pt.     201 sent to intake

## 2024-08-10 NOTE — PROGRESS NOTES
08/10/24 1454 08/10/24 1455   Provider Notification   Reason for Communication Admission  (PTA) Admission  (PTA)   Provider Name MIGUEL Castillo Dr.   Provider Role Attending physician Attending physician   Method of Communication Other (Comment)  (EpicChat) Other (Comment)  (EpicChat)   Response No new orders No new orders   Notification Time [id number]

## 2024-08-10 NOTE — ED NOTES
Room broken down for behavioral health patient at this time. Patient changed into paper scrubs by tech & belongings secured in locker. 1:1 sitter remains at bedside.      Stephanie Fuentes RN  08/10/24 0122

## 2024-08-10 NOTE — ED PROVIDER NOTES
"History  Chief Complaint   Patient presents with    Alcohol Intoxication     Pt is intoxicated and complaining of pain in the left lower leg from getting stitches a couple days ago. The area looks red and swollen. Pt doesn't have any shoes at this time and is very dirty and disheveled      Dayton Daley is a 54 y.o.  year old male  Past Medical History:  No date: Depression  No date: ETOH abuse  No date: Fatty liver  No date: Hypertension  Social History    Tobacco Use      Smoking status: Never      Smokeless tobacco: Never    Vaping Use      Vaping status: Never Used    Alcohol use: Yes      Alcohol/week: 32.0 standard drinks of alcohol      Types: 12 Cans of beer, 20 Shots of liquor per week    Drug use: Not Currently      Types: Cocaine, \"Crack\" cocaine, Methamphetamines    Patient presents with:  Alcohol Intoxication: Pt is intoxicated and complaining of pain in the left lower leg from getting stitches a couple days ago. The area looks red and swollen. Pt doesn't have any shoes at this time and is very dirty and disheveled   ____________________________________  Patient has been abusing ETOH due to depression with suicidal thoughts.   #2 RLE with redness and warmth on the area of recent laceration repair (never took the prescribed antibiotics)    History obtained directly from the PATIENT        '      History provided by:  Patient   used: No    Alcohol Intoxication  Associated symptoms: suicidal ideation    Associated symptoms: no abdominal pain, no palpitations, no seizures, no shortness of breath and no vomiting        Prior to Admission Medications   Prescriptions Last Dose Informant Patient Reported? Taking?   OXcarbazepine (TRILEPTAL) 600 mg tablet   No No   Sig: Take 1 tablet (600 mg total) by mouth every 12 (twelve) hours   amoxicillin-clavulanate (AUGMENTIN) 875-125 mg per tablet   No No   Sig: Take 1 tablet by mouth every 12 (twelve) hours for 7 days   thiamine 100 MG tablet   No " "No   Sig: Take 1 tablet (100 mg total) by mouth daily      Facility-Administered Medications: None       Past Medical History:   Diagnosis Date    Depression     ETOH abuse     Fatty liver     Hypertension        Past Surgical History:   Procedure Laterality Date    ADENOIDECTOMY      TONSILLECTOMY         History reviewed. No pertinent family history.  I have reviewed and agree with the history as documented.    E-Cigarette/Vaping    E-Cigarette Use Never User      E-Cigarette/Vaping Substances     Social History     Tobacco Use    Smoking status: Never    Smokeless tobacco: Never   Vaping Use    Vaping status: Never Used   Substance Use Topics    Alcohol use: Yes     Alcohol/week: 32.0 standard drinks of alcohol     Types: 12 Cans of beer, 20 Shots of liquor per week    Drug use: Not Currently     Types: Cocaine, \"Crack\" cocaine, Methamphetamines       Review of Systems   Constitutional:  Negative for chills and fever.   HENT:  Negative for ear pain and sore throat.    Eyes:  Negative for pain and visual disturbance.   Respiratory:  Negative for cough and shortness of breath.    Cardiovascular:  Negative for chest pain and palpitations.   Gastrointestinal:  Negative for abdominal pain and vomiting.   Genitourinary:  Negative for dysuria and hematuria.   Musculoskeletal:  Negative for arthralgias and back pain.   Skin:  Positive for wound. Negative for color change and rash.   Neurological:  Negative for seizures and syncope.   Psychiatric/Behavioral:  Positive for suicidal ideas. Negative for self-injury and sleep disturbance.    All other systems reviewed and are negative.      Physical Exam  Physical Exam  Vitals and nursing note reviewed.   Constitutional:       General: He is not in acute distress.     Appearance: Normal appearance. He is well-developed.   HENT:      Head: Normocephalic and atraumatic.      Right Ear: External ear normal.      Left Ear: External ear normal.      Nose: Nose normal.   Eyes:      " Conjunctiva/sclera: Conjunctivae normal.   Cardiovascular:      Rate and Rhythm: Normal rate and regular rhythm.      Pulses: Normal pulses.      Heart sounds: Normal heart sounds. No murmur heard.  Pulmonary:      Effort: Pulmonary effort is normal. No respiratory distress.      Breath sounds: Normal breath sounds.   Abdominal:      Palpations: Abdomen is soft.      Tenderness: There is no abdominal tenderness.   Musculoskeletal:         General: No swelling.      Cervical back: Neck supple.   Skin:     General: Skin is warm.      Capillary Refill: Capillary refill takes less than 2 seconds.      Comments: RLE with redness and warmth consistent with cellulitis (mild)  Mild drain from wound. No dehiscence.      Neurological:      General: No focal deficit present.      Mental Status: He is alert and oriented to person, place, and time.   Psychiatric:      Comments: Flat affect, depressed  Tearful  Clearly intoxicated ETOH         Vital Signs  ED Triage Vitals [08/09/24 2143]   Temperature Pulse Respirations Blood Pressure SpO2   98 °F (36.7 °C) (!) 108 16 124/75 98 %      Temp Source Heart Rate Source Patient Position - Orthostatic VS BP Location FiO2 (%)   Oral Monitor Sitting Left arm --      Pain Score       --           Vitals:    08/09/24 2143 08/10/24 0308   BP: 124/75 107/55   Pulse: (!) 108 82   Patient Position - Orthostatic VS: Sitting Lying         Visual Acuity  Visual Acuity      Flowsheet Row Most Recent Value   L Pupil Size (mm) 3   R Pupil Size (mm) 3            ED Medications  Medications   ceftriaxone (ROCEPHIN) 1 g/50 mL in dextrose IVPB (0 mg Intravenous Stopped 8/9/24 2313)   sodium chloride 0.9 % bolus 1,000 mL (0 mL Intravenous Stopped 8/9/24 2359)       Diagnostic Studies  Results Reviewed       Procedure Component Value Units Date/Time    Ethanol [[id number]]  (Abnormal) Collected: 08/09/24 2233    Lab Status: Final result Specimen: Blood from Arm, Right Updated: 08/09/24 2310     Ethanol Lvl  233 mg/dL     Comprehensive metabolic panel [[id number]] Collected: 08/09/24 2233    Lab Status: Final result Specimen: Blood from Arm, Right Updated: 08/09/24 2310     Sodium 140 mmol/L      Potassium 4.4 mmol/L      Chloride 105 mmol/L      CO2 26 mmol/L      ANION GAP 9 mmol/L      BUN 9 mg/dL      Creatinine 0.88 mg/dL      Glucose 106 mg/dL      Calcium 9.2 mg/dL      AST 28 U/L      ALT 19 U/L      Alkaline Phosphatase 55 U/L      Total Protein 7.3 g/dL      Albumin 4.3 g/dL      Total Bilirubin 0.39 mg/dL      eGFR 96 ml/min/1.73sq m     Narrative:      [employer] guidelines for Chronic Kidney Disease (CKD):     Stage 1 with normal or high GFR (GFR > 90 mL/min/1.73 square meters)    Stage 2 Mild CKD (GFR = 60-89 mL/min/1.73 square meters)    Stage 3A Moderate CKD (GFR = 45-59 mL/min/1.73 square meters)    Stage 3B Moderate CKD (GFR = 30-44 mL/min/1.73 square meters)    Stage 4 Severe CKD (GFR = 15-29 mL/min/1.73 square meters)    Stage 5 End Stage CKD (GFR <15 mL/min/1.73 square meters)  Note: GFR calculation is accurate only with a steady state creatinine    CBC and differential [[id number]] Collected: 08/09/24 2233    Lab Status: Final result Specimen: Blood from Arm, Right Updated: 08/09/24 2254     WBC 8.14 Thousand/uL      RBC 4.37 Million/uL      Hemoglobin 14.2 g/dL      Hematocrit 42.1 %      MCV 96 fL      MCH 32.5 pg      MCHC 33.7 g/dL      RDW 12.5 %      MPV 10.1 fL      Platelets 192 Thousands/uL      nRBC 0 /100 WBCs      Segmented % 65 %      Immature Grans % 0 %      Lymphocytes % 22 %      Monocytes % 11 %      Eosinophils Relative 2 %      Basophils Relative 0 %      Absolute Neutrophils 5.21 Thousands/µL      Absolute Immature Grans 0.02 Thousand/uL      Absolute Lymphocytes 1.79 Thousands/µL      Absolute Monocytes 0.91 Thousand/µL      Eosinophils Absolute 0.18 Thousand/µL      Basophils Absolute 0.03 Thousands/µL     Rapid drug screen, urine [[id number]]     Lab  Status: No result Specimen: Urine                    No orders to display              Procedures  Procedures         ED Course  ED Course as of 08/10/24 0412   Fri Aug 09, 2024   2303 WBC: 8.14   2303 Hemoglobin: 14.2   2322 ETHANOL(!): 233            CIWA-Ar Score       Row Name 08/10/24 0316             CIWA-Ar    Nausea and Vomiting 0      Tactile Disturbances 0      Tremor 0      Auditory Disturbances 0      Paroxysmal Sweats 0      Visual Disturbances 0      Anxiety 0      Headache, Fullness in Head 0      Agitation 0      Orientation and Clouding of Sensorium 0      CIWA-Ar Total 0                                        SBIRT 22yo+      Flowsheet Row Most Recent Value   Initial Alcohol Screen: US AUDIT-C     1. How often do you have a drink containing alcohol? 0 Filed at: 08/09/2024 2146   2. How many drinks containing alcohol do you have on a typical day you are drinking?  0 Filed at: 08/09/2024 2146   3a. Male UNDER 65: How often do you have five or more drinks on one occasion? 0 Filed at: 08/09/2024 2146   Audit-C Score 0 Filed at: 08/09/2024 2146   EVELIN: How many times in the past year have you...    Used an illegal drug or used a prescription medication for non-medical reasons? Never Filed at: 08/09/2024 2146                      Medical Decision Making  Problems Addressed:  Alcoholic intoxication without complication (HCC): acute illness or injury  Depression: chronic illness or injury with exacerbation, progression, or side effects of treatment  Suicidal ideation: acute illness or injury    Amount and/or Complexity of Data Reviewed  Labs: ordered. Decision-making details documented in ED Course.  Discussion of management or test interpretation with external provider(s): Pt to be seen by 'crisis'                 Disposition  Final diagnoses:   Alcoholic intoxication without complication (HCC)   Suicidal ideation   Depression     Time reflects when diagnosis was documented in both MDM as applicable and  the Disposition within this note       Time User Action Codes Description Comment    8/9/2024 11:49 PM Toni Art [F10.920] Alcoholic intoxication without complication (HCC)     8/9/2024 11:49 PM Toni Art [R45.851] Suicidal ideation     8/9/2024 11:49 PM Toni Art [F32.A] Depression           ED Disposition       ED Disposition   Transfer to Behavioral Health Condition   --    Date/Time   Sat Aug 10, 2024 0103    Comment   Dayton Daley should be transferred out to   and has been medically cleared.                Follow-up Information    None         Patient's Medications   Discharge Prescriptions    No medications on file       No discharge procedures on file.    PDMP Review       None            ED Provider  Electronically Signed by             Toni Art MD  08/10/24 1218

## 2024-08-10 NOTE — ED RE-EVALUATION NOTE
Received signout from Dr. Art at 7 AM during shift change.  Patient had presented overnight with alcohol intoxication, depression and suicidal ideation.  Given his intoxication, he was unable to be seen by crisis worker last night and at time of signout, repeat alcohol level and crisis worker evaluation was pending.    Repeat alcohol level this morning is under the legal limit.  Crisis worker, Ayesha Lino, evaluated patient when legally and clinically sober and patient still expressing depression and suicidal ideation.  Patient signed 201 for voluntary inpatient psychiatric treatment. 201 signed. Bed search in progress.   Patient accepted to [facility] unit.      Batsheva Mora DO  08/10/24 8031

## 2024-08-10 NOTE — ED NOTES
Patient is accepted at [facility]   Patient is accepted by Dr. Jeferson Bullock in intake      Transportation is arranged with [facility].     Transportation is scheduled for 120pm     Patient may go to the floor at 120pm           Nurse report is to be called to  prior to patient transfer.

## 2024-08-10 NOTE — NURSING NOTE
Patient admitted to [facility] this afternoon at 1438 from [facility] ED under 201. Patient admitted due to history of alcohol abuse, increased depression, and suicidal thoughts.  Upon arrival, patient denies current suicidal thoughts. He did state he often has passive suicidal thoughts and thoughts that it would be good if he had an accident and disappeared. He states he has been sad and everyone in his family has been upset with him. He denies any past suicide attempts. He endorses mild anxiety and depression upon arrival.  CIWA upon admission is 1. Patient showing no signs of alcohol withdrawal. He states he has been drinking for 40 years. States he drinks many drinks per day (typically more than 5-6 drinks/day) and drinks any type of alcohol is available. States his last drink was yesterday (8/9).  He has no complaints of pain. Wound observed on on right shin. Sutures present. The area surrounding the wound appears slightly red and is warm to the touch. He has scabbed scratches scattered on his arms and legs. His skin was checked for ticks [place] and no ticks observed.

## 2024-08-10 NOTE — ED NOTES
1:1 sitter at the bedside at this time. Awaiting provider. Patient calm & cooperative laying in bed. At this time patient denies SI/HI     Stephanie Fuentes RN  08/09/24 1027

## 2024-08-11 PROBLEM — F10.24 ALCOHOL-INDUCED DEPRESSIVE DISORDER WITH MODERATE OR SEVERE USE DISORDER (HCC): Status: ACTIVE | Noted: 2024-08-11

## 2024-08-11 LAB
ALBUMIN SERPL BCG-MCNC: 3.7 G/DL (ref 3.5–5)
ALP SERPL-CCNC: 47 U/L (ref 34–104)
ALT SERPL W P-5'-P-CCNC: 14 U/L (ref 7–52)
ANION GAP SERPL CALCULATED.3IONS-SCNC: 5 MMOL/L (ref 4–13)
APTT PPP: 33 SECONDS (ref 23–34)
APTT PPP: 34 SECONDS (ref 23–34)
AST SERPL W P-5'-P-CCNC: 14 U/L (ref 13–39)
BASOPHILS # BLD AUTO: 0.04 THOUSANDS/ÂΜL (ref 0–0.1)
BASOPHILS NFR BLD AUTO: 1 % (ref 0–1)
BILIRUB SERPL-MCNC: 0.35 MG/DL (ref 0.2–1)
BUN SERPL-MCNC: 9 MG/DL (ref 5–25)
CALCIUM SERPL-MCNC: 8.8 MG/DL (ref 8.4–10.2)
CHLORIDE SERPL-SCNC: 105 MMOL/L (ref 96–108)
CHOLEST SERPL-MCNC: 157 MG/DL
CO2 SERPL-SCNC: 28 MMOL/L (ref 21–32)
CREAT SERPL-MCNC: 0.84 MG/DL (ref 0.6–1.3)
EOSINOPHIL # BLD AUTO: 0.2 THOUSAND/ÂΜL (ref 0–0.61)
EOSINOPHIL NFR BLD AUTO: 4 % (ref 0–6)
ERYTHROCYTE [DISTWIDTH] IN BLOOD BY AUTOMATED COUNT: 12.8 % (ref 11.6–15.1)
FIBRINOGEN PPP-MCNC: 350 MG/DL (ref 206–523)
GFR SERPL CREATININE-BSD FRML MDRD: 97 ML/MIN/1.73SQ M
GLUCOSE P FAST SERPL-MCNC: 108 MG/DL (ref 65–99)
GLUCOSE SERPL-MCNC: 108 MG/DL (ref 65–140)
HCT VFR BLD AUTO: 39.4 % (ref 36.5–49.3)
HDLC SERPL-MCNC: 51 MG/DL
HGB BLD-MCNC: 13.1 G/DL (ref 12–17)
IMM GRANULOCYTES # BLD AUTO: 0.01 THOUSAND/UL (ref 0–0.2)
IMM GRANULOCYTES NFR BLD AUTO: 0 % (ref 0–2)
INR PPP: 0.91 (ref 0.85–1.19)
LDLC SERPL CALC-MCNC: 90 MG/DL (ref 0–100)
LYMPHOCYTES # BLD AUTO: 1.73 THOUSANDS/ÂΜL (ref 0.6–4.47)
LYMPHOCYTES NFR BLD AUTO: 33 % (ref 14–44)
MCH RBC QN AUTO: 32.9 PG (ref 26.8–34.3)
MCHC RBC AUTO-ENTMCNC: 33.2 G/DL (ref 31.4–37.4)
MCV RBC AUTO: 99 FL (ref 82–98)
MONOCYTES # BLD AUTO: 0.71 THOUSAND/ÂΜL (ref 0.17–1.22)
MONOCYTES NFR BLD AUTO: 13 % (ref 4–12)
NEUTROPHILS # BLD AUTO: 2.6 THOUSANDS/ÂΜL (ref 1.85–7.62)
NEUTS SEG NFR BLD AUTO: 49 % (ref 43–75)
NONHDLC SERPL-MCNC: 106 MG/DL
NRBC BLD AUTO-RTO: 0 /100 WBCS
PLATELET # BLD AUTO: 185 THOUSANDS/UL (ref 149–390)
PLATELET # BLD AUTO: 185 THOUSANDS/UL (ref 149–390)
PMV BLD AUTO: 10.9 FL (ref 8.9–12.7)
PMV BLD AUTO: 10.9 FL (ref 8.9–12.7)
POTASSIUM SERPL-SCNC: 3.8 MMOL/L (ref 3.5–5.3)
PROT SERPL-MCNC: 6.2 G/DL (ref 6.4–8.4)
PROTHROMBIN TIME: 12.3 SECONDS (ref 12.3–15)
PROTHROMBIN TIME: 12.8 SECONDS (ref 12.3–15)
RBC # BLD AUTO: 3.98 MILLION/UL (ref 3.88–5.62)
SODIUM SERPL-SCNC: 138 MMOL/L (ref 135–147)
THROMBIN TIME: 16.8 SECONDS (ref 14.7–18.4)
TRIGL SERPL-MCNC: 80 MG/DL
TSH SERPL DL<=0.05 MIU/L-ACNC: 2.38 UIU/ML (ref 0.45–4.5)
WBC # BLD AUTO: 5.29 THOUSAND/UL (ref 4.31–10.16)

## 2024-08-11 PROCEDURE — 85049 AUTOMATED PLATELET COUNT: CPT

## 2024-08-11 PROCEDURE — 82306 VITAMIN D 25 HYDROXY: CPT

## 2024-08-11 PROCEDURE — 80053 COMPREHEN METABOLIC PANEL: CPT

## 2024-08-11 PROCEDURE — 85025 COMPLETE CBC W/AUTO DIFF WBC: CPT

## 2024-08-11 PROCEDURE — 84443 ASSAY THYROID STIM HORMONE: CPT

## 2024-08-11 PROCEDURE — 85732 THROMBOPLASTIN TIME PARTIAL: CPT

## 2024-08-11 PROCEDURE — 85670 THROMBIN TIME PLASMA: CPT

## 2024-08-11 PROCEDURE — 85730 THROMBOPLASTIN TIME PARTIAL: CPT

## 2024-08-11 PROCEDURE — 85610 PROTHROMBIN TIME: CPT

## 2024-08-11 PROCEDURE — 82607 VITAMIN B-12: CPT

## 2024-08-11 PROCEDURE — 85384 FIBRINOGEN ACTIVITY: CPT

## 2024-08-11 PROCEDURE — 80061 LIPID PANEL: CPT

## 2024-08-11 PROCEDURE — 85611 PROTHROMBIN TEST: CPT

## 2024-08-11 RX ORDER — FOLIC ACID 1 MG/1
1 TABLET ORAL DAILY
Status: DISCONTINUED | OUTPATIENT
Start: 2024-08-11 | End: 2024-08-19 | Stop reason: HOSPADM

## 2024-08-11 RX ORDER — LANOLIN ALCOHOL/MO/W.PET/CERES
100 CREAM (GRAM) TOPICAL DAILY
Status: DISCONTINUED | OUTPATIENT
Start: 2024-08-11 | End: 2024-08-19 | Stop reason: HOSPADM

## 2024-08-11 RX ADMIN — CEPHALEXIN 500 MG: 250 CAPSULE ORAL at 06:13

## 2024-08-11 RX ADMIN — CEPHALEXIN 500 MG: 250 CAPSULE ORAL at 13:35

## 2024-08-11 RX ADMIN — CEPHALEXIN 500 MG: 250 CAPSULE ORAL at 21:26

## 2024-08-11 RX ADMIN — Medication 1 TABLET: at 13:35

## 2024-08-11 RX ADMIN — GABAPENTIN 100 MG: 100 CAPSULE ORAL at 16:50

## 2024-08-11 RX ADMIN — GABAPENTIN 100 MG: 100 CAPSULE ORAL at 08:47

## 2024-08-11 RX ADMIN — THIAMINE HCL TAB 100 MG 100 MG: 100 TAB at 13:35

## 2024-08-11 RX ADMIN — GABAPENTIN 100 MG: 100 CAPSULE ORAL at 21:26

## 2024-08-11 RX ADMIN — FOLIC ACID 1 MG: 1 TABLET ORAL at 13:35

## 2024-08-11 NOTE — TREATMENT TEAM
08/10/24 2117   CIWA-Ar   Nausea and Vomiting 0   Tactile Disturbances 0   Tremor 0   Auditory Disturbances 0   Paroxysmal Sweats 0   Visual Disturbances 0   Anxiety 1   Headache, Fullness in Head 0   Agitation 0   Orientation and Clouding of Sensorium 0   CIWA-Ar Total 1

## 2024-08-11 NOTE — NURSING NOTE
B - Abdomen is soft and non-distended. Bowel sounds positive x4. Patient reports a BM yesterday.  L - Lungs clear to auscultation. Denies SOB or chest pain.  E - No edema observed.  P - CRT <3 seconds. PPP.  S - Scattered healing scabs on arms and legs. Sutured wound on right shin. It is slightly warm to the touch and slight redness observed. Cleansed and covered with border gauze.
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
## 2024-08-11 NOTE — NURSING NOTE
Patient had broken sleep due to roommate and the attention needed by staff w/ their roomate. No acute behaviors or concerns voiced. Will CTM. Continuous patient safety checks in progress.

## 2024-08-11 NOTE — NURSING NOTE
Patient visible out and about in the community. He is pleasant and brightens in conversation. He reports mild anxiety today. Denies depression. Denies suicidal thoughts. No signs of alcohol withdrawal present. CIWA remains 1. He has no complaints of pain. Takes medications without difficulty. He is thankful for care. Able to make needs known.

## 2024-08-11 NOTE — NURSING NOTE
Pt up ad adilia & gait is steady. Pt is pleasant & socializes with other patients. Pt is cooperative & compliant with medications. Pt denies any depression & c/o mild anxiety. Pt denies any hallucinations, suicidal or homicidal ideations. Q 7 min checks maintained to monitor pt's behavior & safety. CIWA -1.

## 2024-08-11 NOTE — H&P
Crystal Daley#  :1970 M  MRN:[id number]    CSN:[id number]  Adm Date: 8/10/2024 1438  2:38 PM   ATT PHY: Josef Cuello Md  [facility]         Chief Complaint: worsening depression      History of Presenting Illness: Dayton Daley is a(n) 54 y.o. year old male who is admitted to [facility] on 201 voluntary commitment basis.  Patient originally presented to [facility] ED on  with worsening depression.      Patient examined at bedside.  Patient has significant history of alcohol abuse.  Had recent visit to ED for laceration repair after mechanical fall while intoxicated.  Patient reports redness and swelling at suture site on R leg.  Patient also reports blister on inner L foot that is bothering him.     Labs normal .  Vitamins pending.     No Known Allergies    No current facility-administered medications on file prior to encounter.     Current Outpatient Medications on File Prior to Encounter   Medication Sig Dispense Refill    [] amoxicillin-clavulanate (AUGMENTIN) 875-125 mg per tablet Take 1 tablet by mouth every 12 (twelve) hours for 7 days 14 tablet 0    folic acid (FOLVITE) 1 mg tablet Take 1 mg by mouth daily      Multiple Vitamin (multivitamin) tablet Take 1 tablet by mouth daily      OXcarbazepine (TRILEPTAL) 600 mg tablet Take 1 tablet (600 mg total) by mouth every 12 (twelve) hours (Patient taking differently: Take 600 mg by mouth every 12 (twelve) hours) 60 tablet 0    thiamine 100 MG tablet Take 1 tablet (100 mg total) by mouth daily 30 tablet 0    cephalexin (KEFLEX) 500 mg capsule Take 1 capsule (500 mg total) by mouth every 6 (six) hours for 7 days (Patient not taking: Reported on 8/10/2024) 28 capsule 0       Active Ambulatory Problems     Diagnosis Date Noted    Alcohol use disorder, severe, dependence (HCC) 2024    Anxiety and depression 2024     Resolved Ambulatory Problems      "Diagnosis Date Noted    Alcohol withdrawal syndrome with complication (HCC) 04/24/2024    Elevated blood pressure reading 04/24/2024    Hypomagnesemia 04/25/2024    Hypokalemia 05/28/2024     Past Medical History:   Diagnosis Date    Depression     ETOH abuse     Fatty liver     Hypertension        Past Surgical History:   Procedure Laterality Date    ADENOIDECTOMY      TONSILLECTOMY         Social History:   Social History     Socioeconomic History    Marital status: Legally      Spouse name: Not on file    Number of children: Not on file    Years of education: Not on file    Highest education level: Not on file   Occupational History    Not on file   Tobacco Use    Smoking status: Never    Smokeless tobacco: Never   Vaping Use    Vaping status: Never Used   Substance and Sexual Activity    Alcohol use: Yes     Alcohol/week: 32.0 standard drinks of alcohol     Types: 12 Cans of beer, 20 Shots of liquor per week    Drug use: Not Currently     Types: Cocaine, \"Crack\" cocaine, Methamphetamines    Sexual activity: Not on file   Other Topics Concern    Not on file   Social History Narrative    Not on file     Social Determinants of Health     Financial Resource Strain: Low Risk  (5/5/2024)    Received from [facility]    Overall Financial Resource Strain (CARDIA)     Difficulty of Paying Living Expenses: Not very hard   Food Insecurity: No Food Insecurity (5/28/2024)    Hunger Vital Sign     Worried About Running Out of Food in the Last Year: Never true     Ran Out of Food in the Last Year: Never true   Recent Concern: Food Insecurity - Food Insecurity Present (5/5/2024)    Received from [facility]    Hunger Vital Sign     Worried About Running Out of Food in the Last Year: Never true     Ran Out of Food in the Last Year: Sometimes true   Transportation Needs: No Transportation Needs (5/28/2024)    PRAPARE - Transportation     Lack of Transportation (Medical): No     Lack of " Transportation (Non-Medical): No   Recent Concern: Transportation Needs - Unmet Transportation Needs (4/25/2024)    PRAPARE - Transportation     Lack of Transportation (Medical): Yes     Lack of Transportation (Non-Medical): Yes   Physical Activity: Not on file   Stress: Stress Concern Present (5/5/2024)    Received from [facility]    [employer] [place] [employer] - Occupational Stress Questionnaire     Feeling of Stress : To some extent   Social Connections: Feeling Somewhat Isolated (5/5/2024)    Received from [facility]    OASIS : Social Isolation     How often do you feel lonely or isolated from those around you?: Sometimes   Intimate Partner Violence: Not At Risk (5/28/2024)    Humiliation, Afraid, Rape, and Kick questionnaire     Fear of Current or Ex-Partner: No     Emotionally Abused: No     Physically Abused: No     Sexually Abused: No   Housing Stability: Low Risk  (5/28/2024)    Housing Stability Vital Sign     Unable to Pay for Housing in the Last Year: No     Number of Times Moved in the Last Year: 1     Homeless in the Last Year: No   Recent Concern: Housing Stability - High Risk (4/25/2024)    Housing Stability Vital Sign     Unable to Pay for Housing in the Last Year: Yes     Number of Places Lived in the Last Year: 3     Unstable Housing in the Last Year: Yes       Family History: No family history on file.    Review of Systems   Constitutional:  Negative for chills, fatigue and fever.   HENT: Negative.     Eyes: Negative.    Respiratory:  Negative for cough and shortness of breath.    Cardiovascular:  Negative for chest pain and palpitations.   Gastrointestinal:  Negative for abdominal pain, constipation, diarrhea and nausea.   Genitourinary: Negative.    Musculoskeletal:  Negative for arthralgias.   Skin:  Positive for wound.   Neurological:  Negative for dizziness, light-headedness and headaches.       Physical Exam   Vitals: Blood pressure  "120/75, pulse 63, temperature 98 °F (36.7 °C), temperature source Temporal, resp. rate 18, height 5' 8\" (1.727 m), weight 78.3 kg (172 lb 9.6 oz), SpO2 98%.,Body mass index is 26.24 kg/m².  Constitutional: Awake, alert, in no acute distress.  Head: Normocephalic and atraumatic.   Mouth/Throat: Oropharynx is clear and moist.    Eyes: Conjunctivae and EOM are normal.   Neck: Neck supple. No thyromegaly present.   Cardiovascular: Normal rate, regular rhythm and normal heart sounds.    Pulmonary/Chest: Effort normal and breath sounds normal.   Abdominal: Soft. Bowel sounds are normal. There is no tenderness. There is no rebound and no guarding.   Neurological: No focal deficits.   Musculoskeletal:  Nontender spine.  Skin: Skin is warm and dry. No edema.   R shin with 3 sutures.  Skin appears red, swollen, tender.  No drainage.     Assessment     Dayton Daley is a(n) 54 y.o. male with MDD.    Hypertension.  Patient not currently taking medication.  Continue to monitor BP.  Alcohol use disorder.  Start patient on thiamine, folic acid, and multivitamin daily.   Laceration R shin w/ mild cellulitis.  3 sutures placed 8/3, incomplete ED note.  Remove sutures around 8/13 (~10 days).  Start Kelflex 500 mg TID x 7 days.  Keep area clean and covered.    L ankle blister.  No signs of infection.  May apply Band-Aid daily.   MDD.  Managed by psych team.     Prognosis: Fair.    Discharge Plan: In progress.    Advanced Directives: I have discussed in detail with the patient the advanced directives. The patient does not have an appointed POA or living will. Emergency contact is father, Maninder Daley, [phone].  When discussing cardiac and pulmonary resuscitation efforts with the patient, the patient wishes to be  FULL CODE.    I have spent more than 50 minutes gathering data, doing physical examination, and discussing the advanced directives, which was witnessed by caring staff.    The patient was discussed with Dr. Rubio and he " is in agreement with the above note.

## 2024-08-11 NOTE — NURSING NOTE
Patient pleasant, calm, cooperative throughout the evening. Endorses mild anxiety. Denies all else. Able to make needs known.

## 2024-08-11 NOTE — CMS CERTIFICATION NOTE
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  I further attest that an established written individualized plan of care has been implemented and is outlined in the patient's medical records.

## 2024-08-11 NOTE — TREATMENT PLAN
TREATMENT PLAN REVIEW - Behavioral Health Dayton Daley 54 y.o. 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)  Active Problems:    Alcohol use disorder, severe, dependence (HCC)    Anxiety and depression      Patient Strengths/Assets: ability for insight, average or above intelligence, cooperative, communication skills, general fund of knowledge, interpersonal skills, motivation for treatment/growth, negotiates basic needs, patient is on a voluntary commitment, patient is willing to work on problems      Patient Barriers/Limitations: homeless, lack of financial means, lack of social/family support, alcohol abuse       Short Term Goals: decrease in depressive symptoms, sleep improvement, improvement in appetite, mood stabilization    Long Term Goals: improvement in depression, resolution of depressive symptoms, adequate oral intake, appropriate interaction with family, stable living arrangements upon discharge, establishment of family support upon discharge    Progress Towards Goals: continue alcohol detoxification protocol with gradual taper, participates in milieu therapy, depression is slowly improving, no longer suicidal    Recommended Treatment: medication management, patient medication education, group therapy, milieu therapy, continued Behavioral Health psychiatric evaluation/assessment process    Treatment Frequency: daily medication monitoring, group and milieu therapy daily, monitoring through interdisciplinary rounds, monitoring through weekly patient care conferences    Expected Discharge Date:  TBD    Discharge  Plan: placement in inpatient drug and alcohol rehabilitation program, referral for outpatient drug and alcohol counseling, referrals as indicated    Treatment Plan Created/Updated By: Hernesto Kahn MD

## 2024-08-11 NOTE — TREATMENT TEAM
08/10/24 1700   CIWA-Ar   Nausea and Vomiting 0   Tactile Disturbances 0   Tremor 0   Auditory Disturbances 0   Paroxysmal Sweats 0   Visual Disturbances 0   Anxiety 1   Headache, Fullness in Head 0   Agitation 0   Orientation and Clouding of Sensorium 0   CIWA-Ar Total 1

## 2024-08-12 LAB
25(OH)D3 SERPL-MCNC: 46.5 NG/ML (ref 30–100)
VIT B12 SERPL-MCNC: 283 PG/ML (ref 180–914)

## 2024-08-12 PROCEDURE — 99232 SBSQ HOSP IP/OBS MODERATE 35: CPT | Performed by: STUDENT IN AN ORGANIZED HEALTH CARE EDUCATION/TRAINING PROGRAM

## 2024-08-12 RX ORDER — VENLAFAXINE HYDROCHLORIDE 37.5 MG/1
37.5 CAPSULE, EXTENDED RELEASE ORAL DAILY
Status: DISCONTINUED | OUTPATIENT
Start: 2024-08-12 | End: 2024-08-14

## 2024-08-12 RX ORDER — CYANOCOBALAMIN 1000 UG/ML
1000 INJECTION, SOLUTION INTRAMUSCULAR; SUBCUTANEOUS
Status: DISCONTINUED | OUTPATIENT
Start: 2024-08-13 | End: 2024-08-19 | Stop reason: HOSPADM

## 2024-08-12 RX ADMIN — CEPHALEXIN 500 MG: 250 CAPSULE ORAL at 14:30

## 2024-08-12 RX ADMIN — CEPHALEXIN 500 MG: 250 CAPSULE ORAL at 06:07

## 2024-08-12 RX ADMIN — THIAMINE HCL TAB 100 MG 100 MG: 100 TAB at 09:13

## 2024-08-12 RX ADMIN — Medication 1 TABLET: at 09:13

## 2024-08-12 RX ADMIN — GABAPENTIN 100 MG: 100 CAPSULE ORAL at 16:25

## 2024-08-12 RX ADMIN — GABAPENTIN 100 MG: 100 CAPSULE ORAL at 21:16

## 2024-08-12 RX ADMIN — VENLAFAXINE HYDROCHLORIDE 37.5 MG: 37.5 CAPSULE, EXTENDED RELEASE ORAL at 16:25

## 2024-08-12 RX ADMIN — GABAPENTIN 100 MG: 100 CAPSULE ORAL at 09:13

## 2024-08-12 RX ADMIN — FOLIC ACID 1 MG: 1 TABLET ORAL at 09:13

## 2024-08-12 RX ADMIN — CEPHALEXIN 500 MG: 250 CAPSULE ORAL at 21:16

## 2024-08-12 NOTE — PROGRESS NOTES
08/12/24    Team Meeting   Meeting Type Daily Rounds   Team Members Present   Team Members Present Physician;Nurse;Occupational Therapist;   Physician Team Member Trinidad RODRIGUES   Nursing Team Member Deep RN   Social Work Team Member Gerald BERNSTEIN   OT Team Member Neda CTRS   Patient/Family Present   Patient Present No   Patient's Family Present No   New admit, 201, wound on leg, pleasant, AUD, pleasant, vitals/labs stable, dep, no d/c date

## 2024-08-12 NOTE — PROGRESS NOTES
"Progress Note - Dayton Daley 54 y.o. male MRN: [id number]    Unit/Bed#: [facility] 203-02 Encounter: [id number]        Subjective:   Patient seen and examined at bedside after reviewing the chart and discussing the case with the caring staff.      Patient examined at bedside.  Patient has no acute symptoms.    Physical Exam   Vitals: Blood pressure 118/59, pulse 61, temperature 97.5 °F (36.4 °C), temperature source Temporal, resp. rate 18, height 5' 8\" (1.727 m), weight 78.3 kg (172 lb 9.6 oz), SpO2 98%.,Body mass index is 26.24 kg/m².  Constitutional: Patient in no acute distress.  HEENT: PERR, EOMI, MMM.  Cardiovascular: Normal rate and regular rhythm.    Pulmonary/Chest: Effort normal and breath sounds normal.   Abdomen: Soft, + BS, NT.    Assessment/Plan:  Dayton Daley is a(n) 54 y.o. male with MDD.     Hypertension.  Patient not currently taking medication.  Continue to monitor BP.  Alcohol use disorder.  Patient started on thiamine, folic acid, and multivitamin daily.   Laceration R shin w/ mild cellulitis.  3 sutures placed 8/3/24 per ED note.  Remove sutures around 8/13 (~10 days).  Started on Kelflex 500 mg TID x 7 days (thru 8/17).  Keep area clean and covered.    L ankle blister.  No signs of infection.  May apply Band-Aid daily.   Vitamin B12 deficiency.  Patient started vitamin B12 monthly injections.    The patient was discussed with Dr. Rubio and he is in agreement with the above note.  "

## 2024-08-12 NOTE — PROGRESS NOTES
08/12/24 1400   Team Meeting   Meeting Type Tx Team Meeting   Initial Conference Date 08/12/24   Next Conference Date 09/12/24   Team Members Present   Team Members Present Physician;Nurse;   Physician Team Member Dr. Matilde Abdi   Nursing Team Member Sophia Adame Rn   Care Management Team Member Sera Ellis RN   Patient/Family Present   Patient Present Yes   Patient's Family Present No     Initial Plan  Treatment Team met and reviewed patient strengths, limitations, coping skills, Treatment Plan and Goals; patient reported understanding and agreement and signed the Treatment Plan document. A copy was placed on the chart.

## 2024-08-12 NOTE — NURSING NOTE
Patient med and meal compliant visible in on unit calm pleasant cooperative. Denies SI HI AVH and depression, endorses mild anxiety. Makes needs known, no complaints of pain No signs of alcohol withdrawal. Safety checks maintained.

## 2024-08-12 NOTE — PLAN OF CARE
Problem: DISCHARGE PLANNING - CARE MANAGEMENT  Goal: Discharge to post-acute care or home with appropriate resources  Outcome: Progressing  Patient is new to unit; 201; Initial Goal added.

## 2024-08-12 NOTE — PLAN OF CARE
Problem: Ineffective Coping  Goal: Participates in unit activities  Description: Interventions:  - Provide therapeutic environment   - Provide required programming   - Redirect inappropriate behaviors   Outcome: Progressing      Accession #: [id number] Accession #: [id number]

## 2024-08-12 NOTE — CASE MANAGEMENT
"KIAH placed call to patient's PCP, Dr. Lu: [phone] for admission notification. Spoke with office staff who indicated that patient's next office appt. Is Wednesday, 8/21/24 at 0840. Appt will be kept. CM will notify office is rescheduling is required due to continued stay.     KIAH placed call to patient's mother, Kaley: [phone] for family notification. Spoke with patient's mother, who expressed appreciation for the call and concern that the patient be aware of her support. \"Want him to know we haven't given up on him.\"   Patient's current status and plan reviewed.  The mother was in agreement with all. Relevant phone numbers were provided for future contact as needed. She had no further questions or concerns. The call ended mutually.     KIAH placed call to patient's PO, Jackson Lara Adult Probations: [phone] for admission notification. CM left VM message with a phone number for return call if required when patient is discharged.  "
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
## 2024-08-12 NOTE — PROGRESS NOTES
Discussed with patient: AUDIT score of 43 UDS/Identified Substance(s) used: Alcohol  Risks discussed included: Physical/ Mental Health Risks  Recommendations discussed: IP/OP D&A Services  Patient's response: Patient agreeable to Referral for D&A treatment, though uncertain of level at this time.

## 2024-08-13 PROCEDURE — 99232 SBSQ HOSP IP/OBS MODERATE 35: CPT | Performed by: STUDENT IN AN ORGANIZED HEALTH CARE EDUCATION/TRAINING PROGRAM

## 2024-08-13 RX ORDER — GINSENG 100 MG
1 CAPSULE ORAL 2 TIMES DAILY
Status: DISCONTINUED | OUTPATIENT
Start: 2024-08-13 | End: 2024-08-14

## 2024-08-13 RX ADMIN — GABAPENTIN 100 MG: 100 CAPSULE ORAL at 08:20

## 2024-08-13 RX ADMIN — BACITRACIN ZINC 1 SMALL APPLICATION: 500 OINTMENT TOPICAL at 10:52

## 2024-08-13 RX ADMIN — FOLIC ACID 1 MG: 1 TABLET ORAL at 08:20

## 2024-08-13 RX ADMIN — CYANOCOBALAMIN 1000 MCG: 1000 INJECTION, SOLUTION INTRAMUSCULAR; SUBCUTANEOUS at 10:52

## 2024-08-13 RX ADMIN — VENLAFAXINE HYDROCHLORIDE 37.5 MG: 37.5 CAPSULE, EXTENDED RELEASE ORAL at 08:20

## 2024-08-13 RX ADMIN — THIAMINE HCL TAB 100 MG 100 MG: 100 TAB at 08:19

## 2024-08-13 RX ADMIN — CEPHALEXIN 500 MG: 250 CAPSULE ORAL at 05:48

## 2024-08-13 RX ADMIN — GABAPENTIN 100 MG: 100 CAPSULE ORAL at 15:28

## 2024-08-13 RX ADMIN — BACITRACIN ZINC 1 SMALL APPLICATION: 500 OINTMENT TOPICAL at 15:28

## 2024-08-13 RX ADMIN — CEPHALEXIN 500 MG: 250 CAPSULE ORAL at 15:28

## 2024-08-13 RX ADMIN — Medication 1 TABLET: at 08:20

## 2024-08-13 RX ADMIN — GABAPENTIN 100 MG: 100 CAPSULE ORAL at 21:26

## 2024-08-13 RX ADMIN — CEPHALEXIN 500 MG: 250 CAPSULE ORAL at 21:26

## 2024-08-13 NOTE — PLAN OF CARE
Problem: ANXIETY  Goal: Will report anxiety at manageable levels  Description: INTERVENTIONS:  - Administer medication as ordered  - Teach and encourage coping skills  - Provide emotional support  - Assess patient/family for anxiety and ability to cope  Outcome: Progressing     Problem: Ineffective Coping  Goal: Participates in unit activities  Description: Interventions:  - Provide therapeutic environment   - Provide required programming   - Redirect inappropriate behaviors   Outcome: Progressing     Problem: Risk for Self Injury/Neglect  Goal: Verbalize thoughts and feelings  Description: Interventions:  - Assess and re-assess patient's lethality and potential for self-injury  - Engage patient in 1:1 interactions, daily, for a minimum of 15 minutes  - Encourage patient to express feelings, fears, frustrations, hopes  - Establish rapport/trust with patient   Outcome: Progressing     Problem: Risk for Self Injury/Neglect  Goal: Refrain from harming self  Description: Interventions:  - Monitor patient closely, per order  - Develop a trusting relationship  - Supervise medication ingestion, monitor effects and side effects   Outcome: Progressing     Problem: Depression  Goal: Complete daily ADLs, including personal hygiene independently, as able  Description: Interventions:  - Observe, teach, and assist patient with ADLS  -  Monitor and promote a balance of rest/activity, with adequate nutrition and elimination   Outcome: Progressing

## 2024-08-13 NOTE — NURSING NOTE
Presents with bright affect,endorses mild anxiety:denies depression,SI,HI,AH,VH.He is visible on unit,converses freely with both staff and peers,makes needs known,is compliant with medications,unit rules,appetite is good,is independent with ambulation and ADLs.WE discussed s/s of ETOH withdrawal and to report to staff if occur.Will continue to educate,monitor,and provide safe,therapeutic milieu.

## 2024-08-13 NOTE — PROGRESS NOTES
Progress Note - Behavioral Health   Dayton Daley 54 y.o. 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)  Active Problems:    Alcohol use disorder, severe, dependence (HCC)    Anxiety and depression      Plan    Continue  Effexor 37.5 for depressive symptoms  All current active medications have been reviewed  Encourage group therapy, milieu therapy and occupational therapy  Behavioral Health checks every 7 minutes  Plan for inpatient Drug and Alcohol rehabilitation placement when psychiatrically stable  Requires continued inpatient treatment due to chronic illness and high risk of decompensation if discharged before long term stability is achieved  Requires continued inpatient treatment while awaiting placement due to chronic illness and high risk of decompensation if discharged to an unstructured environment  Medical management per SLIM.  Discharge Planning    Commitment Status: 201  ----------------------------------------      Subjective: Patient discussed in nursing report and overnight notes and charting reviewed. Per nursing report, patient is calm and cooperative.  He takes his medications as prescribed and denies medication side effects.  He denies any withdrawal symptoms of alcohol.  He started Effexor and will titrate the medication as tolerated     Behavior over the last 24 hours:  improved  Sleep: normal  Appetite: normal  Medication side effects: No  ROS: all other systems are negative    Mental Status Evaluation:  Appearance:  age appropriate, casually dressed   Behavior:  pleasant, cooperative   Speech:  normal rate and volume   Mood:  improved   Affect:  normal range and intensity   Thought Process:  coherent   Associations: intact associations   Thought Content:  no overt delusions   Perceptual Disturbances: no auditory hallucinations, no visual hallucinations    Risk Potential: Suicidal ideation - None at present  Homicidal ideation - None at present  Potential for aggression - No   Sensorium:  oriented to person, place, and time/date   Memory:  recent and remote memory grossly intact   Consciousness:  alert and awake   Attention/Concentration: attention span and concentration are age appropriate   Insight:  partial   Judgment: partial   Gait/Station: normal gait/station   Motor Activity: no abnormal movements     Medications: all current active meds have been reviewed, continue current psychiatric medications, and current meds:   Current Facility-Administered Medications   Medication Dose Route Frequency    acetaminophen (TYLENOL) tablet 650 mg  650 mg Oral Q4H PRN    acetaminophen (TYLENOL) tablet 650 mg  650 mg Oral Q4H PRN    acetaminophen (TYLENOL) tablet 975 mg  975 mg Oral Q6H PRN    bacitracin topical ointment 1 small application  1 small application Topical BID    benztropine (COGENTIN) tablet 0.5 mg  0.5 mg Oral Q4H PRN Max 6/day    cephalexin (KEFLEX) capsule 500 mg  500 mg Oral Q8H DOROTA    cyanocobalamin injection 1,000 mcg  1,000 mcg Intramuscular Q30 Days    folic acid (FOLVITE) tablet 1 mg  1 mg Oral Daily    gabapentin (NEURONTIN) capsule 100 mg  100 mg Oral TID    hydrOXYzine HCL (ATARAX) tablet 25 mg  25 mg Oral Q6H PRN Max 4/day    hydrOXYzine HCL (ATARAX) tablet 50 mg  50 mg Oral Q6H PRN Max 4/day    LORazepam (ATIVAN) injection 1 mg  1 mg Intramuscular Q6H PRN Max 3/day    LORazepam (ATIVAN) tablet 1 mg  1 mg Oral Q6H PRN Max 3/day    multivitamin-minerals (CENTRUM) tablet 1 tablet  1 tablet Oral Daily    OLANZapine (ZyPREXA) IM injection 5 mg  5 mg Intramuscular Q3H PRN Max 3/day    OLANZapine (ZyPREXA) tablet 2.5 mg  2.5 mg Oral Q4H PRN Max 6/day    OLANZapine (ZyPREXA) tablet 5 mg  5 mg Oral Q4H PRN Max 3/day    OLANZapine (ZyPREXA) tablet 5 mg  5 mg Oral Q3H PRN Max 3/day    senna-docusate sodium (SENOKOT S) 8.6-50 mg per tablet 1 tablet  1 tablet  Oral Daily PRN    thiamine tablet 100 mg  100 mg Oral Daily    traZODone (DESYREL) tablet 50 mg  50 mg Oral HS PRN    venlafaxine (EFFEXOR-XR) 24 hr capsule 37.5 mg  37.5 mg Oral Daily   .  Current Facility-Administered Medications   Medication Dose Route Frequency Provider Last Rate    acetaminophen  650 mg Oral Q4H PRN Kirt Kokolus, CRNP      acetaminophen  650 mg Oral Q4H PRN Kirt Dickkolus, CRNP      acetaminophen  975 mg Oral Q6H PRN Kirt Dickkolus, CRROBYN      bacitracin  1 small application Topical BID Karen Nickerson PA-C      benztropine  0.5 mg Oral Q4H PRN Max 6/day Kirt Manley, RAVI      cephalexin  500 mg Oral Q8H DOROTA Tatum Fields PA-C      cyanocobalamin  1,000 mcg Intramuscular Q30 Days Karen Nickerson PA-C      folic acid  1 mg Oral Daily Tatum Fields PA-C      gabapentin  100 mg Oral TID Hernesto Kahn MD      hydrOXYzine HCL  25 mg Oral Q6H PRN Max 4/day Kirt Kokolus, CRNP      hydrOXYzine HCL  50 mg Oral Q6H PRN Max 4/day Kirt Kokolus, CRNP      LORazepam  1 mg Intramuscular Q6H PRN Max 3/day Kirt Kokolus, CRNP      LORazepam  1 mg Oral Q6H PRN Max 3/day Kirt Kokolus, CRNP      multivitamin-minerals  1 tablet Oral Daily Tatum Fields PA-C      OLANZapine  5 mg Intramuscular Q3H PRN Max 3/day Kirt Kokolus, CRNP      OLANZapine  2.5 mg Oral Q4H PRN Max 6/day Kirt Kokolus, CRNP      OLANZapine  5 mg Oral Q4H PRN Max 3/day Kirt Kokolus, CRNP      OLANZapine  5 mg Oral Q3H PRN Max 3/day Kirt Kokolus, CRNP      senna-docusate sodium  1 tablet Oral Daily PRN Kirt Kokolus, CRNP      thiamine  100 mg Oral Daily Tatum Fields PA-C      traZODone  50 mg Oral HS PRN Kirt Gardiners, RAVI      venlafaxine  37.5 mg Oral Daily Matilde Abdi MD         Labs: I have personally reviewed all pertinent laboratory/tests results  Most Recent Labs:     Results from the past 24 hours: No results found  for this or any previous visit (from the past 24 hour(s)).  Most Recent Labs:   Lab Results   Component Value Date    WBC 5.29 08/11/2024    RBC 3.98 08/11/2024    HGB 13.1 08/11/2024    HCT 39.4 08/11/2024     08/11/2024     08/11/2024    RDW 12.8 08/11/2024    NEUTROABS 2.60 08/11/2024    SODIUM 138 08/11/2024    K 3.8 08/11/2024     08/11/2024    CO2 28 08/11/2024    BUN 9 08/11/2024    CREATININE 0.84 08/11/2024    GLUC 108 08/11/2024    CALCIUM 8.8 08/11/2024    AST 14 08/11/2024    ALT 14 08/11/2024    ALKPHOS 47 08/11/2024    TP 6.2 (L) 08/11/2024    ALB 3.7 08/11/2024    TBILI 0.35 08/11/2024    CHOLESTEROL 157 08/11/2024    HDL 51 08/11/2024    TRIG 80 08/11/2024    LDLCALC 90 08/11/2024    NONHDLC 106 08/11/2024    [id number] 2.382 08/11/2024    HGBA1C 5.9 (H) 07/22/2021     07/22/2021     Last Laboratory Results with Depakote and/or Tegretol levels:   Lab Results   Component Value Date    WBC 5.29 08/11/2024    RBC 3.98 08/11/2024    HGB 13.1 08/11/2024    HCT 39.4 08/11/2024     08/11/2024     08/11/2024    RDW 12.8 08/11/2024    NEUTROABS 2.60 08/11/2024    SODIUM 138 08/11/2024    K 3.8 08/11/2024     08/11/2024    CO2 28 08/11/2024    BUN 9 08/11/2024    CREATININE 0.84 08/11/2024    GLUC 108 08/11/2024    CALCIUM 8.8 08/11/2024    AST 14 08/11/2024    ALT 14 08/11/2024    ALKPHOS 47 08/11/2024    TP 6.2 (L) 08/11/2024    ALB 3.7 08/11/2024    TBILI 0.35 08/11/2024     Last Laboratory Results with Lithium level:   Lab Results   Component Value Date    SODIUM 138 08/11/2024    K 3.8 08/11/2024     08/11/2024    CO2 28 08/11/2024    BUN 9 08/11/2024    CREATININE 0.84 08/11/2024    GLUC 108 08/11/2024    CALCIUM 8.8 08/11/2024     Labs in last 72 hours:   Recent Labs     08/11/24  0537   WBC 5.29   RBC 3.98   HGB 13.1   HCT 39.4     185   RDW 12.8   NEUTROABS 2.60   SODIUM 138   K 3.8      CO2 28   BUN 9   CREATININE 0.84   GLUC 108    CALCIUM 8.8   AST 14   ALT 14   ALKPHOS 47   TP 6.2*   ALB 3.7   TBILI 0.35   CHOLESTEROL 157   HDL 51   TRIG 80   LDLCALC 90   [id number] 2.382     Admission Labs:   Admission on 08/10/2024   Component Date Value    Magnesium 08/09/2024 2.3     Sodium 08/11/2024 138     Potassium 08/11/2024 3.8     Chloride 08/11/2024 105     CO2 08/11/2024 28     ANION GAP 08/11/2024 5     BUN 08/11/2024 9     Creatinine 08/11/2024 0.84     Glucose 08/11/2024 108     Glucose, Fasting 08/11/2024 108 (H)     Calcium 08/11/2024 8.8     AST 08/11/2024 14     ALT 08/11/2024 14     Alkaline Phosphatase 08/11/2024 47     Total Protein 08/11/2024 6.2 (L)     Albumin 08/11/2024 3.7     Total Bilirubin 08/11/2024 0.35     eGFR 08/11/2024 97     WBC 08/11/2024 5.29     RBC 08/11/2024 3.98     Hemoglobin 08/11/2024 13.1     Hematocrit 08/11/2024 39.4     MCV 08/11/2024 99 (H)     MCH 08/11/2024 32.9     MCHC 08/11/2024 33.2     RDW 08/11/2024 12.8     MPV 08/11/2024 10.9     Platelets 08/11/2024 185     nRBC 08/11/2024 0     Segmented % 08/11/2024 49     Immature Grans % 08/11/2024 0     Lymphocytes % 08/11/2024 33     Monocytes % 08/11/2024 13 (H)     Eosinophils Relative 08/11/2024 4     Basophils Relative 08/11/2024 1     Absolute Neutrophils 08/11/2024 2.60     Absolute Immature Grans 08/11/2024 0.01     Absolute Lymphocytes 08/11/2024 1.73     Absolute Monocytes 08/11/2024 0.71     Eosinophils Absolute 08/11/2024 0.20     Basophils Absolute 08/11/2024 0.04     TSH 3RD GENERATON 08/11/2024 2.382     Cholesterol 08/11/2024 157     Triglycerides 08/11/2024 80     HDL, Direct 08/11/2024 51     LDL Calculated 08/11/2024 90     Non-HDL-Chol (CHOL-HDL) 08/11/2024 106     PTT 08/11/2024 33     Protime 08/11/2024 12.3     Thrombin Time Mixing Iliana* 08/11/2024      Thrombin Time Mix[employer]* 08/11/2024      Thrombin Time 08/11/2024 16.8     Platelets 08/11/2024 185     MPV 08/11/2024 10.9     Protime 08/11/2024 12.8     INR 08/11/2024 0.91      PTT 08/11/2024 34     Fibrinogen 08/11/2024 350     Vit D, 25-Hydroxy 08/11/2024 46.5     Vitamin B-12 08/11/2024 283        Progress Toward Goals: improving    Risks / Benefits of Treatment:    Risks, benefits, and possible side effects of medications explained to patient and patient verbalizes understanding and agreement for treatment.    Counseling / Coordination of Care:    Total floor / unit time spent today 15 minutes. Greater than 50% of total time was spent with the patient and / or family counseling and / or coordination of care.     A description of counseling / coordination of care:  Patient's progress discussed with staff in treatment team meeting.  Treatment plan, treatment progress and medication changes were reviewed with nursing staff, pharmacy service, and case management in interdisciplinary treatment team meeting.  Medications, treatment progress and treatment plan reviewed with patient.  Recent stressors including alcohol use problems discussed with patient.  Educated on importance of medication and treatment compliance.  Reassurance and supportive therapy provided.  Encouraged participation in milieu and group therapy on the unit.      Matilde Abdi MD 08/13/24

## 2024-08-13 NOTE — NURSING NOTE
Patient med and meal compliant visible on unit calm pleasant cooperative social with peers attends group denies SI HI AVH anxiety and depression at this time Safety checks maintained.

## 2024-08-13 NOTE — NURSING NOTE
Patient has been observed sleeping on the majority of the continual safety rounds. Patient shows no signs/symptoms of distress;non-labored breathing. Continued care and continual safety rounds in progress.

## 2024-08-13 NOTE — NURSING NOTE
Patient visible in milieu with peers, medication compliant and pleasant upon approach. Patient attended all group therapies. Patient denies all, no anxiety, no depression, no SI/HI or hallucinations. No signs of alcohol withdrawal. Continued care and continual safety checks in progress.

## 2024-08-13 NOTE — PROGRESS NOTES
"Progress Note - Dayton Daley 54 y.o. male MRN: [id number]    Unit/Bed#: -02 Encounter: [id number]        Subjective:   Patient seen and examined at bedside after reviewing the chart and discussing the case with the caring staff.      Patient examined at bedside.  Patient has no acute symptoms.  He would like his sutures removed.    Physical Exam   Vitals: Blood pressure 115/67, pulse 76, temperature 97.8 °F (36.6 °C), temperature source Temporal, resp. rate 18, height 5' 8\" (1.727 m), weight 78.3 kg (172 lb 9.6 oz), SpO2 96%.,Body mass index is 26.24 kg/m².  Constitutional: Patient in no acute distress.  HEENT: PERR, EOMI, MMM.  Cardiovascular: Normal rate and regular rhythm.    Pulmonary/Chest: Effort normal and breath sounds normal.   Abdomen: Soft, + BS, NT.    Assessment/Plan:  Dayton Daley is a(n) 54 y.o. male with MDD.     Hypertension.  Patient not currently taking medication.  Continue to monitor BP.  Alcohol use disorder.  Patient started on thiamine, folic acid, and multivitamin daily.   Laceration R shin with mild cellulitis.  3 sutures placed 8/3/24 per ED note.  Started on Kelflex 500 mg TID x 7 days (thru 8/17).  2 sutures removed today without issues.  Large scab preventing last suture to be removed.  Apply bacitracin BID and retry tomorrow.   L ankle blister.  No signs of infection.  May apply Band-Aid daily.   Vitamin B12 deficiency.  Patient started vitamin B12 monthly injections.    The patient was discussed with Dr. Rubio and he is in agreement with the above note.  "

## 2024-08-13 NOTE — PROGRESS NOTES
08/13/24    Team Meeting   Meeting Type Daily Rounds   Team Members Present   Team Members Present Physician;Nurse;Occupational Therapist;   Physician Team Member Trinidad RODRIGUES   Nursing Team Member Deep RN   Social Work Team Member Gerald BERNSTEIN   OT Team Member Neda CTRS   Patient/Family Present   Patient Present No   Patient's Family Present No   Pleasant, cooperative, denies all, med complaint, attends group, AUD, d&a, no d/c date

## 2024-08-13 NOTE — PROGRESS NOTES
Progress Note - Behavioral Health   Dayton Daley 54 y.o. 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)  Active Problems:    Alcohol use disorder, severe, dependence (HCC)    Anxiety and depression      Plan    Started on Effexor 37.5 for depressive symptoms    All current active medications have been reviewed  Encourage group therapy, milieu therapy and occupational therapy  Behavioral Health checks every 7 minutes  Plan for inpatient Drug and Alcohol rehabilitation placement when psychiatrically stable  Requires continued inpatient treatment due to chronic illness and high risk of decompensation if discharged before long term stability is achieved  Requires continued inpatient treatment while awaiting placement due to chronic illness and high risk of decompensation if discharged to an unstructured environment  Medical management per SLIM.  Discharge Planning    Commitment Status: 201  ----------------------------------------      Subjective: Patient discussed in nursing report and overnight notes and charting reviewed. Per nursing report, patient is calm and cooperative.  He takes his medications as prescribed and denies medication side effects.  He denies any withdrawal symptoms of alcohol.  He reports that he only feels depressed when he drinks but I discussed with the patient the possibility of having dual diagnosis into possibility that he is self-medicating with alcohol for depressive symptoms.  He was treated before with Wellbutrin and Trileptal and prefers to start different antidepressant.  We will start on Effexor 37.5    Behavior over the last 24 hours:  improved  Sleep: normal  Appetite: normal  Medication side effects: No  ROS: all other systems are negative    Mental Status Evaluation:  Appearance:  age appropriate, casually dressed   Behavior:  pleasant,  cooperative   Speech:  normal rate and volume   Mood:  improved   Affect:  normal range and intensity   Thought Process:  coherent   Associations: intact associations   Thought Content:  no overt delusions   Perceptual Disturbances: no auditory hallucinations, no visual hallucinations   Risk Potential: Suicidal ideation - None at present  Homicidal ideation - None at present  Potential for aggression - No   Sensorium:  oriented to person, place, and time/date   Memory:  recent and remote memory grossly intact   Consciousness:  alert and awake   Attention/Concentration: attention span and concentration are age appropriate   Insight:  partial   Judgment: partial   Gait/Station: normal gait/station   Motor Activity: no abnormal movements     Medications: all current active meds have been reviewed, continue current psychiatric medications, and current meds:   Current Facility-Administered Medications   Medication Dose Route Frequency    acetaminophen (TYLENOL) tablet 650 mg  650 mg Oral Q4H PRN    acetaminophen (TYLENOL) tablet 650 mg  650 mg Oral Q4H PRN    acetaminophen (TYLENOL) tablet 975 mg  975 mg Oral Q6H PRN    benztropine (COGENTIN) tablet 0.5 mg  0.5 mg Oral Q4H PRN Max 6/day    cephalexin (KEFLEX) capsule 500 mg  500 mg Oral Q8H DOROTA    [START ON 8/13/2024] cyanocobalamin injection 1,000 mcg  1,000 mcg Intramuscular Q30 Days    folic acid (FOLVITE) tablet 1 mg  1 mg Oral Daily    gabapentin (NEURONTIN) capsule 100 mg  100 mg Oral TID    hydrOXYzine HCL (ATARAX) tablet 25 mg  25 mg Oral Q6H PRN Max 4/day    hydrOXYzine HCL (ATARAX) tablet 50 mg  50 mg Oral Q6H PRN Max 4/day    LORazepam (ATIVAN) injection 1 mg  1 mg Intramuscular Q6H PRN Max 3/day    LORazepam (ATIVAN) tablet 1 mg  1 mg Oral Q6H PRN Max 3/day    multivitamin-minerals (CENTRUM) tablet 1 tablet  1 tablet Oral Daily    OLANZapine (ZyPREXA) IM injection 5 mg  5 mg Intramuscular Q3H PRN Max 3/day    OLANZapine (ZyPREXA) tablet 2.5 mg  2.5 mg Oral  Q4H PRN Max 6/day    OLANZapine (ZyPREXA) tablet 5 mg  5 mg Oral Q4H PRN Max 3/day    OLANZapine (ZyPREXA) tablet 5 mg  5 mg Oral Q3H PRN Max 3/day    senna-docusate sodium (SENOKOT S) 8.6-50 mg per tablet 1 tablet  1 tablet Oral Daily PRN    thiamine tablet 100 mg  100 mg Oral Daily    traZODone (DESYREL) tablet 50 mg  50 mg Oral HS PRN    venlafaxine (EFFEXOR-XR) 24 hr capsule 37.5 mg  37.5 mg Oral Daily   .  Current Facility-Administered Medications   Medication Dose Route Frequency Provider Last Rate    acetaminophen  650 mg Oral Q4H PRN Kirt Dickkolus, CRNP      acetaminophen  650 mg Oral Q4H PRN Kirt Gardiners, CRNP      acetaminophen  975 mg Oral Q6H PRN Kirt Gardiners, CRNP      benztropine  0.5 mg Oral Q4H PRN Max 6/day RAVI Jerome      cephalexin  500 mg Oral Q8H [facility] Tatum Fields PA-C      [START ON 8/13/2024] cyanocobalamin  1,000 mcg Intramuscular Q30 Days Karen Nickerson PA-C      folic acid  1 mg Oral Daily Tatum Fields PA-C      gabapentin  100 mg Oral TID Hernesto Kahn MD      hydrOXYzine HCL  25 mg Oral Q6H PRN Max 4/day Kirt Blacklus, CRNP      hydrOXYzine HCL  50 mg Oral Q6H PRN Max 4/day Kirt Gardiners, RAVI      LORazepam  1 mg Intramuscular Q6H PRN Max 3/day Kirt Blacklus, RAVI      LORazepam  1 mg Oral Q6H PRN Max 3/day Kirt Blacklus, CRNP      multivitamin-minerals  1 tablet Oral Daily Tatum Fields PA-C      OLANZapine  5 mg Intramuscular Q3H PRN Max 3/day Kirt Gardiners, CRNP      OLANZapine  2.5 mg Oral Q4H PRN Max 6/day Kirt Blacklus, CRNP      OLANZapine  5 mg Oral Q4H PRN Max 3/day Kirt Blacklus, CRNP      OLANZapine  5 mg Oral Q3H PRN Max 3/day Kirt Blacklus, RAVI      senna-docusate sodium  1 tablet Oral Daily PRN RAVI Jerome      thiamine  100 mg Oral Daily Tatum Fields PA-C      traZODone  50 mg Oral HS PRN RAVI Jerome      venlafaxine  37.5 mg Oral Daily Matilde  Dangelo Abdi MD         Labs: I have personally reviewed all pertinent laboratory/tests results  Most Recent Labs:     Results from the past 24 hours: No results found for this or any previous visit (from the past 24 hour(s)).  Most Recent Labs:   Lab Results   Component Value Date    WBC 5.29 08/11/2024    RBC 3.98 08/11/2024    HGB 13.1 08/11/2024    HCT 39.4 08/11/2024     08/11/2024     08/11/2024    RDW 12.8 08/11/2024    NEUTROABS 2.60 08/11/2024    SODIUM 138 08/11/2024    K 3.8 08/11/2024     08/11/2024    CO2 28 08/11/2024    BUN 9 08/11/2024    CREATININE 0.84 08/11/2024    GLUC 108 08/11/2024    CALCIUM 8.8 08/11/2024    AST 14 08/11/2024    ALT 14 08/11/2024    ALKPHOS 47 08/11/2024    TP 6.2 (L) 08/11/2024    ALB 3.7 08/11/2024    TBILI 0.35 08/11/2024    CHOLESTEROL 157 08/11/2024    HDL 51 08/11/2024    TRIG 80 08/11/2024    LDLCALC 90 08/11/2024    NONHDLC 106 08/11/2024    [id number] 2.382 08/11/2024    HGBA1C 5.9 (H) 07/22/2021     07/22/2021     Last Laboratory Results with Depakote and/or Tegretol levels:   Lab Results   Component Value Date    WBC 5.29 08/11/2024    RBC 3.98 08/11/2024    HGB 13.1 08/11/2024    HCT 39.4 08/11/2024     08/11/2024     08/11/2024    RDW 12.8 08/11/2024    NEUTROABS 2.60 08/11/2024    SODIUM 138 08/11/2024    K 3.8 08/11/2024     08/11/2024    CO2 28 08/11/2024    BUN 9 08/11/2024    CREATININE 0.84 08/11/2024    GLUC 108 08/11/2024    CALCIUM 8.8 08/11/2024    AST 14 08/11/2024    ALT 14 08/11/2024    ALKPHOS 47 08/11/2024    TP 6.2 (L) 08/11/2024    ALB 3.7 08/11/2024    TBILI 0.35 08/11/2024     Last Laboratory Results with Lithium level:   Lab Results   Component Value Date    SODIUM 138 08/11/2024    K 3.8 08/11/2024     08/11/2024    CO2 28 08/11/2024    BUN 9 08/11/2024    CREATININE 0.84 08/11/2024    GLUC 108 08/11/2024    CALCIUM 8.8 08/11/2024     Labs in last 72 hours:   Recent Labs      08/11/24  0537   WBC 5.29   RBC 3.98   HGB 13.1   HCT 39.4     185   RDW 12.8   NEUTROABS 2.60   SODIUM 138   K 3.8      CO2 28   BUN 9   CREATININE 0.84   GLUC 108   CALCIUM 8.8   AST 14   ALT 14   ALKPHOS 47   TP 6.2*   ALB 3.7   TBILI 0.35   CHOLESTEROL 157   HDL 51   TRIG 80   LDLCALC 90   [id number] 2.382     Admission Labs:   Admission on 08/10/2024   Component Date Value    Magnesium 08/09/2024 2.3     Sodium 08/11/2024 138     Potassium 08/11/2024 3.8     Chloride 08/11/2024 105     CO2 08/11/2024 28     ANION GAP 08/11/2024 5     BUN 08/11/2024 9     Creatinine 08/11/2024 0.84     Glucose 08/11/2024 108     Glucose, Fasting 08/11/2024 108 (H)     Calcium 08/11/2024 8.8     AST 08/11/2024 14     ALT 08/11/2024 14     Alkaline Phosphatase 08/11/2024 47     Total Protein 08/11/2024 6.2 (L)     Albumin 08/11/2024 3.7     Total Bilirubin 08/11/2024 0.35     eGFR 08/11/2024 97     WBC 08/11/2024 5.29     RBC 08/11/2024 3.98     Hemoglobin 08/11/2024 13.1     Hematocrit 08/11/2024 39.4     MCV 08/11/2024 99 (H)     MCH 08/11/2024 32.9     MCHC 08/11/2024 33.2     RDW 08/11/2024 12.8     MPV 08/11/2024 10.9     Platelets 08/11/2024 185     nRBC 08/11/2024 0     Segmented % 08/11/2024 49     Immature Grans % 08/11/2024 0     Lymphocytes % 08/11/2024 33     Monocytes % 08/11/2024 13 (H)     Eosinophils Relative 08/11/2024 4     Basophils Relative 08/11/2024 1     Absolute Neutrophils 08/11/2024 2.60     Absolute Immature Grans 08/11/2024 0.01     Absolute Lymphocytes 08/11/2024 1.73     Absolute Monocytes 08/11/2024 0.71     Eosinophils Absolute 08/11/2024 0.20     Basophils Absolute 08/11/2024 0.04     TSH 3RD GENERATON 08/11/2024 2.382     Cholesterol 08/11/2024 157     Triglycerides 08/11/2024 80     HDL, Direct 08/11/2024 51     LDL Calculated 08/11/2024 90     Non-HDL-Chol (CHOL-HDL) 08/11/2024 106     PTT 08/11/2024 33     Protime 08/11/2024 12.3     Thrombin Time Mixing Iliana* 08/11/2024       Thrombin Time Mix[employer] Inc* 08/11/2024      Thrombin Time 08/11/2024 16.8     Platelets 08/11/2024 185     MPV 08/11/2024 10.9     Protime 08/11/2024 12.8     INR 08/11/2024 0.91     PTT 08/11/2024 34     Fibrinogen 08/11/2024 350     Vit D, 25-Hydroxy 08/11/2024 46.5     Vitamin B-12 08/11/2024 283        Progress Toward Goals: improving    Risks / Benefits of Treatment:    Risks, benefits, and possible side effects of medications explained to patient and patient verbalizes understanding and agreement for treatment.    Counseling / Coordination of Care:    Total floor / unit time spent today 30 minutes. Greater than 50% of total time was spent with the patient and / or family counseling and / or coordination of care.     A description of counseling / coordination of care:  Patient's progress discussed with staff in treatment team meeting.  Treatment plan, treatment progress and medication changes were reviewed with nursing staff, pharmacy service, and case management in interdisciplinary treatment team meeting.  Medications, treatment progress and treatment plan reviewed with patient.  Recent stressors including alcohol use problems discussed with patient.  Educated on importance of medication and treatment compliance.  Reassurance and supportive therapy provided.  Encouraged participation in milieu and group therapy on the unit.      Matilde Abdi MD 08/12/24

## 2024-08-14 PROBLEM — F33.2 SEVERE EPISODE OF RECURRENT MAJOR DEPRESSIVE DISORDER, WITHOUT PSYCHOTIC FEATURES (HCC): Status: ACTIVE | Noted: 2024-08-11

## 2024-08-14 PROCEDURE — 99232 SBSQ HOSP IP/OBS MODERATE 35: CPT | Performed by: STUDENT IN AN ORGANIZED HEALTH CARE EDUCATION/TRAINING PROGRAM

## 2024-08-14 RX ORDER — VENLAFAXINE HYDROCHLORIDE 75 MG/1
75 CAPSULE, EXTENDED RELEASE ORAL DAILY
Status: DISCONTINUED | OUTPATIENT
Start: 2024-08-15 | End: 2024-08-16

## 2024-08-14 RX ORDER — GINSENG 100 MG
1 CAPSULE ORAL 2 TIMES DAILY
Status: DISCONTINUED | OUTPATIENT
Start: 2024-08-14 | End: 2024-08-19 | Stop reason: HOSPADM

## 2024-08-14 RX ADMIN — VENLAFAXINE HYDROCHLORIDE 37.5 MG: 37.5 CAPSULE, EXTENDED RELEASE ORAL at 08:39

## 2024-08-14 RX ADMIN — CEPHALEXIN 500 MG: 250 CAPSULE ORAL at 15:32

## 2024-08-14 RX ADMIN — CEPHALEXIN 500 MG: 250 CAPSULE ORAL at 21:08

## 2024-08-14 RX ADMIN — GABAPENTIN 100 MG: 100 CAPSULE ORAL at 08:39

## 2024-08-14 RX ADMIN — GABAPENTIN 100 MG: 100 CAPSULE ORAL at 21:08

## 2024-08-14 RX ADMIN — Medication 1 TABLET: at 08:39

## 2024-08-14 RX ADMIN — BACITRACIN ZINC 1 SMALL APPLICATION: 500 OINTMENT TOPICAL at 08:39

## 2024-08-14 RX ADMIN — BACITRACIN ZINC 1 SMALL APPLICATION: 500 OINTMENT TOPICAL at 11:42

## 2024-08-14 RX ADMIN — BACITRACIN ZINC 1 SMALL APPLICATION: 500 OINTMENT TOPICAL at 17:21

## 2024-08-14 RX ADMIN — THIAMINE HCL TAB 100 MG 100 MG: 100 TAB at 08:39

## 2024-08-14 RX ADMIN — CEPHALEXIN 500 MG: 250 CAPSULE ORAL at 06:24

## 2024-08-14 RX ADMIN — FOLIC ACID 1 MG: 1 TABLET ORAL at 08:39

## 2024-08-14 RX ADMIN — GABAPENTIN 100 MG: 100 CAPSULE ORAL at 15:32

## 2024-08-14 NOTE — PROGRESS NOTES
Progress Note - Behavioral Health   Dayton Daley 54 y.o. male MRN: [id number]  Unit/Bed#: [facility] 203-02 Encounter: [id number]    Assessment   Principal Problem:    Severe episode of recurrent major depressive disorder, without psychotic features (HCC)  Active Problems:    Alcohol use disorder, severe, dependence (HCC)    Anxiety and depression      Plan    Increase Effexor to 75 mg starting tomorrow   All current active medications have been reviewed  Encourage group therapy, milieu therapy and occupational therapy  Behavioral Health checks every 7 minutes  Plan for inpatient Drug and Alcohol rehabilitation placement when psychiatrically stable  Requires continued inpatient treatment due to chronic illness and high risk of decompensation if discharged before long term stability is achieved  Requires continued inpatient treatment while awaiting placement due to chronic illness and high risk of decompensation if discharged to an unstructured environment  Medical management per SLIM.  Discharge Planning    Commitment Status: 201  ----------------------------------------      Subjective: Patient discussed in nursing report and overnight notes and charting reviewed. Per nursing report, patient is calm and cooperative.  He takes his medications as prescribed and denies medication side effects.  He denies any withdrawal symptoms of alcohol.  He started Effexor and will titrate the medication as tolerated     Behavior over the last 24 hours:  improved  Sleep: normal  Appetite: normal  Medication side effects: No  ROS: all other systems are negative    Mental Status Evaluation:  Appearance:  age appropriate, casually dressed   Behavior:  pleasant, cooperative   Speech:  normal rate and volume   Mood:  improved   Affect:  normal range and intensity   Thought Process:  coherent   Associations: intact associations   Thought Content:  no overt delusions   Perceptual Disturbances: no auditory hallucinations, no visual  hallucinations   Risk Potential: Suicidal ideation - None at present  Homicidal ideation - None at present  Potential for aggression - No   Sensorium:  oriented to person, place, and time/date   Memory:  recent and remote memory grossly intact   Consciousness:  alert and awake   Attention/Concentration: attention span and concentration are age appropriate   Insight:  partial   Judgment: partial   Gait/Station: normal gait/station   Motor Activity: no abnormal movements     Medications: all current active meds have been reviewed, continue current psychiatric medications, and current meds:   Current Facility-Administered Medications   Medication Dose Route Frequency    acetaminophen (TYLENOL) tablet 650 mg  650 mg Oral Q4H PRN    acetaminophen (TYLENOL) tablet 650 mg  650 mg Oral Q4H PRN    acetaminophen (TYLENOL) tablet 975 mg  975 mg Oral Q6H PRN    bacitracin topical ointment 1 small application  1 small application Topical BID    benztropine (COGENTIN) tablet 0.5 mg  0.5 mg Oral Q4H PRN Max 6/day    cephalexin (KEFLEX) capsule 500 mg  500 mg Oral Q8H DOROTA    cyanocobalamin injection 1,000 mcg  1,000 mcg Intramuscular Q30 Days    folic acid (FOLVITE) tablet 1 mg  1 mg Oral Daily    gabapentin (NEURONTIN) capsule 100 mg  100 mg Oral TID    hydrOXYzine HCL (ATARAX) tablet 25 mg  25 mg Oral Q6H PRN Max 4/day    hydrOXYzine HCL (ATARAX) tablet 50 mg  50 mg Oral Q6H PRN Max 4/day    LORazepam (ATIVAN) injection 1 mg  1 mg Intramuscular Q6H PRN Max 3/day    LORazepam (ATIVAN) tablet 1 mg  1 mg Oral Q6H PRN Max 3/day    multivitamin-minerals (CENTRUM) tablet 1 tablet  1 tablet Oral Daily    OLANZapine (ZyPREXA) IM injection 5 mg  5 mg Intramuscular Q3H PRN Max 3/day    OLANZapine (ZyPREXA) tablet 2.5 mg  2.5 mg Oral Q4H PRN Max 6/day    OLANZapine (ZyPREXA) tablet 5 mg  5 mg Oral Q4H PRN Max 3/day    OLANZapine (ZyPREXA) tablet 5 mg  5 mg Oral Q3H PRN Max 3/day    senna-docusate sodium (SENOKOT S) 8.6-50 mg per tablet 1  tablet  1 tablet Oral Daily PRN    thiamine tablet 100 mg  100 mg Oral Daily    traZODone (DESYREL) tablet 50 mg  50 mg Oral HS PRN    [START ON 8/15/2024] venlafaxine (EFFEXOR-XR) 24 hr capsule 75 mg  75 mg Oral Daily   .  Current Facility-Administered Medications   Medication Dose Route Frequency Provider Last Rate    acetaminophen  650 mg Oral Q4H PRN Kirt Dickkolus, CRNP      acetaminophen  650 mg Oral Q4H PRN Kirt Blacklus, CRROBYN      acetaminophen  975 mg Oral Q6H PRN Kirt Blacklus, RAVI      bacitracin  1 small application Topical BID Karen Nickerson PA-C      benztropine  0.5 mg Oral Q4H PRN Max 6/day Kirt Manley, RAVI      cephalexin  500 mg Oral Q8H DOROTA Tatum Fields PA-C      cyanocobalamin  1,000 mcg Intramuscular Q30 Days Karen Nickerson PA-C      folic acid  1 mg Oral Daily Tatum Fields PA-C      gabapentin  100 mg Oral TID Hernesto Kahn MD      hydrOXYzine HCL  25 mg Oral Q6H PRN Max 4/day Kirt Kokolus, RAVI      hydrOXYzine HCL  50 mg Oral Q6H PRN Max 4/day Kirt Dickkolus, RAVI      LORazepam  1 mg Intramuscular Q6H PRN Max 3/day Kirt Kokolus, CRNP      LORazepam  1 mg Oral Q6H PRN Max 3/day Kirt Kokolus, CRNP      multivitamin-minerals  1 tablet Oral Daily Tatum Fields PA-C      OLANZapine  5 mg Intramuscular Q3H PRN Max 3/day Kirt Kokolus, CRNP      OLANZapine  2.5 mg Oral Q4H PRN Max 6/day Kirt Kokolus, CRNP      OLANZapine  5 mg Oral Q4H PRN Max 3/day Kirt Dickkolus, CRNP      OLANZapine  5 mg Oral Q3H PRN Max 3/day Kirt Dickkolus, RAVI      senna-docusate sodium  1 tablet Oral Daily PRN Kirt Blacklus, RAVI      thiamine  100 mg Oral Daily Tatum Fields PA-C      traZODone  50 mg Oral HS PRN RAVI Jerome      [START ON 8/15/2024] venlafaxine  75 mg Oral Daily Matilde Abdi MD         Labs: I have personally reviewed all pertinent laboratory/tests results  Most Recent Labs:  "    Results from the past 24 hours: No results found for this or any previous visit (from the past 24 hour(s)).  Most Recent Labs:   Lab Results   Component Value Date    WBC 5.29 08/11/2024    RBC 3.98 08/11/2024    HGB 13.1 08/11/2024    HCT 39.4 08/11/2024     08/11/2024     08/11/2024    RDW 12.8 08/11/2024    NEUTROABS 2.60 08/11/2024    SODIUM 138 08/11/2024    K 3.8 08/11/2024     08/11/2024    CO2 28 08/11/2024    BUN 9 08/11/2024    CREATININE 0.84 08/11/2024    GLUC 108 08/11/2024    CALCIUM 8.8 08/11/2024    AST 14 08/11/2024    ALT 14 08/11/2024    ALKPHOS 47 08/11/2024    TP 6.2 (L) 08/11/2024    ALB 3.7 08/11/2024    TBILI 0.35 08/11/2024    CHOLESTEROL 157 08/11/2024    HDL 51 08/11/2024    TRIG 80 08/11/2024    LDLCALC 90 08/11/2024    NONHDLC 106 08/11/2024    [id number] 2.382 08/11/2024    HGBA1C 5.9 (H) 07/22/2021     07/22/2021     Last Laboratory Results with Depakote and/or Tegretol levels:   Lab Results   Component Value Date    WBC 5.29 08/11/2024    RBC 3.98 08/11/2024    HGB 13.1 08/11/2024    HCT 39.4 08/11/2024     08/11/2024     08/11/2024    RDW 12.8 08/11/2024    NEUTROABS 2.60 08/11/2024    SODIUM 138 08/11/2024    K 3.8 08/11/2024     08/11/2024    CO2 28 08/11/2024    BUN 9 08/11/2024    CREATININE 0.84 08/11/2024    GLUC 108 08/11/2024    CALCIUM 8.8 08/11/2024    AST 14 08/11/2024    ALT 14 08/11/2024    ALKPHOS 47 08/11/2024    TP 6.2 (L) 08/11/2024    ALB 3.7 08/11/2024    TBILI 0.35 08/11/2024     Last Laboratory Results with Lithium level:   Lab Results   Component Value Date    SODIUM 138 08/11/2024    K 3.8 08/11/2024     08/11/2024    CO2 28 08/11/2024    BUN 9 08/11/2024    CREATININE 0.84 08/11/2024    GLUC 108 08/11/2024    CALCIUM 8.8 08/11/2024     Labs in last 72 hours:   No results for input(s): \"WBC\", \"RBC\", \"HGB\", \"HCT\", \"PLT\", \"RDW\", \"TOTANEUTABS\", \"NEUTROABS\", \"SODIUM\", \"K\", \"CL\", \"CO2\", \"BUN\", \"CREATININE\", " "\"GLUC\", \"CALCIUM\", \"AST\", \"ALT\", \"ALKPHOS\", \"TP\", \"ALB\", \"TBILI\", \"CHOLESTEROL\", \"HDL\", \"TRIG\", \"LDLCALC\", \"VALPROICTOT\", \"CARBAMAZEPIN\", \"LITHIUM\", \"AMMONIA\", \"[id number]\", \"FREET4\", \"T3FREE\", \"PREGTESTUR\", \"PREGSERUM\", \"HCG\", \"HCGQUANT\", \"SYPHILISAB\" in the last 72 hours.    Admission Labs:   Admission on 08/10/2024   Component Date Value    Magnesium 08/09/2024 2.3     Sodium 08/11/2024 138     Potassium 08/11/2024 3.8     Chloride 08/11/2024 105     CO2 08/11/2024 28     ANION GAP 08/11/2024 5     BUN 08/11/2024 9     Creatinine 08/11/2024 0.84     Glucose 08/11/2024 108     Glucose, Fasting 08/11/2024 108 (H)     Calcium 08/11/2024 8.8     AST 08/11/2024 14     ALT 08/11/2024 14     Alkaline Phosphatase 08/11/2024 47     Total Protein 08/11/2024 6.2 (L)     Albumin 08/11/2024 3.7     Total Bilirubin 08/11/2024 0.35     eGFR 08/11/2024 97     WBC 08/11/2024 5.29     RBC 08/11/2024 3.98     Hemoglobin 08/11/2024 13.1     Hematocrit 08/11/2024 39.4     MCV 08/11/2024 99 (H)     MCH 08/11/2024 32.9     MCHC 08/11/2024 33.2     RDW 08/11/2024 12.8     MPV 08/11/2024 10.9     Platelets 08/11/2024 185     nRBC 08/11/2024 0     Segmented % 08/11/2024 49     Immature Grans % 08/11/2024 0     Lymphocytes % 08/11/2024 33     Monocytes % 08/11/2024 13 (H)     Eosinophils Relative 08/11/2024 4     Basophils Relative 08/11/2024 1     Absolute Neutrophils 08/11/2024 2.60     Absolute Immature Grans 08/11/2024 0.01     Absolute Lymphocytes 08/11/2024 1.73     Absolute Monocytes 08/11/2024 0.71     Eosinophils Absolute 08/11/2024 0.20     Basophils Absolute 08/11/2024 0.04     TSH 3RD GENERATON 08/11/2024 2.382     Cholesterol 08/11/2024 157     Triglycerides 08/11/2024 80     HDL, Direct 08/11/2024 51     LDL Calculated 08/11/2024 90     Non-HDL-Chol (CHOL-HDL) 08/11/2024 106     PTT 08/11/2024 33     Protime 08/11/2024 12.3     Thrombin Time Mixing Iliana* 08/11/2024      Thrombin [employer]* 08/11/2024      Thrombin Time " 08/11/2024 16.8     Platelets 08/11/2024 185     MPV 08/11/2024 10.9     Protime 08/11/2024 12.8     INR 08/11/2024 0.91     PTT 08/11/2024 34     Fibrinogen 08/11/2024 350     Vit D, 25-Hydroxy 08/11/2024 46.5     Vitamin B-12 08/11/2024 283        Progress Toward Goals: improving    Risks / Benefits of Treatment:    Risks, benefits, and possible side effects of medications explained to patient and patient verbalizes understanding and agreement for treatment.    Counseling / Coordination of Care:    Total floor / unit time spent today 15 minutes. Greater than 50% of total time was spent with the patient and / or family counseling and / or coordination of care.     A description of counseling / coordination of care:  Patient's progress discussed with staff in treatment team meeting.  Treatment plan, treatment progress and medication changes were reviewed with nursing staff, pharmacy service, and case management in interdisciplinary treatment team meeting.  Medications, treatment progress and treatment plan reviewed with patient.  Recent stressors including alcohol use problems discussed with patient.  Educated on importance of medication and treatment compliance.  Reassurance and supportive therapy provided.  Encouraged participation in milieu and group therapy on the unit.      Matilde Abdi MD 08/14/24

## 2024-08-14 NOTE — PROGRESS NOTES
08/14/24    Team Meeting   Meeting Type Daily Rounds   Team Members Present   Team Members Present Physician;;Nurse;;Occupational Therapist   Physician Team Member amauri garay   Nursing Team Member gaurav bennett   Social Work Team Member john baldwin   OT Team Member payal baldwin   Patient/Family Present   Patient Present No   Patient's Family Present No   Slept, denies all, AUD, referrals to rehab, pleasant, complaint

## 2024-08-14 NOTE — NURSING NOTE
Patient has been visible on the unit. He is pleasant and cooperative. Medication compliant. He slept well. Denied anxiety,depression,SI,HI, or hallucinations. He is social with staff and peers. Ambulates around the unit independently. No signs of withdrawal noted. Safety precautions maintained.

## 2024-08-14 NOTE — NURSING NOTE
Patient has been observed sleeping on the majority of the continual safety rounds. Patient shows no signs/symptoms of distress and has non-labored breathing. Continued care and continual safety rounds in progress.

## 2024-08-14 NOTE — PROGRESS NOTES
"Progress Note - Dayton Daley 54 y.o. male MRN: [id number]    Unit/Bed#: [facility] 203-02 Encounter: [id number]        Subjective:   Patient seen and examined at bedside after reviewing the chart and discussing the case with the caring staff.      Patient examined at bedside.  Patient has no acute symptoms.     Physical Exam   Vitals: Blood pressure 109/64, pulse 67, temperature 98.1 °F (36.7 °C), temperature source Temporal, resp. rate 18, height 5' 8\" (1.727 m), weight 78.3 kg (172 lb 9.6 oz), SpO2 99%.,Body mass index is 26.24 kg/m².  Constitutional: Patient in no acute distress.  HEENT: PERR, EOMI, MMM.  Cardiovascular: Normal rate and regular rhythm.    Pulmonary/Chest: Effort normal and breath sounds normal.   Abdomen: Soft, + BS, NT.    Assessment/Plan:  Daytno Daley is a(n) 54 y.o. male with MDD.     Hypertension.  Patient not currently taking medication.  Continue to monitor BP.  Alcohol use disorder.  Patient started on thiamine, folic acid, and multivitamin daily.   Laceration R shin with mild cellulitis.  3 sutures placed 8/3/24 per ED note.  Started on Kelflex 500 mg TID x 7 days (thru 8/17).  2 sutures removed 8/13 and 1 removed 8/14 without issues.  Apply bacitracin BID x7 days.  L ankle blister.  No signs of infection.  May apply Band-Aid daily.   Vitamin B12 deficiency.  Patient started vitamin B12 monthly injections.    The patient was discussed with Dr. Rubio and he is in agreement with the above note.  "

## 2024-08-15 PROCEDURE — 99232 SBSQ HOSP IP/OBS MODERATE 35: CPT

## 2024-08-15 RX ORDER — LANOLIN ALCOHOL/MO/W.PET/CERES
100 CREAM (GRAM) TOPICAL DAILY
Qty: 30 TABLET | Refills: 0 | Status: SHIPPED | OUTPATIENT
Start: 2024-08-15

## 2024-08-15 RX ORDER — FOLIC ACID 1 MG/1
1 TABLET ORAL DAILY
Qty: 30 TABLET | Refills: 0 | Status: SHIPPED | OUTPATIENT
Start: 2024-08-15

## 2024-08-15 RX ORDER — CEPHALEXIN 500 MG/1
500 CAPSULE ORAL EVERY 8 HOURS SCHEDULED
Qty: 7 CAPSULE | Refills: 0 | Status: SHIPPED | OUTPATIENT
Start: 2024-08-15 | End: 2024-08-17

## 2024-08-15 RX ORDER — UREA 10 %
500 LOTION (ML) TOPICAL DAILY
Qty: 30 TABLET | Refills: 0 | Status: SHIPPED | OUTPATIENT
Start: 2024-08-15

## 2024-08-15 RX ADMIN — GABAPENTIN 100 MG: 100 CAPSULE ORAL at 08:38

## 2024-08-15 RX ADMIN — FOLIC ACID 1 MG: 1 TABLET ORAL at 08:38

## 2024-08-15 RX ADMIN — CEPHALEXIN 500 MG: 250 CAPSULE ORAL at 15:38

## 2024-08-15 RX ADMIN — VENLAFAXINE HYDROCHLORIDE 75 MG: 75 CAPSULE, EXTENDED RELEASE ORAL at 08:38

## 2024-08-15 RX ADMIN — BACITRACIN ZINC 1 SMALL APPLICATION: 500 OINTMENT TOPICAL at 08:39

## 2024-08-15 RX ADMIN — GABAPENTIN 100 MG: 100 CAPSULE ORAL at 15:38

## 2024-08-15 RX ADMIN — BACITRACIN ZINC 1 SMALL APPLICATION: 500 OINTMENT TOPICAL at 17:20

## 2024-08-15 RX ADMIN — GABAPENTIN 100 MG: 100 CAPSULE ORAL at 22:20

## 2024-08-15 RX ADMIN — THIAMINE HCL TAB 100 MG 100 MG: 100 TAB at 08:38

## 2024-08-15 RX ADMIN — CEPHALEXIN 500 MG: 250 CAPSULE ORAL at 22:20

## 2024-08-15 RX ADMIN — CEPHALEXIN 500 MG: 250 CAPSULE ORAL at 05:42

## 2024-08-15 RX ADMIN — Medication 1 TABLET: at 08:38

## 2024-08-15 NOTE — SOCIAL WORK
Patient Demographics    Name Patient ID SSN Gender Identity Birth Date   Dayton Daley [id number]  Male [birth year] (54 yrs)     Address Phone Email     [address] [place] [facility] [address] [phone] (M)  [phone] (H) [email or web address]       [facility] Ethnicity      HARDING Not  or  or East Timorese        Reg Status PCP      Verified Yadiel Lu MD  [phone]        Marital Status Scientologist Alias     Legally  Anglican --       Emergency Contact 1 Emergency Contact 2   marie Daley (Father)  [phone] (M) amrita Thuy (Spouse)  [phone] (M)

## 2024-08-15 NOTE — SOCIAL WORK
Medications 08/15/24 08/16/24 08/17/24 08/18/24 08/19/24 08/20/24 08/21/24   bacitracin topical ointment 1 small application  Dose: 1 small application  Freq: 2 times daily Route: TP  Start: 08/14/24 1145 End: 08/19/24 1759   Admin Instructions:   For topical use ONLY   Order specific questions:       0839     1800      0900     1800      0900     1800      0900     1800      0900     1759-D/C'd        cephalexin (KEFLEX) capsule 500 mg  Dose: 500 mg  Freq: Every 8 hours scheduled Route: PO  Start: 08/10/24 2200 End: 08/17/24 2159   Admin Instructions:   Food-drug interaction teaching & documentation must be done; administer at least 3H before products containing zinc. LOOK/SOUND ALIKE MED   Order specific questions:       0542     1400     2200      0600     1400     2200      0600     1400     2159-D/C'd          cyanocobalamin injection 1,000 mcg  Dose: 1,000 mcg  Freq: Every 30 days Route: IM  Start: 08/13/24 0900             folic acid (FOLVITE) tablet 1 mg  Dose: 1 mg  Freq: Daily Route: PO  Start: 08/11/24 1315    0838      0900      0900      0900      0900      0900         gabapentin (NEURONTIN) capsule 100 mg  Dose: 100 mg  Freq: 3 times daily Route: PO  Indications of Use: ALCOHOL WITHDRAWAL SYNDROME  Start: 08/10/24 1715   Admin Instructions:   Hold for sedation  LOOK ALIKE SOUND ALIKE MED    0838     1600     2100      0900     1600     2100      0900     1600     2100      0900     1600     2100      0900     1600     2100          multivitamin-minerals (CENTRUM) tablet 1 tablet  Dose: 1 tablet  Freq: Daily Route: PO  Start: 08/11/24 1315   Admin Instructions:   **DISPOSE IN 8 GALLON BLACK CONTAINER**    0838      0900      0900      0900      0900      0900         thiamine tablet 100 mg  Dose: 100 mg  Freq: Daily Route: PO  Start: 08/11/24 1315    0838      0900      0900      0900      0900      0900         venlafaxine (EFFEXOR-XR) 24 hr capsule 75 mg  Dose: 75 mg  Freq: Daily Route: PO  Start:  08/15/24 0900   Admin Instructions:   Swallow whole or open and sprinkle on applesauce; do not crush or chew. LOOK ALIKE SOUND ALIKE MED    0838      0900      0900

## 2024-08-15 NOTE — NURSING NOTE
Patient has been visible on the unit. He is pleasant and cooperative. Medication compliant. He is social with staff and peers. He ambulates around the unit independently. Right lower leg scabs are healing well. He slept well. Denied anxiety,depression,SI,HI, or hallucinations. No signs of withdrawal noted. Safety precautions maintained.

## 2024-08-15 NOTE — SOCIAL WORK
Active Insurance as of 8/10/2024    Primary Coverage    Payor Plan Insurance Group Employer/Plan Group   [facility] [facility] [facility] [employer]     Payor Plan Address Payor Plan Phone Number Payor Plan Fax Number Effective Dates   [employer] [phone]  5/1/2024 - None Entered   [address]      [place] [address]      Subscriber Name Subscriber Birth Date Member ID    HEIDI HOFFVALENTE COSTELLO 1970 [id number]    Secondary Coverage    Payor Plan Insurance Group Employer/Plan Group   RUB[employer]N GRISSOM PENDING RUB[employer] PENDING     Payor Plan Address Payor Plan Phone Number Payor Plan Fax Number Effective Dates   BUSINESS OFFICE   8/10/2024 - 8/12/2024   [facility] [place] [address]      Subscriber Name Subscriber Birth Date Member ID    DAWNA HOFF 1970

## 2024-08-15 NOTE — SOCIAL WORK
CM sent referral to [facility] via fax [phone] and lvm for f/u [phone].

## 2024-08-15 NOTE — PLAN OF CARE
Problem: ANXIETY  Goal: Will report anxiety at manageable levels  Description: INTERVENTIONS:  - Administer medication as ordered  - Teach and encourage coping skills  - Provide emotional support  - Assess patient/family for anxiety and ability to cope  Outcome: Progressing  Goal: By discharge: Patient will verbalize 2 strategies to deal with anxiety  Description: Interventions:  - Identify any obvious source/trigger to anxiety  - Staff will assist patient in applying identified coping technique/skills  - Encourage attendance of scheduled groups and activities  Outcome: Progressing     Problem: SUBSTANCE USE/ABUSE  Goal: Will have no detox symptoms and will verbalize plan for changing substance-related behavior  Description: INTERVENTIONS:  - Monitor physical status and assess for symptoms of withdrawal  - Administer medication as ordered  - Provide emotional support with 1 on 1 interaction with staff  - Encourage recovery focused program/ addiction education  - Assess for verbalization of changing behaviors related to substance abuse  - Initiate consults and referrals as appropriate (Case Management, Spiritual Care, etc.)  Outcome: Progressing  Goal: By discharge, will develop insight into their chemical dependency and sustain motivation to continue in recovery  Description: INTERVENTIONS:  - Attends all daily group sessions and scheduled AA groups  - Actively practices coping skills through participation in the therapeutic community and adherence to program rules  - Reviews and completes assignments from individual treatment plan  - Assist patient development of understanding of their personal cycle of addiction and relapse triggers  Outcome: Progressing  Goal: By discharge, patient will have ongoing treatment plan addressing chemical dependency  Description: INTERVENTIONS:  - Assist patient with resources and/or appointments for ongoing recovery based living  Outcome: Progressing     Problem: Ineffective  Coping  Goal: Participates in unit activities  Description: Interventions:  - Provide therapeutic environment   - Provide required programming   - Redirect inappropriate behaviors   Outcome: Progressing  Goal: Identifies ineffective coping skills  Outcome: Progressing  Goal: Identifies healthy coping skills  Outcome: Progressing  Goal: Patient/Family participate in treatment and DC plans  Description: Interventions:  - Provide therapeutic environment  Outcome: Progressing  Goal: Patient/Family verbalizes awareness of resources  Outcome: Progressing     Problem: Risk for Self Injury/Neglect  Goal: Verbalize thoughts and feelings  Description: Interventions:  - Assess and re-assess patient's lethality and potential for self-injury  - Engage patient in 1:1 interactions, daily, for a minimum of 15 minutes  - Encourage patient to express feelings, fears, frustrations, hopes  - Establish rapport/trust with patient   Outcome: Progressing  Goal: Refrain from harming self  Description: Interventions:  - Monitor patient closely, per order  - Develop a trusting relationship  - Supervise medication ingestion, monitor effects and side effects   Outcome: Progressing     Problem: Depression  Goal: Refrain from isolation  Description: Interventions:  - Develop a trusting relationship   - Encourage socialization   Outcome: Progressing  Goal: Refrain from self-neglect  Outcome: Progressing  Goal: Complete daily ADLs, including personal hygiene independently, as able  Description: Interventions:  - Observe, teach, and assist patient with ADLS  -  Monitor and promote a balance of rest/activity, with adequate nutrition and elimination   Outcome: Progressing

## 2024-08-15 NOTE — PROGRESS NOTES
Progress Note - Behavioral Health   Dayton Daley 54 y.o. male MRN: [id number]  Unit/Bed#: [facility] 203-02 Encounter: [id number]    Assessment & Plan   Principal Problem:    Severe episode of recurrent major depressive disorder, without psychotic features (HCC)  Active Problems:    Alcohol use disorder, severe, dependence (HCC)    Anxiety and depression      Behavior over the last 24 hours:  improving  Sleep: normal  Appetite: normal  Medication side effects: No  ROS: no complaints and all other systems are negative    Subjective: Dayton was seen today for psychiatric follow-up. On assessment patient is calm, cooperative. He is visible on the unit, social with peers. Patient reports improving depression, his mood is controlled with no recent behavioral out bursts. He is compliant with his current medication regimen. He denied any SI/HI/AVH. He did not appear internally preoccupied.    Mental Status Evaluation:  Appearance:  age appropriate and casually dressed   Behavior:  Calm, pleasant   Speech:  normal pitch and normal volume   Mood:  Less depressed   Affect:  mood-congruent   Thought Process:  goal directed   Associations: intact associations   Thought Content:  No overt delusions   Perceptual Disturbances: Denied AVH, did not appear internally preoccupied   Risk Potential: Suicidal Ideations none at present  Homicidal Ideations none at present  Potential for Aggression No   Sensorium:  person, place, and time/date   Memory:  recent and remote memory grossly intact   Consciousness:  alert and awake    Attention: attention span and concentration were age appropriate   Insight:  fair   Judgment: fair   Gait/Station: normal gait/station   Motor Activity: no abnormal movements     Progress Toward Goals: Improving. Patient is calm, cooperative and pleasant he is compliant with his current psychotropic medication regimen. He denied side effects from current psychotropic medication regimen. Will continue current  psychotropic medication regimen. No discharge data at this time. Case management actively working on rehab referrals.    Recommended Treatment: Continue with group therapy, milieu therapy and occupational therapy.      Risks, benefits and possible side effects of Medications:   Risks, benefits, and possible side effects of medications explained to patient and patient verbalizes understanding.      Medications: all current active meds have been reviewed.    Labs: I have personally reviewed all pertinent laboratory/tests results. Most Recent Labs:   Lab Results   Component Value Date    WBC 5.29 08/11/2024    RBC 3.98 08/11/2024    HGB 13.1 08/11/2024    HCT 39.4 08/11/2024     08/11/2024     08/11/2024    RDW 12.8 08/11/2024    NEUTROABS 2.60 08/11/2024    SODIUM 138 08/11/2024    K 3.8 08/11/2024     08/11/2024    CO2 28 08/11/2024    BUN 9 08/11/2024    CREATININE 0.84 08/11/2024    GLUC 108 08/11/2024    GLUF 108 (H) 08/11/2024    CALCIUM 8.8 08/11/2024    AST 14 08/11/2024    ALT 14 08/11/2024    ALKPHOS 47 08/11/2024    TP 6.2 (L) 08/11/2024    ALB 3.7 08/11/2024    TBILI 0.35 08/11/2024    CHOLESTEROL 157 08/11/2024    HDL 51 08/11/2024    TRIG 80 08/11/2024    LDLCALC 90 08/11/2024    NONHDLC 106 08/11/2024    [id number] 2.382 08/11/2024    HGBA1C 5.9 (H) 07/22/2021     07/22/2021       Counseling / Coordination of Care  Total floor / unit time spent today 25 minutes.

## 2024-08-15 NOTE — SOCIAL WORK
Interview ban at 1pm secured with Ankit [phone] with ankit at [facility]

## 2024-08-15 NOTE — NURSING NOTE
Pt was visible on the unit, social w/ staff and peers. Pt denied all psych symptoms. Pt was pleasant, cooperative, and med compliant. Will CTM. Continuous pt safety checks ongoing.

## 2024-08-15 NOTE — NURSING NOTE
"Patient appears to be sleeping well thus far, no noted distress. Remains on 7\" checks for safety and behaviors.  "

## 2024-08-15 NOTE — PROGRESS NOTES
08/15/24    Team Meeting   Meeting Type Daily Rounds   Team Members Present   Team Members Present Physician;Nurse;Occupational Therapist;   Physician Team Member maynor garay   Nursing Team Member gaurav bowens rn    Social Work Team Member john baldwin   OT Team Member payal hayes   Patient/Family Present   Patient Present No   Patient's Family Present No    AUD, pleasant , referrals to rehabs  complaint, cooperative

## 2024-08-15 NOTE — PROGRESS NOTES
"Progress Note - Dayton Daley 54 y.o. male MRN: [id number]    Unit/Bed#: [facility] 203-02 Encounter: [id number]        Subjective:   Patient seen and examined at bedside after reviewing the chart and discussing the case with the caring staff.      Patient examined at bedside.  Patient has no acute symptoms.     Patient is a possible discharge today, Thursday 8/15/24.    Physical Exam   Vitals: Blood pressure 107/62, pulse 58, temperature 97.7 °F (36.5 °C), temperature source Temporal, resp. rate 18, height 5' 8\" (1.727 m), weight 78.3 kg (172 lb 9.6 oz), SpO2 99%.,Body mass index is 26.24 kg/m².  Constitutional: Patient in no acute distress.  HEENT: PERR, EOMI, MMM.  Cardiovascular: Normal rate and regular rhythm.    Pulmonary/Chest: Effort normal and breath sounds normal.   Abdomen: Soft, + BS, NT.    Assessment/Plan:  Dayton Daley is a(n) 54 y.o. male with MDD.    Medical clearance.  Patient is medically cleared for discharge.  All scripts were printed for the patient.     Hypertension.  Patient not currently taking medication.  Continue to monitor BP.  Alcohol use disorder.  Patient started on thiamine, folic acid, and multivitamin daily.   Laceration R shin with mild cellulitis.  3 sutures placed 8/3/24 per ED note.  Started on Kelflex 500 mg TID x 7 days (thru 8/17).  2 sutures removed 8/13 and 1 removed 8/14 without issues.  Apply bacitracin BID x7 days.  L ankle blister.  No signs of infection.  May apply Band-Aid daily.   Vitamin B12 deficiency.  Patient started vitamin B12 monthly injections.    The patient was discussed with Dr. Rubio and he is in agreement with the above note.  "

## 2024-08-16 PROBLEM — F33.2 SEVERE EPISODE OF RECURRENT MAJOR DEPRESSIVE DISORDER, WITHOUT PSYCHOTIC FEATURES (HCC): Status: RESOLVED | Noted: 2024-08-11 | Resolved: 2024-08-16

## 2024-08-16 PROBLEM — F32.A ANXIETY AND DEPRESSION: Status: RESOLVED | Noted: 2024-04-24 | Resolved: 2024-08-16

## 2024-08-16 PROBLEM — F41.9 ANXIETY AND DEPRESSION: Status: RESOLVED | Noted: 2024-04-24 | Resolved: 2024-08-16

## 2024-08-16 PROBLEM — F32.A DEPRESSION: Status: RESOLVED | Noted: 2024-08-16 | Resolved: 2024-08-16

## 2024-08-16 PROBLEM — F32.A DEPRESSION: Status: ACTIVE | Noted: 2024-08-16

## 2024-08-16 PROCEDURE — 99232 SBSQ HOSP IP/OBS MODERATE 35: CPT | Performed by: PSYCHIATRY & NEUROLOGY

## 2024-08-16 RX ORDER — VENLAFAXINE HYDROCHLORIDE 150 MG/1
150 CAPSULE, EXTENDED RELEASE ORAL DAILY
Qty: 30 CAPSULE | Refills: 1 | Status: SHIPPED | OUTPATIENT
Start: 2024-08-17 | End: 2024-08-18

## 2024-08-16 RX ORDER — DIVALPROEX SODIUM 500 MG/1
500 TABLET, EXTENDED RELEASE ORAL DAILY
Status: DISCONTINUED | OUTPATIENT
Start: 2024-08-16 | End: 2024-08-19 | Stop reason: HOSPADM

## 2024-08-16 RX ORDER — VENLAFAXINE HYDROCHLORIDE 150 MG/1
150 CAPSULE, EXTENDED RELEASE ORAL DAILY
Status: DISCONTINUED | OUTPATIENT
Start: 2024-08-17 | End: 2024-08-19 | Stop reason: HOSPADM

## 2024-08-16 RX ORDER — VENLAFAXINE HYDROCHLORIDE 75 MG/1
75 CAPSULE, EXTENDED RELEASE ORAL DAILY
Qty: 30 CAPSULE | Refills: 1 | Status: SHIPPED | OUTPATIENT
Start: 2024-08-17 | End: 2024-08-18

## 2024-08-16 RX ORDER — NALTREXONE HYDROCHLORIDE 50 MG/1
50 TABLET, FILM COATED ORAL DAILY
Status: DISCONTINUED | OUTPATIENT
Start: 2024-08-17 | End: 2024-08-19 | Stop reason: HOSPADM

## 2024-08-16 RX ORDER — GABAPENTIN 100 MG/1
100 CAPSULE ORAL 3 TIMES DAILY
Qty: 90 CAPSULE | Refills: 1 | Status: SHIPPED | OUTPATIENT
Start: 2024-08-16

## 2024-08-16 RX ADMIN — FOLIC ACID 1 MG: 1 TABLET ORAL at 08:53

## 2024-08-16 RX ADMIN — DIVALPROEX SODIUM 500 MG: 500 TABLET, FILM COATED, EXTENDED RELEASE ORAL at 14:01

## 2024-08-16 RX ADMIN — CEPHALEXIN 500 MG: 250 CAPSULE ORAL at 21:58

## 2024-08-16 RX ADMIN — GABAPENTIN 100 MG: 100 CAPSULE ORAL at 08:53

## 2024-08-16 RX ADMIN — CEPHALEXIN 500 MG: 250 CAPSULE ORAL at 06:24

## 2024-08-16 RX ADMIN — CEPHALEXIN 500 MG: 250 CAPSULE ORAL at 14:01

## 2024-08-16 RX ADMIN — THIAMINE HCL TAB 100 MG 100 MG: 100 TAB at 08:53

## 2024-08-16 RX ADMIN — BACITRACIN ZINC 1 SMALL APPLICATION: 500 OINTMENT TOPICAL at 17:08

## 2024-08-16 RX ADMIN — Medication 1 TABLET: at 08:53

## 2024-08-16 RX ADMIN — VENLAFAXINE HYDROCHLORIDE 75 MG: 75 CAPSULE, EXTENDED RELEASE ORAL at 08:53

## 2024-08-16 NOTE — SOCIAL WORK
Cm lvm for pt mom torito [phone] notifying of pt expected discharge    Cm sent referral to [facility] via fax [phone] through epic for op psych

## 2024-08-16 NOTE — PROGRESS NOTES
08/16/24   Team Meeting   Meeting Type Daily Rounds   Team Members Present   Team Members Present Physician;Occupational Therapist;Nurse;   Physician Team Member maynor garay   Nursing Team Member tegan bennett   Social Work Team Member john baldwin   OT Team Member nacho hayes   Patient/Family Present   Patient Present No   Patient's Family Present No   Interview with [facility], visible, med compliant, social, pleasant, denies all

## 2024-08-16 NOTE — PROGRESS NOTES
Progress Note - Behavioral Health   Dayton Daley 54 y.o. male MRN: [id number]  Unit/Bed#: [facility] 203-02 Encounter: [id number]    Assessment & Plan   Principal Problem:    Severe episode of recurrent major depressive disorder, without psychotic features (HCC)  Active Problems:    Alcohol use disorder, severe, dependence (HCC)    Anxiety and depression      Behavior over the last 24 hours:  unchanged  Sleep: normal  Appetite: normal  Medication side effects: No  ROS: no complaints and all other systems are negative    Subjective: Dayton was seen today for psychiatric follow-up.  Patient is calm, cooperative.  He is visible in the unit social with peers.  He denied any depression and anxiety.  Patient is goal-directed in his thought process, with good eye contact.  He is compliant with his current medication regimen.  He denied any sleep disturbance, SI/HI/AVH.  He did not appear internally preoccupied.    Mental Status Evaluation:  Appearance:  age appropriate and casually dressed   Behavior:  cooperative   Speech:  normal pitch and normal volume   Mood:  euthymic   Affect:  mood-congruent   Thought Process:  goal directed   Associations: intact associations   Thought Content:  No overt delusions   Perceptual Disturbances: Denied AVH, did not appear internally preoccupied   Risk Potential: Suicidal Ideations none at present  Homicidal Ideations none at present  Potential for Aggression No   Sensorium:  person, place, and time/date   Memory:  recent and remote memory grossly intact   Consciousness:  alert and awake    Attention: attention span and concentration were age appropriate   Insight:  fair   Judgment: fair   Gait/Station: normal gait/station   Motor Activity: no abnormal movements     Progress Toward Goals: unchanged.  Patient continues to exhibit a controlled mood on the unit with no recent aggression agitation toward staff or peers. He is compliant with his current psychotropic medication regimen. He denied  side effects from current psychotropic medication regimen. Will continue current psychotropic medication regimen. No discharge data at this time. Case management actively working on rehab referrals.    Recommended Treatment: Continue with group therapy, milieu therapy and occupational therapy.      Risks, benefits and possible side effects of Medications:   Risks, benefits, and possible side effects of medications explained to patient and patient verbalizes understanding.      Medications: all current active meds have been reviewed.    Labs: I have personally reviewed all pertinent laboratory/tests results. Most Recent Labs:   Lab Results   Component Value Date    WBC 5.29 08/11/2024    RBC 3.98 08/11/2024    HGB 13.1 08/11/2024    HCT 39.4 08/11/2024     08/11/2024     08/11/2024    RDW 12.8 08/11/2024    NEUTROABS 2.60 08/11/2024    SODIUM 138 08/11/2024    K 3.8 08/11/2024     08/11/2024    CO2 28 08/11/2024    BUN 9 08/11/2024    CREATININE 0.84 08/11/2024    GLUC 108 08/11/2024    GLUF 108 (H) 08/11/2024    CALCIUM 8.8 08/11/2024    AST 14 08/11/2024    ALT 14 08/11/2024    ALKPHOS 47 08/11/2024    TP 6.2 (L) 08/11/2024    ALB 3.7 08/11/2024    TBILI 0.35 08/11/2024    CHOLESTEROL 157 08/11/2024    HDL 51 08/11/2024    TRIG 80 08/11/2024    LDLCALC 90 08/11/2024    NONHDLC 106 08/11/2024    [id number] 2.382 08/11/2024    HGBA1C 5.9 (H) 07/22/2021     07/22/2021       Counseling / Coordination of Care  Total floor / unit time spent today 25 minutes.

## 2024-08-16 NOTE — SOCIAL WORK
Patient Demographics    Name Patient ID SSN Gender Identity Birth Date   Dayton Daley [id number]  Male [birth year] (54 yrs)     Address Phone Email     [address] [place] [facility] [address] [phone] (M)  [phone] (H) [email or web address]       [facility] Ethnicity      HARDING Not  or  or Mexican        Reg Status PCP      Verified Yadiel Lu MD  [phone]        Marital Status Presybeterian Alias     Legally  Hindu --       Emergency Contact 1 Emergency Contact 2   marie Daley (Father)  [phone] (M) amrita Thuy (Spouse)  [phone] (M)

## 2024-08-16 NOTE — SOCIAL WORK
Medications 08/16/24 08/17/24 08/18/24 08/19/24 08/20/24 08/21/24 08/22/24   bacitracin topical ointment 1 small application  Dose: 1 small application  Freq: 2 times daily Route: TP  Start: 08/14/24 1145 End: 08/19/24 1759   Admin Instructions:   For topical use ONLY   Order specific questions:       (0854)     1800      0900     1800      0900     1800      0900     1759-D/C'd         cephalexin (KEFLEX) capsule 500 mg  Dose: 500 mg  Freq: Every 8 hours scheduled Route: PO  Start: 08/10/24 2200 End: 08/17/24 2159   Admin Instructions:   Food-drug interaction teaching & documentation must be done; administer at least 3H before products containing zinc. LOOK/SOUND ALIKE MED   Order specific questions:       0624     1401     2200      0600     1400     2159-D/C'd           cyanocobalamin injection 1,000 mcg  Dose: 1,000 mcg  Freq: Every 30 days Route: IM  Start: 08/13/24 0900             divalproex sodium (DEPAKOTE ER) 24 hr tablet 500 mg  Dose: 500 mg  Freq: Daily Route: PO  Indications Comment: mood disorder  Start: 08/16/24 1400   Admin Instructions:   Swallow whole; do not crush, chew or split. Hazardous Agent, use appropriate precautions for handling and disposal.    1401      0900      0900      0900           folic acid (FOLVITE) tablet 1 mg  Dose: 1 mg  Freq: Daily Route: PO  Start: 08/11/24 1315    0853      0900      0900      0900      0900            Dose: 100 mg  Freq: 3 times daily Route: PO  Indications of Use: ALCOHOL WITHDRAWAL SYNDROME  Start: 08/10/24 1715 End: 08/16/24 1346   Admin Instructions:   Hold for sedation  LOOK ALIKE SOUND ALIKE MED    0853     1346-D/C'd            multivitamin-minerals (CENTRUM) tablet 1 tablet  Dose: 1 tablet  Freq: Daily Route: PO  Start: 08/11/24 1315   Admin Instructions:   **DISPOSE IN 8 GALLON BLACK CONTAINER**    0853      0900      0900      0900      0900          naltrexone (REVIA) tablet 50 mg  Dose: 50 mg  Freq: Daily Route: PO  Indications of  Use: ALCOHOL USE DISORDER  Start: 08/17/24 0900 0900 0900 0900 0900          thiamine tablet 100 mg  Dose: 100 mg  Freq: Daily Route: PO  Start: 08/11/24 1315    0853      0900 0900 0900 0900          venlafaxine (EFFEXOR-XR) 24 hr capsule 150 mg  Dose: 150 mg  Freq: Daily Route: PO  Indications of Use: GENERALIZED ANXIETY DISORDER,MAJOR DEPRESSIVE DISORDER  Start: 08/17/24 0900   Admin Instructions:   Swallow whole or open and sprinkle on applesauce; do not crush or chew. LOOK ALIKE SOUND ALIKE MED     0900 0900 0900 0900            Dose: 75 mg  Freq: Daily Route: PO  Start: 08/15/24 0900 End: 08/16/24 1244   Admin Instructions:   Swallow whole or open and sprinkle on applesauce; do not crush or chew. LOOK ALIKE SOUND ALIKE MED    [facility]     1244-D/C'd

## 2024-08-16 NOTE — SOCIAL WORK
Cm met with pt to discuss d/c options. Pt stated that he has historically been successful at the [facility] program. He has been to one in 2017/2019. Pt states he would like to work and take his lessons from recovery and incorporate them into his program.  Pt believes this works best for him. Pt is unsure why he continues to relapse and recognizes that he needs on-going op counseling in addition to a work program. Pt likes the [facility] bc it gives back to the community and is [place] based. Pt states he is going to start writing on his steps again. CM provided reassurance and encouragement.  CM to submit ref to [facility].

## 2024-08-16 NOTE — PROGRESS NOTES
"Progress Note - Dayton Daley 54 y.o. male MRN: [id number]    Unit/Bed#: [facility] 203-02 Encounter: [id number]        Subjective:   Patient seen and examined at bedside after reviewing the chart and discussing the case with the caring staff.      Patient examined at bedside.  Patient has no acute symptoms.     Patient is a possible discharge today, Friday 8/15/24.    Physical Exam   Vitals: Blood pressure 122/65, pulse 64, temperature 97.7 °F (36.5 °C), temperature source Temporal, resp. rate 16, height 5' 8\" (1.727 m), weight 78.3 kg (172 lb 9.6 oz), SpO2 99%.,Body mass index is 26.24 kg/m².  Constitutional: Patient in no acute distress.  HEENT: PERR, EOMI, MMM.  Cardiovascular: Normal rate and regular rhythm.    Pulmonary/Chest: Effort normal and breath sounds normal.   Abdomen: Soft, + BS, NT.    Assessment/Plan:  Dayton Daley is a(n) 54 y.o. male with MDD.    Medical clearance.  Patient is medically cleared for discharge.  All scripts were printed for the patient.     Hypertension.  Patient not currently taking medication.  Continue to monitor BP.  Alcohol use disorder.  Patient started on thiamine, folic acid, and multivitamin daily.   Laceration R shin with mild cellulitis.  3 sutures placed 8/3/24 per ED note.  Started on Kelflex 500 mg TID x 7 days (thru 8/17).  2 sutures removed 8/13 and 1 removed 8/14 without issues.  Apply bacitracin BID x7 days.  L ankle blister.  No signs of infection.  May apply Band-Aid daily.   Vitamin B12 deficiency.  Patient started vitamin B12 monthly injections.    The patient was discussed with Dr. Rubio and he is in agreement with the above note.  "

## 2024-08-16 NOTE — NURSING NOTE
Pt visible on unit and social with peers throughout day. Pt is calm, pleasant, and cooperative. Pt participates in group and is bright in affect. Pt denies current depression, anxiety, SI/HI/AVH. Pt states he believes the effexor is helping him. Continuous monitoring maintained.

## 2024-08-16 NOTE — DISCHARGE INSTR - OTHER ORDERS
You are being discharged to [address] [place] [address] RUBÉN Amanda [address] TELEPHONE [phone]     Triggers you have identified during your hospitalization that led to an admission of regression in a mental health include alcohol use disorder  . Coping skills you have identified during your hospitalization include reading and praying. If you are unable to deal with your distressed mood alone please contact Ankit at [facility] [phone]. If that is not effective and you continue to have (ex: suicidal ideation, homicidal ideation, distressed mood, overwhelmed, in crisis) please contact Crisis by dialing 636, call [facility] [phone] , dial [phone] or go to the nearest emergency center.      *[facility] Crisis Hotline: (447) 691-3787   *[place] Suicide Prevention Lifeline:  1-640.370.1052  *Alcohol Anonymous: 109.771.5105  *[facility] Drug & Alcohol Commission: 613) 574-2483   *National [place] on Mental [place]ness (TESS) HELPLINE: 149.802.3271/Website: www.tess.org  *Substance Abuse and Mental Health Services Administration([facility]) National Helpline, which is a confidential, free, 24-hour-a-day, 365-day-a-year, information service for individuals and family members facing mental health and/or substance use disorders. This service provides referrals to local treatment facilities, support groups, and community-based organizations. Callers can also order free publications and other information.  Call 1-871.493.9788/Website: www.[facility].gov  *[facility] 2-1-1: This is a toll free, confidential, 24-hour-a-day service which connects you to a community  in your area who can help you find services and resources that are available to you locally and provide critical services that can improve and save lives.  Call: 211  /Website: http://www.Clickpassorg/       Gretel, or Lenka, our Behavioral Health Nurse Navigators, will be calling you after your discharge, on the phone number that  you provided.  They will be available as an additional support, if needed.   If you wish to speak with one of them, you may contact Gretel at [phone] or Lenka at [phone].

## 2024-08-16 NOTE — PROGRESS NOTES
08/16/24 1205   Activity/Group Checklist   Group Admission/Discharge   Attendance Attended   Attendance Duration (min) 0-15   Interactions Interacted appropriately   Affect/Mood Appropriate   Goals Achieved Identified triggers;Identified feelings;Identified relapse prevention strategies;Discussed coping strategies;Discussed discharge plans;Identified resources and support systems;Able to listen to others;Able to engage in interactions;Able to reflect/comment on own behavior;Able to manage/cope with feelings;Verbalized increased hopefulness;Able to self-disclose;Able to experience relief/decrease in symptoms     Patient was willing to meet and complete relapse prevention plan with [facility].  Patient information from forms can be found in media.

## 2024-08-16 NOTE — NURSING NOTE
Patient is calm and cooperative, and brightens on approach. Denies SI/HI or AVH. Reports no anxiety or depression. Social and visible in the milieu. Patient makes his needs known, reported being excited about his meeting today. Continual Q7 min rounding and safety checks in place.

## 2024-08-16 NOTE — SOCIAL WORK
Interview with james at [facility] [phone]. Pt accepted. D/c anticipated on Mon.  Gabapentin not permitted at program.  Pt to discuss an alternative medication with provider and is willing to discontinue. Paper scripts to be provided. F/u for psych to be scheduled at [facility] by marin.

## 2024-08-16 NOTE — UTILIZATION REVIEW
Primary Coverage    Payor Plan Insurance Group Employer/Plan Group   [employer]     Payor Plan Address Payor Plan Phone Number Payor Plan Fax Number Effective Dates   [employer] [facility] [phone]  5/1/2024 - None Entered   [address]      [place] [address]      Subscriber Name Subscriber Birth Date Member ID    DAWNA HOFF PRAVIN 1970 [id number]    Secondary Coverage    Payor Plan Insurance Group Employer/Plan Group   [employer] PENDING RUB[employer]N [employer] PENDING     Payor Plan Address Payor Plan Phone Number Payor Plan Fax Number Effective Dates   BUSINESS OFFICE   8/10/2024 - 8/12/2024   [facility] [place]      [facility] [place] [address]        Subscriber Name Subscriber Birth Date Member ID    LUIS EDUARDODAWNA COSTELLO 1970

## 2024-08-17 PROCEDURE — 99232 SBSQ HOSP IP/OBS MODERATE 35: CPT

## 2024-08-17 RX ADMIN — THIAMINE HCL TAB 100 MG 100 MG: 100 TAB at 08:41

## 2024-08-17 RX ADMIN — BACITRACIN ZINC 1 SMALL APPLICATION: 500 OINTMENT TOPICAL at 08:41

## 2024-08-17 RX ADMIN — CEPHALEXIN 500 MG: 250 CAPSULE ORAL at 15:32

## 2024-08-17 RX ADMIN — VENLAFAXINE HYDROCHLORIDE 150 MG: 150 CAPSULE, EXTENDED RELEASE ORAL at 08:41

## 2024-08-17 RX ADMIN — CEPHALEXIN 500 MG: 250 CAPSULE ORAL at 06:18

## 2024-08-17 RX ADMIN — NALTREXONE HYDROCHLORIDE 50 MG: 50 TABLET, FILM COATED ORAL at 08:41

## 2024-08-17 RX ADMIN — BACITRACIN ZINC 1 SMALL APPLICATION: 500 OINTMENT TOPICAL at 17:31

## 2024-08-17 RX ADMIN — DIVALPROEX SODIUM 500 MG: 500 TABLET, FILM COATED, EXTENDED RELEASE ORAL at 08:41

## 2024-08-17 RX ADMIN — FOLIC ACID 1 MG: 1 TABLET ORAL at 08:41

## 2024-08-17 RX ADMIN — Medication 1 TABLET: at 08:41

## 2024-08-17 NOTE — NURSING NOTE
Patient visible on the unit. Pleasant and cooperative. Social with peers. Denies SI, HI, hallucinations, anxiety, and depression. Compliant with medications. Continuous rounding maintained.

## 2024-08-17 NOTE — PROGRESS NOTES
"Progress Note - Behavioral Health   Dayton Daley 54 y.o. male MRN: [id number]  Unit/Bed#: [facility] 203-02 Encounter: [id number]      Assessment & Plan   Active Problems:    Alcohol use disorder, severe, dependence (HCC)      Subjective:  Patient was seen today for continuation of care, records reviewed and patient was discussed with the morning case review team. Per staff,  Dayton is 201 admission, plan for discharge to rehab on Monday.  He remains pleasant and cooperative on the unit.     Dayton was seen today for psychiatric follow-up.  On assessment today, Dayton was seen resting in bed in his room.  Dayton was polite but scant during the interview.   He reports his mood is \"okay\" today and reports that he is sleeping well at night.  He is currently tolerating his medication.  He is denying any current anxiety or depression.  He continues to be agreeable to going to rehab on Monday.  Dayton denies acute suicidal/self-harm ideation/intent/plan upon direct inquiry at this time.  Dayton remains behaviorally appropriate, no agitation or aggression noted on exam or in report.  Dayton also denies HI/AH/VH, and does not appear overtly manic.  No overt delusions or paranoia are verbalized.  Dayton remains adherent to his current psychotropic medication regimen and denies any side effects from medications, as well as none noted on exam.    Recommended Treatment: Treatment plan and medication changes discussed and per the attending physician the plan is:    1.Continue with group therapy, milieu therapy and occupational therapy  2.Behavioral Health checks every 7 minutes  3.Continue frequent safety checks and vitals per unit protocol  4.Continue with SLIM medical management as indicated  5.Continue with current medication regimen: Depakote started last night by primary team 500mg PO at HS, level to be drawn Monday   6.Will review labs in the a.m.  7.Disposition Planning: Discharge planning and efforts remain " ongoing    Vitals:  Vitals:    08/17/24 0724   BP: 118/73   Pulse: 66   Resp: 18   Temp: (!) 97.3 °F (36.3 °C)   SpO2: 98%       Laboratory Results:  I have personally reviewed all pertinent laboratory/tests results.  Most Recent Labs:   Lab Results   Component Value Date    WBC 5.29 08/11/2024    RBC 3.98 08/11/2024    HGB 13.1 08/11/2024    HCT 39.4 08/11/2024     08/11/2024     08/11/2024    RDW 12.8 08/11/2024    NEUTROABS 2.60 08/11/2024    SODIUM 138 08/11/2024    K 3.8 08/11/2024     08/11/2024    CO2 28 08/11/2024    BUN 9 08/11/2024    CREATININE 0.84 08/11/2024    GLUC 108 08/11/2024    GLUF 108 (H) 08/11/2024    CALCIUM 8.8 08/11/2024    AST 14 08/11/2024    ALT 14 08/11/2024    ALKPHOS 47 08/11/2024    TP 6.2 (L) 08/11/2024    ALB 3.7 08/11/2024    TBILI 0.35 08/11/2024    CHOLESTEROL 157 08/11/2024    HDL 51 08/11/2024    TRIG 80 08/11/2024    LDLCALC 90 08/11/2024    NONHDLC 106 08/11/2024    [id number] 2.382 08/11/2024    HGBA1C 5.9 (H) 07/22/2021     07/22/2021       Psychiatric Review of Systems:  Behavior over the last 24 hours:  unchanged.   Sleep: adequate  Appetite: adequate  Medication side effects:  denies and none observed  ROS: no complaints, denies shortness of breath or chest pain and all other systems are negative for acute changes    Mental Status Evaluation:    Appearance:  adequate grooming, looks stated age   Behavior:  pleasant, calm   Speech:  scant, soft   Mood:  euthymic   Affect:  constricted   Thought Process:  goal directed, linear   Associations: intact associations   Thought Content:  no overt delusions   Perceptual Disturbances: none   Risk Potential: Suicidal ideation - None  Homicidal ideation - None  Potential for aggression - No   Sensorium:  oriented to person, place, time/date, and situation   Memory:  recent and remote memory grossly intact   Consciousness:  alert and awake   Attention/Concentration: attention span and concentration are  age appropriate   Insight:  improving   Judgment: improving   Gait/Station: in bed   Motor Activity: no abnormal movements     Progress Toward Goals:   Dayton is progressing towards goals of inpatient psychiatric treatment by continued medication compliance and is attending therapeutic modalities on the milieu. However, the patient continues to require inpatient psychiatric hospitalization for continued medication management and titration to optimize symptom reduction, improve sleep hygiene, and demonstrate adequate self-care.    Risk of Harm to Self:   Nursing Suicide Risk Assessment Last 24 hours: C-SSRS Risk (Since Last Contact)  Calculated C-SSRS Risk Score (Since Last Contact): No Risk Indicated  Current Specific Risk Factors include: diagnosis of mood disorder  Protective Factors: no current suicidal ideation, ability to communicate with staff on the unit, able to contract for safety on the unit, taking medications as ordered on the unit, improved depressive symptoms  Based on today's assessment, Dayton presents the following risk of harm to self: low    Risk of Harm to Others:  Nursing Homicide Risk Assessment: Violence Risk to Others: Denies within past 6 months  Current Specific Risk Factors include: none  Protective Factors: no current homicidal ideation  Based on today's assessment, Dayton presents the following risk of harm to others: low    The following interventions are recommended: behavioral checks every 7 minutes, continued hospitalization on locked unit        Behavioral Health Medications: all current active meds have been reviewed and continue current psychiatric medications.  Current Facility-Administered Medications   Medication Dose Route Frequency Provider Last Rate    acetaminophen  650 mg Oral Q4H PRN Kirt Kokolus, CRNP      acetaminophen  650 mg Oral Q4H PRN Kirt Kokolus, CRNP      acetaminophen  975 mg Oral Q6H PRN Kirt Dickkolus, CRNP      bacitracin  1 small application  Topical BID Karen Nickerson PA-C      benztropine  0.5 mg Oral Q4H PRN Max 6/day Kirt Kokolus, RAVI      cephalexin  500 mg Oral Q8H DOROTA Tatum Fields PA-C      cyanocobalamin  1,000 mcg Intramuscular Q30 Days Karen PARIS MIGUEL Nickerson      divalproex sodium  500 mg Oral Daily Valdo Mills, RAVI      folic acid  1 mg Oral Daily Tatum Fields PA-C      hydrOXYzine HCL  25 mg Oral Q6H PRN Max 4/day Kirt Kokolus, CRNP      hydrOXYzine HCL  50 mg Oral Q6H PRN Max 4/day Kirt Kokolus, RAVI      LORazepam  1 mg Intramuscular Q6H PRN Max 3/day Kirt Kokolus, CRNP      LORazepam  1 mg Oral Q6H PRN Max 3/day Kirt Kokolus, CRNP      multivitamin-minerals  1 tablet Oral Daily Tatum Fields PA-C      naltrexone  50 mg Oral Daily [facility], RAVI      OLANZapine  5 mg Intramuscular Q3H PRN Max 3/day Kirt Kokolus, CRNP      OLANZapine  2.5 mg Oral Q4H PRN Max 6/day Kirt Kokolus, CRNP      OLANZapine  5 mg Oral Q4H PRN Max 3/day Kirt Kokolus, CRNP      OLANZapine  5 mg Oral Q3H PRN Max 3/day Kirt Kokolus, CRNP      senna-docusate sodium  1 tablet Oral Daily PRN Kirt Kokolus, CRNP      thiamine  100 mg Oral Daily Tatum Fields PA-C      traZODone  50 mg Oral HS PRN Kirt Kokolus, CRNP      venlafaxine  150 mg Oral Daily [facility], CRNP         Risks / Benefits of Treatment:  Risks, benefits, and possible side effects of medications explained to patient and patient verbalizes understanding and agreement for treatment.    Counseling / Coordination of Care:  Patient's progress reviewed with nursing staff.  Medications, treatment progress and treatment plan reviewed with patient.  Supportive counseling provided to the patient.    Total floor/unit time spent today 25 minutes. Greater than 50% of total time was spent with the patient and / or family counseling and / or coordination of care. A description of the counseling /  coordination of care: medication education, treatment plan, supportive therapy.

## 2024-08-17 NOTE — PLAN OF CARE
Problem: ANXIETY  Goal: Will report anxiety at manageable levels  Description: INTERVENTIONS:  - Administer medication as ordered  - Teach and encourage coping skills  - Provide emotional support  - Assess patient/family for anxiety and ability to cope  Outcome: Progressing     Problem: Ineffective Coping  Goal: Participates in unit activities  Description: Interventions:  - Provide therapeutic environment   - Provide required programming   - Redirect inappropriate behaviors   Outcome: Progressing     Problem: Risk for Self Injury/Neglect  Goal: Verbalize thoughts and feelings  Description: Interventions:  - Assess and re-assess patient's lethality and potential for self-injury  - Engage patient in 1:1 interactions, daily, for a minimum of 15 minutes  - Encourage patient to express feelings, fears, frustrations, hopes  - Establish rapport/trust with patient   Outcome: Progressing  Goal: Refrain from harming self  Description: Interventions:  - Monitor patient closely, per order  - Develop a trusting relationship  - Supervise medication ingestion, monitor effects and side effects   Outcome: Progressing

## 2024-08-17 NOTE — NURSING NOTE
Pt up ad adilia & gait is steady. Pt is pleasant & socializes with other patients. Pt is cooperative & compliant with medications. Pt c/o mild depression & mild anxiety. Pt denies any hallucinations, suicidal or homicidal ideations. Q 7 min checks maintained to monitor pt's behavior & safety.

## 2024-08-17 NOTE — PROGRESS NOTES
"Progress Note - Dayton Daley 54 y.o. male MRN: [id number]    Unit/Bed#: -02 Encounter: [id number]        Subjective:   Patient seen and examined at bedside after reviewing the chart and discussing the case with the caring staff.      Patient examined at bedside.  Patient has no acute symptoms.     Patient is a possible discharge next week.    Physical Exam   Vitals: Blood pressure 118/73, pulse 66, temperature (!) 97.3 °F (36.3 °C), temperature source Temporal, resp. rate 18, height 5' 8\" (1.727 m), weight 78.3 kg (172 lb 9.6 oz), SpO2 98%.,Body mass index is 26.24 kg/m².  Constitutional: Patient in no acute distress.  HEENT: PERR, EOMI, MMM.  Cardiovascular: Normal rate and regular rhythm.    Pulmonary/Chest: Effort normal and breath sounds normal.   Abdomen: Soft, + BS, NT.    Assessment/Plan:  Dayton Daley is a(n) 54 y.o. male with MDD.    Medical clearance.  Patient is medically cleared for discharge.  All scripts were printed for the patient.     Hypertension.  Patient not currently taking medication.  Continue to monitor BP.  Alcohol use disorder.  Patient started on thiamine, folic acid, and multivitamin daily.   Laceration R shin with mild cellulitis.  3 sutures placed 8/3/24 per ED note.  Started on Kelflex 500 mg TID x 7 days (thru 8/17).  2 sutures removed 8/13 and 1 removed 8/14 without issues.  Apply bacitracin BID x7 days.  L ankle blister.  No signs of infection.  May apply Band-Aid daily.   Vitamin B12 deficiency.  Patient started vitamin B12 monthly injections.    The patient was discussed with Dr. Rubio and he is in agreement with the above note.  "

## 2024-08-18 PROCEDURE — 99232 SBSQ HOSP IP/OBS MODERATE 35: CPT

## 2024-08-18 RX ORDER — DIVALPROEX SODIUM 500 MG/1
500 TABLET, EXTENDED RELEASE ORAL DAILY
Qty: 30 TABLET | Refills: 1 | Status: SHIPPED | OUTPATIENT
Start: 2024-08-19

## 2024-08-18 RX ORDER — VENLAFAXINE HYDROCHLORIDE 150 MG/1
150 CAPSULE, EXTENDED RELEASE ORAL DAILY
Qty: 30 CAPSULE | Refills: 1 | Status: SHIPPED | OUTPATIENT
Start: 2024-08-18

## 2024-08-18 RX ORDER — NALTREXONE HYDROCHLORIDE 50 MG/1
50 TABLET, FILM COATED ORAL DAILY
Qty: 30 TABLET | Refills: 1 | Status: SHIPPED | OUTPATIENT
Start: 2024-08-19

## 2024-08-18 RX ADMIN — DIVALPROEX SODIUM 500 MG: 500 TABLET, FILM COATED, EXTENDED RELEASE ORAL at 08:40

## 2024-08-18 RX ADMIN — FOLIC ACID 1 MG: 1 TABLET ORAL at 08:40

## 2024-08-18 RX ADMIN — VENLAFAXINE HYDROCHLORIDE 150 MG: 150 CAPSULE, EXTENDED RELEASE ORAL at 08:40

## 2024-08-18 RX ADMIN — BACITRACIN ZINC 1 SMALL APPLICATION: 500 OINTMENT TOPICAL at 17:58

## 2024-08-18 RX ADMIN — THIAMINE HCL TAB 100 MG 100 MG: 100 TAB at 08:40

## 2024-08-18 RX ADMIN — Medication 1 TABLET: at 08:40

## 2024-08-18 RX ADMIN — BACITRACIN ZINC 1 SMALL APPLICATION: 500 OINTMENT TOPICAL at 08:40

## 2024-08-18 RX ADMIN — NALTREXONE HYDROCHLORIDE 50 MG: 50 TABLET, FILM COATED ORAL at 08:40

## 2024-08-18 NOTE — NURSING NOTE
Pt was visible on the unit this evening, social w/ peers. Pt denied all psych symptoms. Pt was pleasant, cooperative, and med compliant. Will CTM. Continuous pt safety checks ongoing.

## 2024-08-18 NOTE — NURSING NOTE
BLEPS assessment completed & see Head to Toe assessment. Lungs clear upon auscultation & no peripheral edema noted. Pt c/o mild depression & mild anxiety. Pt denies any hallucinations, suicidal or homicidal ideations. Q 7 min checks maintained to monitor pt's behavior & safety. Pt up ad adilia & gait is steady. Pt is pleasant & socializes with other patients. Pt is cooperative & compliant with medications.

## 2024-08-18 NOTE — DISCHARGE SUMMARY
"  Discharge Summary - Behavioral Health   Dayton Daley 54 y.o. male MRN: [id number]  Unit/Bed#: [facility] 203-02 Encounter: [id number]     Admission Date: 8/10/2024         Discharge Date: 8/19/2024    Attending Psychiatrist: Josef Cuello MD    Reason for Admission/HPI: Depression [F32.A]    According to H&P of Dr. Kahn    Patient is a 54 y.o. male presented with a past psychiatric history of alcohol use disorder-severe who presents voluntarily via a 201 for worsening depression and suicidal ideation secondary to alcohol abuse.     Symptoms prior to admission include: Increased depression, decreased motivation, anhedonia, alcohol abuse, decreased appetite and oral intake, feelings of guilt, hopelessness and worthlessness, and active and passive SI with plan.  Onset of symptoms was gradual starting 2 week ago with gradually worsening course since that time. Psychosocial Stressors: family, drug and alcohol, and social.     Per ED crisis note by Ayesha Lino:     \"Pt is intoxicated and complaining of pain in the left lower leg from getting stitches a couple days ago. The area looks red and swollen. Pt doesn't have any shoes at this time and is very dirty and disheveled. CW was able to meet with pt. PT stated that he flew up from [place] for a reunion for [facility]. PT stated that he comes back and fourth living with his father and step mother. Pt reports that PT stated taht he has mulpitle Rehab admissions the last one bring [facility] in May. PT stated that he has been to [facility] for 45 days. Pt stated that he does go to  regularly. PT stated that he is on probation for a DUI. Dayton reports that he might have pending as he broke a window at his step mothers home and trespassing. He reported that when his step mother found out about his drinking she kicked him out. Dayton explained that he has been stuggling with substance abuse for about 40 years. PT stated that he drinks daily. PT stated " "when he was drinking he was haivng SI with a plan to make it look like an accident. PT reports that his is the first time he has done that. PT stated that he has no prior attempts in the past. Pt stated that he has been having bad thoughts. PT reports that he has been making poor decisions due to his drinking. PT reports that he has had a problem with crack/cocaine. PT denied using at this point. PT reports that in general he sleep and appetite is okay. PT denied HI/AH/VH at this time. PT stated that he has no current SI thoughts but reports that he would like mental health treatment at this time. PT and  signed 201.201 explained to pt. \"     Dayton states that he began experiencing increased depressive symptoms as well as active and passive suicidal ideation with plan over the past 2 weeks since relapsing on alcohol.  Patient states that he has been drinking regularly for the past 40 years, with intermittent periods of sobriety primarily in the context of being in a substance abuse program or on active outpatient treatment for alcohol addiction.  Patient states that he recently completed a program with [employer] earlier this year and has been sober for approximately 55 days while in the program.  Patient states that he went to [place] for a while and intended to come back up to [place] for a reunion at [facility] to celebrate sobriety of him and the other participants.       Patient states that he gradually started drinking again, particularly beer, while in [place] and picked up his uses while at the airport on his way back to [place].  Patient states that he typically drinks vodka and while at the airport obtained a liter of vodka and began drinking at.  Patient states that he started drinking again for a week straight leading to an injury in which he suffered a leg wound and had to go to the ED to get sutures around August 3rd of this year.  Patient states that his leg " "subsequently became infected and he has been on antibiotics since.  Patient states that since the injury he has continued to drink over the past week leading up to this admission.       Patient states that over this past week he has experienced increased depressive symptoms including, intense feelings of guilt and hopelessness, believing that his life would not get any better and that he would never be able to overcome his alcohol addiction.  Patient states that he is currently homeless and that his family will not talk to home when he is drinking.  Patient states that this isolation also increase his depression.  Patient states that due to these events he experienced intense passive, followed by active suicidal ideation, wanting to escape his life and no longer deal with the addiction.     Regarding depressive symptoms, patient reports depressed mood, anhedonia, decreased motivation, decreased appetite, decreased sleep, decreased energy, feelings of guilt, hopelessness and worthlessness as well as active and passive suicidal ideation.  Patient states that he sees experience current episodes of depression throughout his life but only in the context of alcohol use.  Patient states that during the periods of time when he is sober his mood is fairly stable and he is not experienced any depressive episodes during those times.  Patient states that he has been on antidepressants before, including Zoloft but states that they were ineffective.  Patient states that he was recently placed on Trileptal for \"ADHD\" approximately a year ago while at [facility].       Patient denies any history of manic episodes including any periods of elevated/euphoric/irritable mood accompanied by significant sleep deficit, increased energy, increasing goal-directed activities, indiscrete/risky behavior, pressured speech, distractibility or grandiosity.     Based on patient's described symptoms, it appears his most likely diagnosis is " alcohol induced depressive disorder, particularly given the lack of depressive symptoms during periods of sobriety.  Patient states that he is interested in going into an inpatient drug and alcohol program following psychiatric admission.  Patient also states that he would like to utilize outpatient resources for addiction as well.  Patient states that he was previously on acamprosate and disulfiram for alcohol abuse but but simply stopped taking the pills in order to drink.  Patient states he was also on Vivitrol for period of time and was able to abstain for a while but resumed drinking in between shots when the medication wore off.       We discussed patient working with for an outpatient addiction provider to possibly resume Vivitrol shots, possibly with an adjusted regimen and close monitoring, to maintain sobriety.  I discussed medication recommendations with this patient.  Due to patient's history of Trileptal, possibly for mood stabilization and lack of records from [place] as well as patient's depressive symptoms likely being due to alcoholism, initiation of an SSRI-type depressant will be deferred at this time.  However, gabapentin may be continued as part of [facility] protocol with careful taper until discontinuation.  Patient was amenable to plan.    Hospital Course: The patient was admitted to the inpatient psychiatric unit and started on every 7 minutes precautions. During the hospitalization the patient was attending individual therapy, group therapy, milieu therapy and occupational therapy.    Psychiatric medications were titrated over the hospital stay. To address depressive symptoms, mood instability, and potential alcohol withdrawal symptoms the patient was started on antidepressant Effexor XR, mood stabilizer Depakote ER, and medications to control alcohol withdrawal Naltrexone. Medication doses were titrated during the hospital course. Prior to beginning of treatment medications risks and benefits  and possible side effects including risk of liver impairment related to treatment with Depakote and risk of suicidality and serotonin syndrome related to treatment with antidepressants were reviewed with the patient. The patient verbalized understanding and agreement for treatment.     Patient's symptoms improved gradually over the hospital course. At the end of treatment the patient was doing well. Mood was stable at the time of discharge. The patient denied suicidal ideation, intent or plan at the time of discharge and denied homicidal ideation, intent or plan at the time of discharge. There was no overt psychosis at the time of discharge. Sleep and appetite were improved. The patient was tolerating medications and was not reporting any significant side effects at the time of discharge.    Since the patient was doing well at the end of the hospitalization, treatment team felt that the patient could be safely discharged to outpatient care.     The outpatient follow up with  Tre[facility] Mclaughlin, and [facility] Rosario for psych  was arranged by the unit  upon discharge.    Mental Status at time of Discharge:     Appearance:  age appropriate and casually dressed   Behavior:  Calm, cooperative   Speech:  normal pitch and normal volume   Mood:  euthymic   Affect:  mood-congruent   Thought Process:  goal directed   Thought Content:  No overt delusions   Perceptual Disturbances: Denied AVH, did not appear internally preoccupied   Risk Potential: None   Sensorium:  person, place, and time/date   Cognition:  recent and remote memory grossly intact   Consciousness:  alert and awake    Attention: attention span and concentration were age appropriate   Insight:  good   Judgment: good   Gait/Station: normal gait/station   Motor Activity: no abnormal movements     Admission Diagnosis:Depression [F32.A]    Discharge Diagnosis:   Principal Problem (Resolved):    Severe episode of recurrent major depressive  disorder, without psychotic features (HCC)  Active Problems:    Alcohol use disorder, severe, dependence (HCC)  Resolved Problems:    Anxiety and depression    Depression        Lab results:  Admission on 08/10/2024   Component Date Value    Magnesium 08/09/2024 2.3     Sodium 08/11/2024 138     Potassium 08/11/2024 3.8     Chloride 08/11/2024 105     CO2 08/11/2024 28     ANION GAP 08/11/2024 5     BUN 08/11/2024 9     Creatinine 08/11/2024 0.84     Glucose 08/11/2024 108     Glucose, Fasting 08/11/2024 108 (H)     Calcium 08/11/2024 8.8     AST 08/11/2024 14     ALT 08/11/2024 14     Alkaline Phosphatase 08/11/2024 47     Total Protein 08/11/2024 6.2 (L)     Albumin 08/11/2024 3.7     Total Bilirubin 08/11/2024 0.35     eGFR 08/11/2024 97     WBC 08/11/2024 5.29     RBC 08/11/2024 3.98     Hemoglobin 08/11/2024 13.1     Hematocrit 08/11/2024 39.4     MCV 08/11/2024 99 (H)     MCH 08/11/2024 32.9     MCHC 08/11/2024 33.2     RDW 08/11/2024 12.8     MPV 08/11/2024 10.9     Platelets 08/11/2024 185     nRBC 08/11/2024 0     Segmented % 08/11/2024 49     Immature Grans % 08/11/2024 0     Lymphocytes % 08/11/2024 33     Monocytes % 08/11/2024 13 (H)     Eosinophils Relative 08/11/2024 4     Basophils Relative 08/11/2024 1     Absolute Neutrophils 08/11/2024 2.60     Absolute Immature Grans 08/11/2024 0.01     Absolute Lymphocytes 08/11/2024 1.73     Absolute Monocytes 08/11/2024 0.71     Eosinophils Absolute 08/11/2024 0.20     Basophils Absolute 08/11/2024 0.04     TSH 3RD GENERATON 08/11/2024 2.382     Cholesterol 08/11/2024 157     Triglycerides 08/11/2024 80     HDL, Direct 08/11/2024 51     LDL Calculated 08/11/2024 90     Non-HDL-Chol (CHOL-HDL) 08/11/2024 106     PTT 08/11/2024 33     Protime 08/11/2024 12.3     Thrombin Time Mixing Iliana* 08/11/2024      Thrombin Time Mixing Inc* 08/11/2024      Thrombin Time 08/11/2024 16.8     Platelets 08/11/2024 185     MPV 08/11/2024 10.9     Protime 08/11/2024 12.8     INR  08/11/2024 0.91     PTT 08/11/2024 34     Fibrinogen 08/11/2024 350     Vit D, 25-Hydroxy 08/11/2024 46.5     Vitamin B-12 08/11/2024 283        Discharge Medications:  Current Discharge Medication List        START taking these medications    Details   divalproex sodium (DEPAKOTE ER) 500 mg 24 hr tablet Take 1 tablet (500 mg total) by mouth daily  Qty: 30 tablet, Refills: 1    Associated Diagnoses: Severe episode of recurrent major depressive disorder, without psychotic features (HCC)                 naltrexone (REVIA) 50 mg tablet Take 1 tablet (50 mg total) by mouth daily  Qty: 30 tablet, Refills: 1    Associated Diagnoses: Alcohol use disorder, severe, dependence (HCC)      venlafaxine (EFFEXOR-XR) 150 mg 24 hr capsule Take 1 capsule (150 mg total) by mouth daily  Qty: 30 capsule, Refills: 1    Associated Diagnoses: Severe episode of recurrent major depressive disorder, without psychotic features (HCC)      vitamin B-12 (VITAMIN B-12) 500 mcg tablet Take 1 tablet (500 mcg total) by mouth daily  Qty: 30 tablet, Refills: 0    Associated Diagnoses: Vitamin B12 deficiency              Current Discharge Medication List        STOP taking these medications       amoxicillin-clavulanate (AUGMENTIN) 875-125 mg per tablet Comments:   Reason for Stopping:         OXcarbazepine (TRILEPTAL) 600 mg tablet Comments:   Reason for Stopping:         cephalexin (KEFLEX) 500 mg capsule Comments:   Reason for Stopping:                Current Discharge Medication List        CONTINUE these medications which have CHANGED    Details   folic acid (FOLVITE) 1 mg tablet Take 1 tablet (1 mg total) by mouth daily  Qty: 30 tablet, Refills: 0    Associated Diagnoses: Alcohol use disorder, severe, dependence (HCC)      thiamine 100 MG tablet Take 1 tablet (100 mg total) by mouth daily  Qty: 30 tablet, Refills: 0    Associated Diagnoses: Alcohol use disorder, severe, dependence (HCC)              Current Discharge Medication List         CONTINUE these medications which have NOT CHANGED    Details   Multiple Vitamin (multivitamin) tablet Take 1 tablet by mouth daily              Discharge instructions/Information to patient and family:   See after visit summary for information provided to patient and family.      Provisions for Follow-Up Care:  See after visit summary for information related to follow-up care and any pertinent home health orders.      Discharge Statement     I spent 30 minutes discharging the patient. This time was spent on the day of discharge. I had direct contact with the patient on the day of discharge.     Additional documentation is required if more than 30 minutes were spent on discharge:    I reviewed with Dayton importance of compliance with medications and outpatient treatment after discharge.  I discussed the medication regimen and possible side effects of the medications with Dayton prior to discharge. At the time of discharge he was tolerating psychiatric medications.  I discussed outpatient follow up with Dayton.  I reviewed with Dayton crisis plan and safety plan upon discharge.

## 2024-08-18 NOTE — PROGRESS NOTES
"Progress Note - Behavioral Health   Dayton Daley 54 y.o. male MRN: [id number]  Unit/Bed#: [facility] 203-02 Encounter: [id number]      Assessment & Plan   Active Problems:    Alcohol use disorder, severe, dependence (HCC)      Subjective:  Patient was seen today for continuation of care, records reviewed and patient was discussed with the morning case review team. Per staff,  Dayton has been visible and social with peers on the unit.  He has been pleasant and cooperative with care.      Dayton was seen today for psychiatric follow-up.  On assessment today, Dayton was seen in the group room away form peers today.  He presents as euthymic today and reports he is looking for to the \"next phase\" meaning rehab.  He admits his drinking affects his mood greatly.  His mood has improved since admission.  He feels ready for discharge to rehab tomorrow.  He reports that he slept well and has a good appetite.   Dayton denies acute suicidal/self-harm ideation/intent/plan upon direct inquiry at this time.   Dayton also denies HI/AH/VH, and does not appear overtly manic.  No overt delusions or paranoia are verbalized.  Dayton remains adherent to his current psychotropic medication regimen and denies any side effects from medications, as well as none noted on exam.    Recommended Treatment: Treatment plan and medication changes discussed and per the attending physician the plan is:    1.Continue with group therapy, milieu therapy and occupational therapy  2.Behavioral Health checks every 7 minutes  3.Continue frequent safety checks and vitals per unit protocol  4.Continue with [facility] medical management as indicated  5.Continue with current medication regimen  6.Will review labs in the a.m.  7.Disposition Planning: Discharge planning and efforts remain ongoing    Vitals:  Vitals:    08/18/24 0703   BP: 123/70   Pulse: 71   Resp: 15   Temp: 97.8 °F (36.6 °C)   SpO2: 96%       Laboratory Results:  I have personally reviewed all pertinent " laboratory/tests results.  Most Recent Labs:   Lab Results   Component Value Date    WBC 5.29 08/11/2024    RBC 3.98 08/11/2024    HGB 13.1 08/11/2024    HCT 39.4 08/11/2024     08/11/2024     08/11/2024    RDW 12.8 08/11/2024    NEUTROABS 2.60 08/11/2024    SODIUM 138 08/11/2024    K 3.8 08/11/2024     08/11/2024    CO2 28 08/11/2024    BUN 9 08/11/2024    CREATININE 0.84 08/11/2024    GLUC 108 08/11/2024    GLUF 108 (H) 08/11/2024    CALCIUM 8.8 08/11/2024    AST 14 08/11/2024    ALT 14 08/11/2024    ALKPHOS 47 08/11/2024    TP 6.2 (L) 08/11/2024    ALB 3.7 08/11/2024    TBILI 0.35 08/11/2024    CHOLESTEROL 157 08/11/2024    HDL 51 08/11/2024    TRIG 80 08/11/2024    LDLCALC 90 08/11/2024    NONHDLC 106 08/11/2024    [id number] 2.382 08/11/2024    HGBA1C 5.9 (H) 07/22/2021     07/22/2021       Psychiatric Review of Systems:  Behavior over the last 24 hours:  unchanged.   Sleep: adequate  Appetite: adequate  Medication side effects:  denies and none observed  ROS: no complaints, denies shortness of breath or chest pain and all other systems are negative for acute changes    Mental Status Evaluation:    Appearance:  adequate grooming, wearing hospital clothes, looks stated age   Behavior:  pleasant, cooperative, calm   Speech:  normal rate and volume, fluent, clear   Mood:  euthymic   Affect:  appropriate   Thought Process:  goal directed, linear   Associations: intact associations   Thought Content:  no overt delusions   Perceptual Disturbances: none   Risk Potential: Suicidal ideation - None  Homicidal ideation - None  Potential for aggression - No   Sensorium:  oriented to person, place, time/date, and situation   Memory:  recent and remote memory grossly intact   Consciousness:  alert and awake   Attention/Concentration: attention span and concentration are age appropriate   Insight:  improving   Judgment: improving   Gait/Station: in bed   Motor Activity: no abnormal movements      Progress Toward Goals:   Dayton is progressing towards goals of inpatient psychiatric treatment by continued medication compliance and is attending therapeutic modalities on the milieu. However, the patient continues to require inpatient psychiatric hospitalization for continued medication management and titration to optimize symptom reduction, improve sleep hygiene, and demonstrate adequate self-care.    Risk of Harm to Self:   Nursing Suicide Risk Assessment Last 24 hours: C-SSRS Risk (Since Last Contact)  Calculated C-SSRS Risk Score (Since Last Contact): No Risk Indicated  Current Specific Risk Factors include: diagnosis of mood disorder  Protective Factors: no current suicidal ideation, ability to communicate with staff on the unit, able to contract for safety on the unit, taking medications as ordered on the unit, improved depressive symptoms  Based on today's assessment, Dayton presents the following risk of harm to self: low    Risk of Harm to Others:  Nursing Homicide Risk Assessment: Violence Risk to Others: Denies within past 6 months  Current Specific Risk Factors include: none  Protective Factors: no current homicidal ideation  Based on today's assessment, Dayton presents the following risk of harm to others: low    The following interventions are recommended: behavioral checks every 7 minutes, continued hospitalization on locked unit        Behavioral Health Medications: all current active meds have been reviewed and continue current psychiatric medications.  Current Facility-Administered Medications   Medication Dose Route Frequency Provider Last Rate    acetaminophen  650 mg Oral Q4H PRN RAVI Jerome      acetaminophen  650 mg Oral Q4H PRN RAVI Jerome      acetaminophen  975 mg Oral Q6H PRN RAVI Jerome      bacitracin  1 small application Topical BID Karen Nickerson PA-C      benztropine  0.5 mg Oral Q4H PRN Max 6/day RAVI Jerome       cyanocobalamin  1,000 mcg Intramuscular Q30 Days Karen Nickerson PA-C      divalproex sodium  500 mg Oral Daily Valdo Mills, RAVI      folic acid  1 mg Oral Daily Tatum Fields PA-C      hydrOXYzine HCL  25 mg Oral Q6H PRN Max 4/day Kirt Kokolus, CRNP      hydrOXYzine HCL  50 mg Oral Q6H PRN Max 4/day Kirt Kokolus, CRNP      LORazepam  1 mg Intramuscular Q6H PRN Max 3/day Kirt Kokolus, CRNP      LORazepam  1 mg Oral Q6H PRN Max 3/day Kirt Kokolus, CRNP      multivitamin-minerals  1 tablet Oral Daily Tatum Fields PA-C      naltrexone  50 mg Oral Daily Valdo Mills, RAVI      OLANZapine  5 mg Intramuscular Q3H PRN Max 3/day Kirt Kokolus, CRNP      OLANZapine  2.5 mg Oral Q4H PRN Max 6/day Kirt Kokolus, CRNP      OLANZapine  5 mg Oral Q4H PRN Max 3/day Kirt Kokolus, CRNP      OLANZapine  5 mg Oral Q3H PRN Max 3/day Kirt Kokolus, CRNP      senna-docusate sodium  1 tablet Oral Daily PRN Kirt Kokolus, CRROBYN      thiamine  100 mg Oral Daily Tatum Fields PA-C      traZODone  50 mg Oral HS PRN Kirt Kokolus, CRNP      venlafaxine  150 mg Oral Daily Valdo Garcia, CRNP         Risks / Benefits of Treatment:  Risks, benefits, and possible side effects of medications explained to patient and patient verbalizes understanding and agreement for treatment.    Counseling / Coordination of Care:  Patient's progress reviewed with nursing staff.  Medications, treatment progress and treatment plan reviewed with patient.  Supportive counseling provided to the patient.    Total floor/unit time spent today 25 minutes. Greater than 50% of total time was spent with the patient and / or family counseling and / or coordination of care. A description of the counseling / coordination of care: medication education, treatment plan, supportive therapy.

## 2024-08-19 VITALS
RESPIRATION RATE: 18 BRPM | HEIGHT: 68 IN | BODY MASS INDEX: 26.16 KG/M2 | SYSTOLIC BLOOD PRESSURE: 118 MMHG | HEART RATE: 79 BPM | TEMPERATURE: 97.9 F | OXYGEN SATURATION: 98 % | WEIGHT: 172.6 LBS | DIASTOLIC BLOOD PRESSURE: 69 MMHG

## 2024-08-19 LAB — VALPROATE SERPL-MCNC: 27 UG/ML (ref 50–100)

## 2024-08-19 PROCEDURE — 80164 ASSAY DIPROPYLACETIC ACD TOT: CPT

## 2024-08-19 PROCEDURE — 99238 HOSP IP/OBS DSCHRG MGMT 30/<: CPT

## 2024-08-19 RX ADMIN — DIVALPROEX SODIUM 500 MG: 500 TABLET, FILM COATED, EXTENDED RELEASE ORAL at 08:47

## 2024-08-19 RX ADMIN — FOLIC ACID 1 MG: 1 TABLET ORAL at 08:47

## 2024-08-19 RX ADMIN — Medication 1 TABLET: at 08:47

## 2024-08-19 RX ADMIN — VENLAFAXINE HYDROCHLORIDE 150 MG: 150 CAPSULE, EXTENDED RELEASE ORAL at 08:47

## 2024-08-19 RX ADMIN — NALTREXONE HYDROCHLORIDE 50 MG: 50 TABLET, FILM COATED ORAL at 08:47

## 2024-08-19 RX ADMIN — THIAMINE HCL TAB 100 MG 100 MG: 100 TAB at 08:47

## 2024-08-19 RX ADMIN — BACITRACIN ZINC 1 SMALL APPLICATION: 500 OINTMENT TOPICAL at 08:48

## 2024-08-19 NOTE — NURSING NOTE
Patient ambulated form unit accompanied by staff. AVS reviewed w/ pt and he verbalizes understanding. Paper scripts sent w/ pt. All belongings sent w/ pt.

## 2024-08-19 NOTE — PROGRESS NOTES
"Progress Note - Dayton Daley 54 y.o. male MRN: [id number]    Unit/Bed#: -02 Encounter: [id number]        Subjective:   Patient seen and examined at bedside after reviewing the chart and discussing the case with the caring staff.      Patient examined at bedside.  Patient has no acute symptoms.     Patient is being discharged today, Monday 8/19/24.    Physical Exam   Vitals: Blood pressure 118/69, pulse 79, temperature 97.9 °F (36.6 °C), temperature source Temporal, resp. rate 18, height 5' 8\" (1.727 m), weight 78.3 kg (172 lb 9.6 oz), SpO2 98%.,Body mass index is 26.24 kg/m².  Constitutional: Patient in no acute distress.  HEENT: PERR, EOMI, MMM.  Cardiovascular: Normal rate and regular rhythm.    Pulmonary/Chest: Effort normal and breath sounds normal.   Abdomen: Soft, + BS, NT.    Assessment/Plan:  Dayton Daley is a(n) 54 y.o. male with MDD.    Medical clearance.  Patient is medically cleared for discharge.  All scripts were printed for the patient.     Hypertension.  Patient not currently taking medication.  Continue to monitor BP.  Alcohol use disorder.  Patient started on thiamine, folic acid, and multivitamin daily.   Laceration R shin with mild cellulitis.  3 sutures placed 8/3/24 per ED note.  Completed Kelflex 500 mg TID x 7 days on 8/17/24.  2 sutures removed 8/13 and 1 removed 8/14 without issues.  Apply bacitracin BID x7 days.  L ankle blister.  No signs of infection.  May apply Band-Aid daily.   Vitamin B12 deficiency.  Patient started vitamin B12 monthly injections.    The patient was discussed with Dr. Rubio and he is in agreement with the above note.  "

## 2024-08-19 NOTE — SOCIAL WORK
CM spoke to pt. CM and pt attempted to call mom to no avail.  Pt states he has no financial resources to secure transport. Pt states his parents will not answer bc they want to see him clean for 3 months before able to start trusting him again.      CM called [facility] [phone]. CM reviewed d/c plan. [facility] requested email with contact info sent to [email or web address]  As per [facility] he is not aware of financial resources to aide in transport.      CM scheduled roundtrip for today at 10:30am.     CM secured intake/psych eval at [facility] on 8/28 at 10am.. Cm spoke to [facility]. Intake schedule with [facility] and psych eval with Dr Carol DUPONT notified Ray of scheduled and reviewed d/c supports

## 2024-08-19 NOTE — BH TRANSITION RECORD
Contact Information: If you have any questions, concerns, pended studies, tests and/or procedures, or emergencies regarding your inpatient behavioral health visit. Please contact [facility] [phone] and ask to speak to a , nurse or physician. A contact is available 24 hours/ 7 days a week at this number.     Summary of Procedures Performed During your Stay:  Below is a list of major procedures performed during your hospital stay and a summary of results:  - No major procedures performed.    Pending Studies (From admission, onward)      None          Please follow up on the above pending studies with your PCP and/or referring provider.

## 2024-08-19 NOTE — PROGRESS NOTES
08/19/24   Team Meeting   Meeting Type Daily Rounds   Team Members Present   Team Members Present Physician;Nurse;Occupational Therapist;   Physician Team Member maynor garay    Nursing Team Member Kandy TRUONG   Social Work Team Member john baldwin   OT Team Member payal hayes   Patient/Family Present   Patient Present No   Patient's Family Present No   Pt d/c today to [facility] with f/y secured at [facility], denies all, expresses d/c readiness

## 2024-08-19 NOTE — NURSING NOTE
Pt was visible on the unit this evening, social w/ staff and peers. Pt denied all psych symptoms. Will CTM. Continuous pt safety checks ongoing.

## 2024-08-19 NOTE — SOCIAL WORK
CM spoke to pt regarding transport to [facility]. Pt stated he would make attempts to contact his family however stated that they were not on speaking terms. He stated while supportive they will need 3 months before being a financial to him.,  Pt agreed to explore other means of resources over the weekend.

## 2024-08-19 NOTE — NURSING NOTE
"Patient has been visible in the milieu this morning. He is calm and cooperative. Bright upon approach. He is compliant with his scheduled medications. His appetite is good. He reports mild anxiety. Denies depression. Denies Si/Hi and hallucinations. He reports feeling ready for his upcoming discharge this morning. He states \"I'm really going to get better this time\". Expresses positive outlook for the future. He has been up and ambulatory without difficulty. He is able to make his needs known. Plan of care continues. Q7 minute safety checks in progress.   "
